# Patient Record
Sex: FEMALE | Race: WHITE | NOT HISPANIC OR LATINO
[De-identification: names, ages, dates, MRNs, and addresses within clinical notes are randomized per-mention and may not be internally consistent; named-entity substitution may affect disease eponyms.]

---

## 2017-03-06 ENCOUNTER — APPOINTMENT (OUTPATIENT)
Dept: NEPHROLOGY | Facility: CLINIC | Age: 77
End: 2017-03-06

## 2017-03-06 VITALS — SYSTOLIC BLOOD PRESSURE: 132 MMHG | HEART RATE: 72 BPM | DIASTOLIC BLOOD PRESSURE: 78 MMHG

## 2017-03-07 RX ORDER — PROBENECID 500 MG/1
500 TABLET ORAL
Qty: 30 | Refills: 2 | Status: DISCONTINUED | COMMUNITY
Start: 2017-03-06 | End: 2017-03-07

## 2017-07-03 ENCOUNTER — APPOINTMENT (OUTPATIENT)
Dept: NEPHROLOGY | Facility: CLINIC | Age: 77
End: 2017-07-03

## 2017-07-03 VITALS
WEIGHT: 184 LBS | SYSTOLIC BLOOD PRESSURE: 136 MMHG | DIASTOLIC BLOOD PRESSURE: 78 MMHG | BODY MASS INDEX: 29.7 KG/M2 | HEART RATE: 72 BPM

## 2017-07-03 VITALS — WEIGHT: 184 LBS | HEIGHT: 64 IN | BODY MASS INDEX: 31.41 KG/M2

## 2017-12-19 ENCOUNTER — EMERGENCY (EMERGENCY)
Facility: HOSPITAL | Age: 77
LOS: 1 days | Discharge: HOME | End: 2017-12-19
Attending: EMERGENCY MEDICINE | Admitting: EMERGENCY MEDICINE
Payer: MEDICARE

## 2017-12-19 VITALS
SYSTOLIC BLOOD PRESSURE: 201 MMHG | WEIGHT: 182.76 LBS | HEIGHT: 66 IN | TEMPERATURE: 98 F | RESPIRATION RATE: 16 BRPM | OXYGEN SATURATION: 98 % | HEART RATE: 71 BPM | DIASTOLIC BLOOD PRESSURE: 88 MMHG

## 2017-12-19 VITALS
HEART RATE: 66 BPM | DIASTOLIC BLOOD PRESSURE: 71 MMHG | RESPIRATION RATE: 16 BRPM | OXYGEN SATURATION: 95 % | SYSTOLIC BLOOD PRESSURE: 178 MMHG | TEMPERATURE: 99 F

## 2017-12-19 DIAGNOSIS — Z90.710 ACQUIRED ABSENCE OF BOTH CERVIX AND UTERUS: Chronic | ICD-10-CM

## 2017-12-19 LAB
ANION GAP SERPL CALC-SCNC: 17 MMOL/L — SIGNIFICANT CHANGE UP (ref 5–17)
APTT BLD: 30.6 SEC — SIGNIFICANT CHANGE UP (ref 27.5–37.4)
BASOPHILS NFR BLD AUTO: 0.4 % — SIGNIFICANT CHANGE UP (ref 0–2)
BUN SERPL-MCNC: 68 MG/DL — HIGH (ref 7–23)
CALCIUM SERPL-MCNC: 10.2 MG/DL — SIGNIFICANT CHANGE UP (ref 8.4–10.5)
CHLORIDE SERPL-SCNC: 103 MMOL/L — SIGNIFICANT CHANGE UP (ref 96–108)
CO2 SERPL-SCNC: 22 MMOL/L — SIGNIFICANT CHANGE UP (ref 22–31)
CREAT SERPL-MCNC: 2.81 MG/DL — HIGH (ref 0.5–1.3)
EOSINOPHIL NFR BLD AUTO: 1.3 % — SIGNIFICANT CHANGE UP (ref 0–6)
GLUCOSE SERPL-MCNC: 100 MG/DL — HIGH (ref 70–99)
HCT VFR BLD CALC: 36.6 % — SIGNIFICANT CHANGE UP (ref 34.5–45)
HGB BLD-MCNC: 12.1 G/DL — SIGNIFICANT CHANGE UP (ref 11.5–15.5)
INR BLD: 0.93 — SIGNIFICANT CHANGE UP (ref 0.88–1.16)
LYMPHOCYTES # BLD AUTO: 11.3 % — LOW (ref 13–44)
MCHC RBC-ENTMCNC: 30.3 PG — SIGNIFICANT CHANGE UP (ref 27–34)
MCHC RBC-ENTMCNC: 33.1 G/DL — SIGNIFICANT CHANGE UP (ref 32–36)
MCV RBC AUTO: 91.7 FL — SIGNIFICANT CHANGE UP (ref 80–100)
MONOCYTES NFR BLD AUTO: 9.4 % — SIGNIFICANT CHANGE UP (ref 2–14)
NEUTROPHILS NFR BLD AUTO: 77.6 % — HIGH (ref 43–77)
PLATELET # BLD AUTO: 255 K/UL — SIGNIFICANT CHANGE UP (ref 150–400)
POTASSIUM SERPL-MCNC: 4.4 MMOL/L — SIGNIFICANT CHANGE UP (ref 3.5–5.3)
POTASSIUM SERPL-SCNC: 4.4 MMOL/L — SIGNIFICANT CHANGE UP (ref 3.5–5.3)
PROTHROM AB SERPL-ACNC: 10.3 SEC — SIGNIFICANT CHANGE UP (ref 9.8–12.7)
RBC # BLD: 3.99 M/UL — SIGNIFICANT CHANGE UP (ref 3.8–5.2)
RBC # FLD: 14.8 % — SIGNIFICANT CHANGE UP (ref 10.3–16.9)
SODIUM SERPL-SCNC: 142 MMOL/L — SIGNIFICANT CHANGE UP (ref 135–145)
WBC # BLD: 7.7 K/UL — SIGNIFICANT CHANGE UP (ref 3.8–10.5)
WBC # FLD AUTO: 7.7 K/UL — SIGNIFICANT CHANGE UP (ref 3.8–10.5)

## 2017-12-19 PROCEDURE — 93005 ELECTROCARDIOGRAM TRACING: CPT

## 2017-12-19 PROCEDURE — 99284 EMERGENCY DEPT VISIT MOD MDM: CPT | Mod: 25

## 2017-12-19 PROCEDURE — 82962 GLUCOSE BLOOD TEST: CPT

## 2017-12-19 PROCEDURE — 85025 COMPLETE CBC W/AUTO DIFF WBC: CPT

## 2017-12-19 PROCEDURE — 36415 COLL VENOUS BLD VENIPUNCTURE: CPT

## 2017-12-19 PROCEDURE — 80048 BASIC METABOLIC PNL TOTAL CA: CPT

## 2017-12-19 PROCEDURE — 93010 ELECTROCARDIOGRAM REPORT: CPT

## 2017-12-19 PROCEDURE — 85610 PROTHROMBIN TIME: CPT

## 2017-12-19 PROCEDURE — 70450 CT HEAD/BRAIN W/O DYE: CPT | Mod: 26

## 2017-12-19 PROCEDURE — 85730 THROMBOPLASTIN TIME PARTIAL: CPT

## 2017-12-19 PROCEDURE — 70450 CT HEAD/BRAIN W/O DYE: CPT

## 2017-12-19 NOTE — ED ADULT NURSE NOTE - PMH
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

## 2017-12-19 NOTE — ED ADULT NURSE NOTE - OBJECTIVE STATEMENT
presents to ED c/o of right arm weakness since last night. pt reports not having the strength to move hand or pick things up right hand.  pt said she thought she was just tired.  Says she still feels like her right hand is weak today.  pt denies any headache, visual changes, nausea, numbness or tingling, difficulty speaking,

## 2017-12-19 NOTE — ED ADULT TRIAGE NOTE - CHIEF COMPLAINT QUOTE
"Last night around 10PM I noticed my right hand is like a drop hand. Even when I was brushing my teeth I couldn't hold it."

## 2017-12-19 NOTE — ED PROVIDER NOTE - PROGRESS NOTE DETAILS
CT neg.  Pt discussed w pmd who requests Dr Mata group for consult.  Neuro paged to discuss. Pt discussed w Dr Mata who requests we dc the pt to come see him now at his office; he states he can arrange mri prn but agrees it's likely a peripheral nerve palsy.

## 2017-12-19 NOTE — ED PROVIDER NOTE - MEDICAL DECISION MAKING DETAILS
Pt c/o R hand clumsiness w decreased motor and sens on exam.  BP elevated but improving w/o intervention by ed.  ? cva vs peripheral nerve palsy (pt may have compressed it last pm on her chair).  Plan labs, ct head, ekg.  Reassess

## 2017-12-19 NOTE — ED PROVIDER NOTE - OBJECTIVE STATEMENT
76 yo female h/o mesothelioma, htn, gout, hypothyroid c/o R hand clumsiness since 10 pm last night.  Pt did not sleep Sun into Mon; she went to the gym during the day yest and did some weight activities.  She might have fallen asleep on her chair before getting ready for bed and noting R hand clumsiness but is not sure.  Pt woke this am and noted same sx.  No ha, facial or le sx, h/o cva, neck/back pain, cp, palpitations, h/o similar.  No change in vision, speech, gait, dizziness.  Pt reports she took her bp meds ~ 30 min pta.  She states she felt nervous in triage, which affects her bp.

## 2017-12-19 NOTE — ED PROVIDER NOTE - NEUROLOGICAL, MLM
Awake, alert, oriented x 3, CN II-XII grossly intact, motor 4/5 r hand/wrist but 5/5 elbow/shoulder, 5/5 all other ext, decreased sensation 1st mcp web space area R hand o/w no gross sens deficits, gait steady, no ataxia, speech clear.

## 2017-12-22 DIAGNOSIS — G58.8 OTHER SPECIFIED MONONEUROPATHIES: ICD-10-CM

## 2017-12-22 DIAGNOSIS — Z88.0 ALLERGY STATUS TO PENICILLIN: ICD-10-CM

## 2017-12-22 DIAGNOSIS — R53.1 WEAKNESS: ICD-10-CM

## 2017-12-22 DIAGNOSIS — I10 ESSENTIAL (PRIMARY) HYPERTENSION: ICD-10-CM

## 2017-12-22 DIAGNOSIS — Z90.710 ACQUIRED ABSENCE OF BOTH CERVIX AND UTERUS: ICD-10-CM

## 2017-12-22 DIAGNOSIS — Z79.899 OTHER LONG TERM (CURRENT) DRUG THERAPY: ICD-10-CM

## 2017-12-22 DIAGNOSIS — Z88.8 ALLERGY STATUS TO OTHER DRUGS, MEDICAMENTS AND BIOLOGICAL SUBSTANCES: ICD-10-CM

## 2017-12-22 DIAGNOSIS — Z79.82 LONG TERM (CURRENT) USE OF ASPIRIN: ICD-10-CM

## 2017-12-22 DIAGNOSIS — E03.9 HYPOTHYROIDISM, UNSPECIFIED: ICD-10-CM

## 2017-12-22 DIAGNOSIS — Z91.018 ALLERGY TO OTHER FOODS: ICD-10-CM

## 2017-12-22 DIAGNOSIS — Z88.1 ALLERGY STATUS TO OTHER ANTIBIOTIC AGENTS STATUS: ICD-10-CM

## 2017-12-22 DIAGNOSIS — Z88.2 ALLERGY STATUS TO SULFONAMIDES: ICD-10-CM

## 2018-02-26 ENCOUNTER — APPOINTMENT (OUTPATIENT)
Dept: NEPHROLOGY | Facility: CLINIC | Age: 78
End: 2018-02-26
Payer: MEDICARE

## 2018-02-26 VITALS — WEIGHT: 170 LBS | BODY MASS INDEX: 29.18 KG/M2

## 2018-02-26 VITALS — DIASTOLIC BLOOD PRESSURE: 82 MMHG | SYSTOLIC BLOOD PRESSURE: 152 MMHG | HEART RATE: 78 BPM

## 2018-02-26 PROCEDURE — 99214 OFFICE O/P EST MOD 30 MIN: CPT

## 2018-02-26 RX ORDER — OLMESARTAN MEDOXOMIL 40 MG/1
40 TABLET, FILM COATED ORAL
Qty: 90 | Refills: 0 | Status: DISCONTINUED | COMMUNITY
Start: 2017-04-05 | End: 2018-02-26

## 2018-03-27 ENCOUNTER — APPOINTMENT (OUTPATIENT)
Dept: OBGYN | Facility: CLINIC | Age: 78
End: 2018-03-27
Payer: MEDICARE

## 2018-03-27 VITALS
BODY MASS INDEX: 29.37 KG/M2 | DIASTOLIC BLOOD PRESSURE: 80 MMHG | SYSTOLIC BLOOD PRESSURE: 140 MMHG | WEIGHT: 172 LBS | HEIGHT: 64 IN

## 2018-03-27 DIAGNOSIS — Z02.82 ENCOUNTER FOR ADOPTION SERVICES: ICD-10-CM

## 2018-03-27 PROCEDURE — G0101: CPT

## 2018-04-02 LAB — PAP TEST: NORMAL

## 2018-04-05 ENCOUNTER — APPOINTMENT (OUTPATIENT)
Dept: NEPHROLOGY | Facility: CLINIC | Age: 78
End: 2018-04-05
Payer: MEDICARE

## 2018-04-05 VITALS — HEART RATE: 72 BPM | DIASTOLIC BLOOD PRESSURE: 70 MMHG | SYSTOLIC BLOOD PRESSURE: 128 MMHG

## 2018-04-05 PROCEDURE — 99214 OFFICE O/P EST MOD 30 MIN: CPT

## 2018-07-29 ENCOUNTER — EMERGENCY (EMERGENCY)
Facility: HOSPITAL | Age: 78
LOS: 1 days | Discharge: HOME | End: 2018-07-29
Attending: EMERGENCY MEDICINE | Admitting: EMERGENCY MEDICINE
Payer: MEDICARE

## 2018-07-29 VITALS
TEMPERATURE: 99 F | OXYGEN SATURATION: 95 % | HEART RATE: 69 BPM | SYSTOLIC BLOOD PRESSURE: 160 MMHG | RESPIRATION RATE: 18 BRPM | DIASTOLIC BLOOD PRESSURE: 73 MMHG

## 2018-07-29 VITALS
RESPIRATION RATE: 20 BRPM | HEART RATE: 79 BPM | SYSTOLIC BLOOD PRESSURE: 156 MMHG | DIASTOLIC BLOOD PRESSURE: 87 MMHG | WEIGHT: 169.54 LBS | OXYGEN SATURATION: 98 % | TEMPERATURE: 98 F

## 2018-07-29 DIAGNOSIS — R11.2 NAUSEA WITH VOMITING, UNSPECIFIED: ICD-10-CM

## 2018-07-29 DIAGNOSIS — Z79.899 OTHER LONG TERM (CURRENT) DRUG THERAPY: ICD-10-CM

## 2018-07-29 DIAGNOSIS — Z88.8 ALLERGY STATUS TO OTHER DRUGS, MEDICAMENTS AND BIOLOGICAL SUBSTANCES: ICD-10-CM

## 2018-07-29 DIAGNOSIS — Z79.82 LONG TERM (CURRENT) USE OF ASPIRIN: ICD-10-CM

## 2018-07-29 DIAGNOSIS — Z91.018 ALLERGY TO OTHER FOODS: ICD-10-CM

## 2018-07-29 DIAGNOSIS — I10 ESSENTIAL (PRIMARY) HYPERTENSION: ICD-10-CM

## 2018-07-29 DIAGNOSIS — R10.9 UNSPECIFIED ABDOMINAL PAIN: ICD-10-CM

## 2018-07-29 DIAGNOSIS — Z88.1 ALLERGY STATUS TO OTHER ANTIBIOTIC AGENTS STATUS: ICD-10-CM

## 2018-07-29 DIAGNOSIS — Z88.0 ALLERGY STATUS TO PENICILLIN: ICD-10-CM

## 2018-07-29 DIAGNOSIS — Z90.710 ACQUIRED ABSENCE OF BOTH CERVIX AND UTERUS: Chronic | ICD-10-CM

## 2018-07-29 LAB
ALBUMIN SERPL ELPH-MCNC: 4.4 G/DL — SIGNIFICANT CHANGE UP (ref 3.3–5)
ALP SERPL-CCNC: 54 U/L — SIGNIFICANT CHANGE UP (ref 40–120)
ALT FLD-CCNC: 16 U/L — SIGNIFICANT CHANGE UP (ref 10–45)
ANION GAP SERPL CALC-SCNC: 17 MMOL/L — SIGNIFICANT CHANGE UP (ref 5–17)
APPEARANCE UR: CLEAR — SIGNIFICANT CHANGE UP
AST SERPL-CCNC: 23 U/L — SIGNIFICANT CHANGE UP (ref 10–40)
BASOPHILS NFR BLD AUTO: 0.1 % — SIGNIFICANT CHANGE UP (ref 0–2)
BILIRUB SERPL-MCNC: 0.5 MG/DL — SIGNIFICANT CHANGE UP (ref 0.2–1.2)
BILIRUB UR-MCNC: NEGATIVE — SIGNIFICANT CHANGE UP
BUN SERPL-MCNC: 42 MG/DL — HIGH (ref 7–23)
CALCIUM SERPL-MCNC: 10.7 MG/DL — HIGH (ref 8.4–10.5)
CHLORIDE SERPL-SCNC: 97 MMOL/L — SIGNIFICANT CHANGE UP (ref 96–108)
CO2 SERPL-SCNC: 26 MMOL/L — SIGNIFICANT CHANGE UP (ref 22–31)
COLOR SPEC: YELLOW — SIGNIFICANT CHANGE UP
CREAT SERPL-MCNC: 1.84 MG/DL — HIGH (ref 0.5–1.3)
DIFF PNL FLD: NEGATIVE — SIGNIFICANT CHANGE UP
EOSINOPHIL NFR BLD AUTO: 0.1 % — SIGNIFICANT CHANGE UP (ref 0–6)
GLUCOSE SERPL-MCNC: 136 MG/DL — HIGH (ref 70–99)
GLUCOSE UR QL: NEGATIVE — SIGNIFICANT CHANGE UP
HCT VFR BLD CALC: 40.9 % — SIGNIFICANT CHANGE UP (ref 34.5–45)
HGB BLD-MCNC: 12.9 G/DL — SIGNIFICANT CHANGE UP (ref 11.5–15.5)
KETONES UR-MCNC: NEGATIVE — SIGNIFICANT CHANGE UP
LEUKOCYTE ESTERASE UR-ACNC: ABNORMAL
LIDOCAIN IGE QN: 38 U/L — SIGNIFICANT CHANGE UP (ref 7–60)
LYMPHOCYTES # BLD AUTO: 10 % — LOW (ref 13–44)
MCHC RBC-ENTMCNC: 29.5 PG — SIGNIFICANT CHANGE UP (ref 27–34)
MCHC RBC-ENTMCNC: 31.5 G/DL — LOW (ref 32–36)
MCV RBC AUTO: 93.4 FL — SIGNIFICANT CHANGE UP (ref 80–100)
MONOCYTES NFR BLD AUTO: 12.8 % — SIGNIFICANT CHANGE UP (ref 2–14)
NEUTROPHILS NFR BLD AUTO: 77 % — SIGNIFICANT CHANGE UP (ref 43–77)
NITRITE UR-MCNC: NEGATIVE — SIGNIFICANT CHANGE UP
PH UR: 6 — SIGNIFICANT CHANGE UP (ref 5–8)
PLATELET # BLD AUTO: 305 K/UL — SIGNIFICANT CHANGE UP (ref 150–400)
POTASSIUM SERPL-MCNC: 4 MMOL/L — SIGNIFICANT CHANGE UP (ref 3.5–5.3)
POTASSIUM SERPL-SCNC: 4 MMOL/L — SIGNIFICANT CHANGE UP (ref 3.5–5.3)
PROT SERPL-MCNC: 7.4 G/DL — SIGNIFICANT CHANGE UP (ref 6–8.3)
PROT UR-MCNC: 100 MG/DL
RBC # BLD: 4.38 M/UL — SIGNIFICANT CHANGE UP (ref 3.8–5.2)
RBC # FLD: 15.6 % — SIGNIFICANT CHANGE UP (ref 10.3–16.9)
SODIUM SERPL-SCNC: 140 MMOL/L — SIGNIFICANT CHANGE UP (ref 135–145)
SP GR SPEC: 1.02 — SIGNIFICANT CHANGE UP (ref 1–1.03)
UROBILINOGEN FLD QL: 0.2 E.U./DL — SIGNIFICANT CHANGE UP
WBC # BLD: 10.5 K/UL — SIGNIFICANT CHANGE UP (ref 3.8–10.5)
WBC # FLD AUTO: 10.5 K/UL — SIGNIFICANT CHANGE UP (ref 3.8–10.5)

## 2018-07-29 PROCEDURE — 99284 EMERGENCY DEPT VISIT MOD MDM: CPT | Mod: 25

## 2018-07-29 PROCEDURE — 36415 COLL VENOUS BLD VENIPUNCTURE: CPT

## 2018-07-29 PROCEDURE — 83690 ASSAY OF LIPASE: CPT

## 2018-07-29 PROCEDURE — 96374 THER/PROPH/DIAG INJ IV PUSH: CPT

## 2018-07-29 PROCEDURE — 80053 COMPREHEN METABOLIC PANEL: CPT

## 2018-07-29 PROCEDURE — 81001 URINALYSIS AUTO W/SCOPE: CPT

## 2018-07-29 PROCEDURE — 99284 EMERGENCY DEPT VISIT MOD MDM: CPT

## 2018-07-29 PROCEDURE — 96361 HYDRATE IV INFUSION ADD-ON: CPT

## 2018-07-29 PROCEDURE — 85025 COMPLETE CBC W/AUTO DIFF WBC: CPT

## 2018-07-29 RX ORDER — SODIUM CHLORIDE 9 MG/ML
3 INJECTION INTRAMUSCULAR; INTRAVENOUS; SUBCUTANEOUS ONCE
Qty: 0 | Refills: 0 | Status: COMPLETED | OUTPATIENT
Start: 2018-07-29 | End: 2018-07-29

## 2018-07-29 RX ORDER — SODIUM CHLORIDE 9 MG/ML
1000 INJECTION INTRAMUSCULAR; INTRAVENOUS; SUBCUTANEOUS ONCE
Qty: 0 | Refills: 0 | Status: COMPLETED | OUTPATIENT
Start: 2018-07-29 | End: 2018-07-29

## 2018-07-29 RX ORDER — SODIUM CHLORIDE 9 MG/ML
1000 INJECTION INTRAMUSCULAR; INTRAVENOUS; SUBCUTANEOUS
Qty: 0 | Refills: 0 | Status: DISCONTINUED | OUTPATIENT
Start: 2018-07-29 | End: 2018-08-02

## 2018-07-29 RX ORDER — FAMOTIDINE 10 MG/ML
20 INJECTION INTRAVENOUS ONCE
Qty: 0 | Refills: 0 | Status: COMPLETED | OUTPATIENT
Start: 2018-07-29 | End: 2018-07-29

## 2018-07-29 RX ADMIN — SODIUM CHLORIDE 1000 MILLILITER(S): 9 INJECTION INTRAMUSCULAR; INTRAVENOUS; SUBCUTANEOUS at 10:52

## 2018-07-29 RX ADMIN — SODIUM CHLORIDE 3 MILLILITER(S): 9 INJECTION INTRAMUSCULAR; INTRAVENOUS; SUBCUTANEOUS at 10:56

## 2018-07-29 RX ADMIN — FAMOTIDINE 20 MILLIGRAM(S): 10 INJECTION INTRAVENOUS at 10:52

## 2018-07-29 RX ADMIN — SODIUM CHLORIDE 1000 MILLILITER(S): 9 INJECTION INTRAMUSCULAR; INTRAVENOUS; SUBCUTANEOUS at 11:52

## 2018-07-29 RX ADMIN — Medication 30 MILLILITER(S): at 12:58

## 2018-07-29 NOTE — ED ADULT NURSE NOTE - OBJECTIVE STATEMENT
Patient is a 76yo female reporting multiple episodes of non-bloody emesis since last night. Patient reports she has "flare ups" from scar tissue in colon due to hx of Mesothelioma. In the past, patient reports relief from IV fluid hydration. Denies abdominal pain, diarrhea, chest pain, shortness of breath.

## 2018-07-29 NOTE — ED PROVIDER NOTE - OBJECTIVE STATEMENT
The pt is a 76 y/o F, who presents to ED stating "I just need iv fluids" - claims "resolving blockage". PMH mesothelioma, htn, gout. Ate something that didn't sit well with her 2 d ago, then developed n/v and some abd cramping, the abd cramping has since resolved, nausea fully resolved, but states "I got very dehydrated", claims hx of SBOs that self resolve, is now able to tolerate ice chips, passing gas, last bm this am. Pt refusing kub and ct, stating "I know what I need, which is ivf". Denies fevers, chills, cp, sob, diarrhea, constipation, fevers, chills, dizziness

## 2018-07-29 NOTE — ED PROVIDER NOTE - MEDICAL DECISION MAKING DETAILS
pt w/n/v and abd cramping - now resolved, pt states that only needs ivf - states that gets these episodes frequently when she eats the "wrong" thing - states sbo self resolve, refusing all imaging, labs at pt's baseline, given ivf and pt tolerating po fluids, hemodynamically stable, pt requesting dc

## 2018-07-29 NOTE — ED ADULT NURSE REASSESSMENT NOTE - NS ED NURSE REASSESS COMMENT FT1
Received rpt for continuing care in bed 19.  Patient A+OX3, IVF infusing as ordered.  No distress noted.  Breathing easily and unlabored.

## 2018-07-29 NOTE — ED PROVIDER NOTE - ATTENDING CONTRIBUTION TO CARE
I have seen the pt and reviewed all pertinent clinical data. I agree with the documentation/care/plan executed by SAVITA Cuenca.

## 2018-07-29 NOTE — ED ADULT TRIAGE NOTE - CHIEF COMPLAINT QUOTE
pt c/o vomiting since yesterday, pt denies any blood in the emesis. reports this happened to her before, she " gets hydrated with IV fluids".

## 2018-09-10 ENCOUNTER — APPOINTMENT (OUTPATIENT)
Dept: NEPHROLOGY | Facility: CLINIC | Age: 78
End: 2018-09-10
Payer: MEDICARE

## 2018-09-10 VITALS — DIASTOLIC BLOOD PRESSURE: 72 MMHG | SYSTOLIC BLOOD PRESSURE: 136 MMHG | HEART RATE: 74 BPM

## 2018-09-10 PROCEDURE — 99214 OFFICE O/P EST MOD 30 MIN: CPT

## 2019-01-28 ENCOUNTER — APPOINTMENT (OUTPATIENT)
Dept: NEPHROLOGY | Facility: CLINIC | Age: 79
End: 2019-01-28
Payer: MEDICARE

## 2019-01-28 VITALS — DIASTOLIC BLOOD PRESSURE: 70 MMHG | SYSTOLIC BLOOD PRESSURE: 138 MMHG | HEART RATE: 72 BPM

## 2019-01-28 PROCEDURE — 99214 OFFICE O/P EST MOD 30 MIN: CPT

## 2019-01-28 RX ORDER — ASPIRIN ENTERIC COATED TABLETS 81 MG 81 MG/1
81 TABLET, DELAYED RELEASE ORAL
Refills: 0 | Status: DISCONTINUED | COMMUNITY
End: 2019-01-28

## 2019-01-31 NOTE — PHYSICAL EXAM
[General Appearance - Alert] : alert [General Appearance - In No Acute Distress] : in no acute distress [Sclera] : the sclera and conjunctiva were normal [Extraocular Movements] : extraocular movements were intact [Outer Ear] : the ears and nose were normal in appearance [Examination Of The Oral Cavity] : the lips and gums were normal [Neck Appearance] : the appearance of the neck was normal [Neck Cervical Mass (___cm)] : no neck mass was observed [Jugular Venous Distention Increased] : there was no jugular-venous distention [Auscultation Breath Sounds / Voice Sounds] : lungs were clear to auscultation bilaterally [Heart Rate And Rhythm] : heart rate was normal and rhythm regular [Heart Sounds] : normal S1 and S2 [Heart Sounds Gallop] : no gallops [Murmurs] : no murmurs [Heart Sounds Pericardial Friction Rub] : no pericardial rub [Abdomen Soft] : soft [Abdomen Tenderness] : non-tender [Cervical Lymph Nodes Enlarged Anterior Bilaterally] : anterior cervical [Supraclavicular Lymph Nodes Enlarged Bilaterally] : supraclavicular [No CVA Tenderness] : no ~M costovertebral angle tenderness [No Spinal Tenderness] : no spinal tenderness [] : no rash [No Focal Deficits] : no focal deficits [Oriented To Time, Place, And Person] : oriented to person, place, and time [Impaired Insight] : insight and judgment were intact [Affect] : the affect was normal [FreeTextEntry1] : tr left ankle edema

## 2019-01-31 NOTE — HISTORY OF PRESENT ILLNESS
[FreeTextEntry1] : 77 yo woman  with CKD 4, HTN, proteinuria and gout here for f/u evaluation\par reports that she had gastroenteritis last week- 1/21- for several days- vomiting multiple times daily and frequent loose BM's\par labs done for this visit were done 1/23- acute rise in creat to 1.97, uric acid 11.4, calcium 10.7\par now feeling better than last week- but not all the way back to baseline-\par Prior that, needed prednisone 2 weeks ago for acute gout-- was on for about 1 week\par had taken a few aleve\par was off cherry extract and tumeric and celery seed - now back on it- \par Denies SOB or CP; No dysuria, flank pain, hematuria or dysuria.\par \par PMH: (Sept OV- since last  OV was seen in ER (7/29/2018) with abd pain, ? partial small bowel obstruction- vomited x 1 when happened--  then resolved)

## 2019-01-31 NOTE — REASON FOR VISIT
[Follow-Up] : a follow-up visit [FreeTextEntry1] : for CKD 4, HTN, proteinuria, microscopic hematuria and gout

## 2019-01-31 NOTE — ASSESSMENT
[FreeTextEntry1] : all lab data was reviewed with patient in detail from 1/23/2019\par  78-year-old woman with CKD 4, HTN, proteinuria, hematuria and gout. \par  -CKD 4-: B/C 48/1.91 BP and volume status acceptable \par -Gout: uric acid levels 11.7- this following her acute illness as above- to repeat\par  c/w  low purine diet- cherry juice and tumeric- is using  prednisone at times by her own accord\par rediscussed that she might benefit from allergy consult- but feels that she has already explored this venue\par -hypercalcemia-  new- to repeat data, obtain SPEP as well- avoid Ca products\par -HTN: controlled- no change in meds\par -proteinuria -low grade -205 mg/G creat--  to monitor off RAAS blockade\par -pyuria- repeat with urine culture \par -LE edema: much better-\par - secondary hyperparathyroidism: elevated  PTH in setting of hypercalcemia- reflects pattern most c/w primary or tertiary hyperpara- to repeat data\par --hematuria- resolved-\par \par f/u 4-6 months

## 2019-09-29 ENCOUNTER — EMERGENCY (EMERGENCY)
Facility: HOSPITAL | Age: 79
LOS: 1 days | Discharge: HOME | End: 2019-09-29
Attending: EMERGENCY MEDICINE | Admitting: EMERGENCY MEDICINE
Payer: MEDICARE

## 2019-09-29 VITALS
SYSTOLIC BLOOD PRESSURE: 193 MMHG | OXYGEN SATURATION: 99 % | TEMPERATURE: 98 F | HEART RATE: 68 BPM | RESPIRATION RATE: 18 BRPM | DIASTOLIC BLOOD PRESSURE: 87 MMHG | WEIGHT: 153.66 LBS

## 2019-09-29 DIAGNOSIS — M79.89 OTHER SPECIFIED SOFT TISSUE DISORDERS: ICD-10-CM

## 2019-09-29 DIAGNOSIS — M79.645 PAIN IN LEFT FINGER(S): ICD-10-CM

## 2019-09-29 DIAGNOSIS — Z90.710 ACQUIRED ABSENCE OF BOTH CERVIX AND UTERUS: Chronic | ICD-10-CM

## 2019-09-29 PROCEDURE — 99283 EMERGENCY DEPT VISIT LOW MDM: CPT

## 2019-09-29 RX ORDER — OLMESARTAN MEDOXOMIL 5 MG/1
1 TABLET, FILM COATED ORAL
Qty: 0 | Refills: 0 | DISCHARGE

## 2019-09-29 RX ORDER — OLMESARTAN MEDOXOMIL-HYDROCHLOROTHIAZIDE 25; 40 MG/1; MG/1
0 TABLET, FILM COATED ORAL
Qty: 0 | Refills: 0 | DISCHARGE

## 2019-09-29 RX ADMIN — Medication 100 MILLIGRAM(S): at 11:08

## 2019-09-29 NOTE — ED PROVIDER NOTE - PATIENT PORTAL LINK FT
You can access the FollowMyHealth Patient Portal offered by [facility] by registering at the following website: [email or web address]. By joining [employer]’s FollowMyHealth portal, you will also be able to view your health information using other applications (apps) compatible with our system.

## 2019-09-29 NOTE — ED PROVIDER NOTE - CLINICAL SUMMARY MEDICAL DECISION MAKING FREE TEXT BOX
here most likely w/ gout flare but given redness, will cover for cellulitis as well since pt to be placed on steroids. pt to return to the ED in 2 days for wound check vs see her doctor. will rx prednisone for her to take as this usually helps. doubt septic joint at this time. DC home in NAD with strict return precautions given.

## 2019-09-29 NOTE — ED PROVIDER NOTE - OBJECTIVE STATEMENT
77yo F hx of gout, htn, here with complaint of recurrent gout flare, worsening. States that she has swelling to right index finger DIP and PIP and spreading upwards to dorsal area, started a few days ago , took  prednisone 10mg during the week which usually helps, but pain is getting worse. Redness to DIP/PIP joints but denies redness elsewhere. Feels better when she elevates hand. Denies fever/chills/rigors, swelling to other joints. States that usually 10 or 20 mg of prednisone clears her gout but higher doses she cannot tolerate 2/2 psychiatric side effects. usually sees dr rubio for this. had left over prednisone that she took, but was afraid to self treat.

## 2019-09-29 NOTE — ED PROVIDER NOTE - PHYSICAL EXAMINATION
CONSTITUTIONAL: Well-appearing; well-nourished; in no apparent distress.   HEAD: Normocephalic; atraumatic.   EYES:  conjunctiva and sclera clear  ENT: normal nose; no rhinorrhea;  NECK: Supple; full ROM  RESPIRATORY: Breathing easily; no resp difficulty  EXT: No cyanosis, R 2nd finger w/ redness and swelling to DIP and PIP w/ tophi palpable. erythema does not extend past the joints, +edema to 2nd MCP joint. tender to touch. no induration.   SKIN: Normal for age and race; warm; dry; good turgor; no apparent lesions or rash.   NEURO: A & O x 3; face symmetric; grossly unremarkable.   PSYCHOLOGICAL: The patient’s mood and manner are appropriate.

## 2019-09-29 NOTE — ED ADULT NURSE NOTE - OBJECTIVE STATEMENT
pt has swelling to right index finger and spreading upwards to dorsal area, started a few days ago , took some prednisone during the week , getting worse , no fever/chills

## 2019-10-15 ENCOUNTER — APPOINTMENT (OUTPATIENT)
Dept: NEPHROLOGY | Facility: CLINIC | Age: 79
End: 2019-10-15
Payer: MEDICARE

## 2019-10-15 VITALS — SYSTOLIC BLOOD PRESSURE: 146 MMHG | DIASTOLIC BLOOD PRESSURE: 70 MMHG | HEART RATE: 70 BPM

## 2019-10-15 PROCEDURE — 99214 OFFICE O/P EST MOD 30 MIN: CPT

## 2019-10-15 NOTE — ASSESSMENT
[FreeTextEntry1] : all lab data was reviewed with patient in detail from 8/20/2019\par  78-year-old woman with CKD 4, HTN, proteinuria, hematuria and gout. \par  -CKD 4-: B/C 44/1.53- better- BP and volume status acceptable \par -Gout: uric acid levels  down to 8.6 from 11.7- \par  c/w  low purine diet- cherry juice and tumeric-\par consider IV Krystexxa (pegloticase)- will investigate\par  is using  prednisone at times by her own accord\par still she might benefit from allergy consult- re assess allopurinol- but she feels that she has already explored this \par -hypercalcemia-  Ca 10.6-  instructed to dc Vit D\par -HTN: BP above goal- but just off prednisone- instructed to monitor at home; adjust medications if remains > 140\par -proteinuria -low grade -205 mg/G creat--  for repeat UPC ratio today\par  to monitor off RAAS blockade\par -pyuria- intermittent\par -LE edema: resolved\par - secondary hyperparathyroidism: trial of dc Vit D; may benefit from sensipar if PTH  rises\par --hematuria- resolved-\par \par f/u 4-6 months

## 2019-10-15 NOTE — PHYSICAL EXAM
[General Appearance - Alert] : alert [General Appearance - In No Acute Distress] : in no acute distress [Sclera] : the sclera and conjunctiva were normal [Extraocular Movements] : extraocular movements were intact [Outer Ear] : the ears and nose were normal in appearance [Examination Of The Oral Cavity] : the lips and gums were normal [Neck Appearance] : the appearance of the neck was normal [Jugular Venous Distention Increased] : there was no jugular-venous distention [Neck Cervical Mass (___cm)] : no neck mass was observed [Heart Sounds Gallop] : no gallops [Heart Rate And Rhythm] : heart rate was normal and rhythm regular [Heart Sounds] : normal S1 and S2 [Heart Sounds Pericardial Friction Rub] : no pericardial rub [Murmurs] : no murmurs [Abdomen Soft] : soft [Abdomen Tenderness] : non-tender [Cervical Lymph Nodes Enlarged Anterior Bilaterally] : anterior cervical [Supraclavicular Lymph Nodes Enlarged Bilaterally] : supraclavicular [No CVA Tenderness] : no ~M costovertebral angle tenderness [No Spinal Tenderness] : no spinal tenderness [] : no rash [Oriented To Time, Place, And Person] : oriented to person, place, and time [No Focal Deficits] : no focal deficits [Affect] : the affect was normal [Impaired Insight] : insight and judgment were intact [Edema] : there was no peripheral edema [FreeTextEntry1] : soft creps right base

## 2019-10-15 NOTE — HISTORY OF PRESENT ILLNESS
[FreeTextEntry1] : 79 yo woman  here for f/u evaluation of CKD 4, HTN, proteinuria and gout.\par acute gout attack about 2 weeks ago- right index finger- still erythematous and tender\par took prednisone for about 1 week\par back on her supplements:  cherry extract and tumeric and celery seed -\par did recently have PNA as well- now resolved- for f/u CXR soon\par Does use aleve as needed\par Had EGD and colonoscopy recently- all okay\par Denies SOB or CP; No dysuria, flank pain, hematuria or dysuria.\par \par PMH: (Sept OV- since last  OV was seen in ER (7/29/2018) with abd pain, ? partial small bowel obstruction- vomited x 1 when happened--  then resolved)

## 2019-10-17 ENCOUNTER — RESULT REVIEW (OUTPATIENT)
Age: 79
End: 2019-10-17

## 2019-10-17 LAB
APPEARANCE: CLEAR
BACTERIA: NEGATIVE
BILIRUBIN URINE: NEGATIVE
BLOOD URINE: NEGATIVE
COLOR: NORMAL
CREAT SPEC-SCNC: 69 MG/DL
CREAT/PROT UR: 0.3 RATIO
GLUCOSE QUALITATIVE U: NEGATIVE
HYALINE CASTS: 0 /LPF
KETONES URINE: NEGATIVE
LEUKOCYTE ESTERASE URINE: NEGATIVE
MICROSCOPIC-UA: NORMAL
NITRITE URINE: NEGATIVE
PH URINE: 6.5
PROT UR-MCNC: 19 MG/DL
PROTEIN URINE: NORMAL
RED BLOOD CELLS URINE: 0 /HPF
SPECIFIC GRAVITY URINE: 1.02
SQUAMOUS EPITHELIAL CELLS: 3 /HPF
UROBILINOGEN URINE: NORMAL
WHITE BLOOD CELLS URINE: 5 /HPF

## 2020-02-12 ENCOUNTER — APPOINTMENT (OUTPATIENT)
Dept: NEPHROLOGY | Facility: CLINIC | Age: 80
End: 2020-02-12
Payer: MEDICARE

## 2020-02-12 VITALS — SYSTOLIC BLOOD PRESSURE: 130 MMHG | DIASTOLIC BLOOD PRESSURE: 70 MMHG | HEART RATE: 72 BPM

## 2020-02-12 PROCEDURE — 99214 OFFICE O/P EST MOD 30 MIN: CPT

## 2020-02-12 NOTE — HISTORY OF PRESENT ILLNESS
[FreeTextEntry1] : 78 yo woman  with CKD 3- 4, HTN, proteinuria and gout, here for f/u evaluation.\par for regular annual f/u testing next week- mammogram, f/u CT scans, dental (Had EGD and colonoscopy last fall, 2019)\par feels well.\par no gout since 9/2019- using cherry extract and tumeric and celery seed -\par rare use of aleve-  < 1 table a month\par Denies SOB or CP; No dysuria, flank pain, hematuria or dysuria.\par \par PMH: (Sept OV- since last  OV was seen in ER (7/29/2018) with abd pain, ? partial small bowel obstruction- vomited x 1 when happened--  then resolved)

## 2020-02-12 NOTE — PHYSICAL EXAM
[General Appearance - Alert] : alert [General Appearance - In No Acute Distress] : in no acute distress [Sclera] : the sclera and conjunctiva were normal [Extraocular Movements] : extraocular movements were intact [Outer Ear] : the ears and nose were normal in appearance [Examination Of The Oral Cavity] : the lips and gums were normal [Neck Appearance] : the appearance of the neck was normal [Neck Cervical Mass (___cm)] : no neck mass was observed [Jugular Venous Distention Increased] : there was no jugular-venous distention [Heart Rate And Rhythm] : heart rate was normal and rhythm regular [Murmurs] : no murmurs [Heart Sounds] : normal S1 and S2 [Heart Sounds Gallop] : no gallops [Edema] : there was no peripheral edema [Heart Sounds Pericardial Friction Rub] : no pericardial rub [Cervical Lymph Nodes Enlarged Anterior Bilaterally] : anterior cervical [No Spinal Tenderness] : no spinal tenderness [No CVA Tenderness] : no ~M costovertebral angle tenderness [Supraclavicular Lymph Nodes Enlarged Bilaterally] : supraclavicular [Oriented To Time, Place, And Person] : oriented to person, place, and time [No Focal Deficits] : no focal deficits [] : no rash [Impaired Insight] : insight and judgment were intact [Affect] : the affect was normal [FreeTextEntry1] : soft creps right base

## 2020-02-12 NOTE — ASSESSMENT
[FreeTextEntry1] : all lab data was reviewed with patient in detail from 2/6/2020\par  79-year-old woman with CKD 4, HTN, proteinuria, hematuria and gout. \par  -CKD 3- 4-: B/C 39/1.54- stabilized in acceptable range (lower than prior data)- BP and volume status acceptable \par -proteinuria 453- low grade-  c/w ramipril \par -Gout: uric acid level down to 7.7- from 8.6 and 11.7- prior determinations\par  c/w  low purine diet- cherry juice and tumeric-\par no need for prednisone x 1 year\par still she might benefit from allergy consult- re assess allopurinol- but she feels that she has already explored this \par -hypercalcemia-  Ca 10.8- off Vit D- measure SPEP and Vit D and PTH- ? tertiary hyperpara\par -HTN: BP above goal- but just off prednisone- instructed to monitor at home; adjust medications if remains > 140\par -proteinuria -low grade - 453 -low grade but up from 250- if any higher, reconsider RAAS blockade\par -pyuria- intermittent\par -LE edema: resolved\par --hematuria- resolved-\par \par f/u 4-6 months

## 2020-02-26 ENCOUNTER — APPOINTMENT (OUTPATIENT)
Dept: NEPHROLOGY | Facility: CLINIC | Age: 80
End: 2020-02-26

## 2020-07-24 ENCOUNTER — APPOINTMENT (OUTPATIENT)
Dept: OBGYN | Facility: CLINIC | Age: 80
End: 2020-07-24
Payer: MEDICARE

## 2020-07-24 VITALS
WEIGHT: 160 LBS | DIASTOLIC BLOOD PRESSURE: 100 MMHG | HEIGHT: 64 IN | SYSTOLIC BLOOD PRESSURE: 140 MMHG | BODY MASS INDEX: 27.31 KG/M2

## 2020-07-24 PROCEDURE — G0101: CPT

## 2020-07-24 NOTE — PHYSICAL EXAM
[Awake] : awake [Mass] : no breast mass [Acute Distress] : no acute distress [Alert] : alert [Nipple Discharge] : no nipple discharge [Soft] : soft [Axillary LAD] : no axillary lymphadenopathy [Tender] : non tender [Oriented x3] : oriented to person, place, and time [Normal] : cervix [No Bleeding] : there was no active vaginal bleeding [Uterine Adnexae] : were not tender and not enlarged

## 2020-07-24 NOTE — HISTORY OF PRESENT ILLNESS
[Currently In Menopause] : currently in menopause [Post-Menopause, No Sxs] : post-menopausal, currently without symptoms [Good] : being in good health [1 Year Ago] : 1 year ago [Healthy Diet] : a healthy diet [Weight Concerns] : no concerns with her weight [Regular Exercise] : regular exercise [Menstrual Problems] : reports normal menses [Up to Date] : up to date with ~his/her~ STD screening

## 2020-07-28 LAB — CYTOLOGY CVX/VAG DOC THIN PREP: ABNORMAL

## 2020-09-03 ENCOUNTER — APPOINTMENT (OUTPATIENT)
Dept: NEPHROLOGY | Facility: CLINIC | Age: 80
End: 2020-09-03
Payer: MEDICARE

## 2020-09-03 VITALS — DIASTOLIC BLOOD PRESSURE: 67 MMHG | SYSTOLIC BLOOD PRESSURE: 149 MMHG | HEART RATE: 62 BPM

## 2020-09-03 PROCEDURE — 99214 OFFICE O/P EST MOD 30 MIN: CPT

## 2020-09-03 NOTE — PHYSICAL EXAM
[General Appearance - Alert] : alert [General Appearance - In No Acute Distress] : in no acute distress [Sclera] : the sclera and conjunctiva were normal [Extraocular Movements] : extraocular movements were intact [Outer Ear] : the ears and nose were normal in appearance [Examination Of The Oral Cavity] : the lips and gums were normal [Neck Appearance] : the appearance of the neck was normal [Neck Cervical Mass (___cm)] : no neck mass was observed [Jugular Venous Distention Increased] : there was no jugular-venous distention [Heart Rate And Rhythm] : heart rate was normal and rhythm regular [Heart Sounds] : normal S1 and S2 [Heart Sounds Gallop] : no gallops [Murmurs] : no murmurs [Heart Sounds Pericardial Friction Rub] : no pericardial rub [Edema] : there was no peripheral edema [Cervical Lymph Nodes Enlarged Anterior Bilaterally] : anterior cervical [Supraclavicular Lymph Nodes Enlarged Bilaterally] : supraclavicular [No CVA Tenderness] : no ~M costovertebral angle tenderness [No Spinal Tenderness] : no spinal tenderness [] : no rash [No Focal Deficits] : no focal deficits [Oriented To Time, Place, And Person] : oriented to person, place, and time [Impaired Insight] : insight and judgment were intact [Affect] : the affect was normal

## 2020-09-06 NOTE — ASSESSMENT
[FreeTextEntry1] : all lab data was reviewed with patient in detail from 8/26/2020\par  79-year-old woman with CKD 3-4, HTN, proteinuria, hematuria and gout. \par  -CKD 3- 4-: B/C 35/1.50- stabilized, BP and volume status acceptable \par -proteinuria 453- low grade-  c/w ramipril \par -Gout: uric acid level trended up to 8.1, from prior 7.7-- reviewed purine restriction- seem to get attackes when UA > = 7.9-8\par  c/w cherry juice and tumeric-\par reviewed indications for prednisone\par still she might benefit from allergy consult- re assess allopurinol- but she feels that she has already explored this \par -hypercalcemia-  Ca 10.4- PTH 81\par prior SPEP was normal; consider DXA to assess bone density in setting of hyperpara; okay to monitor for now\par -HTN: BP above goal here today- she reports BP within range at other MDs and at home- monitor\par -proteinuria -low grade - 453 -low grade but up from 250- if any higher, reconsider RAAS blockade\par -pyuria- intermittent\par -LE edema: resolved\par --hematuria- resolved-\par \par f/u 4-6 months

## 2020-09-06 NOTE — HISTORY OF PRESENT ILLNESS
[FreeTextEntry1] : 80 yo woman  here for f/u evaluation of CKD 3- 4, HTN, proteinuria and gout.\par coping with pandemic-\par sheltering\par 1 episode of gout since last visit- took 15 mg prednisone for a few days.\par also reports 2 weeks ago had upset stomach and some N with vomiting- resolved after 6-7 days with conservative management (similar episode 7/2018 was hospitalized ?partial small bowel obstruction)\par today feels well.\par continues with cherry extract and tumeric and celery seed for her gout\par aleve rarely- 1-2 x since last OV\par Denies SOB or CP; No dysuria, flank pain, hematuria or dysuria.\par

## 2020-12-11 ENCOUNTER — APPOINTMENT (OUTPATIENT)
Dept: NEPHROLOGY | Facility: CLINIC | Age: 80
End: 2020-12-11
Payer: MEDICARE

## 2020-12-11 VITALS — SYSTOLIC BLOOD PRESSURE: 150 MMHG | DIASTOLIC BLOOD PRESSURE: 75 MMHG | HEART RATE: 77 BPM

## 2020-12-11 PROCEDURE — 99214 OFFICE O/P EST MOD 30 MIN: CPT

## 2020-12-11 NOTE — ASSESSMENT
[FreeTextEntry1] : all lab data was reviewed with patient in detail from 12/9/2020\par  80-year-old woman with CKD 3, HTN, proteinuria, hematuria and gout. \par -HTN: BP above goal here today- will work on lifestyle modification- \par if no better next OV either try new ARB  (says that benicar brand worked best now off formulary)  or change amlodipine to nifedipine\par  -CKD 3 - B/C 35/1.39- stable, BP and volume status acceptable \par -proteinuria 377- controlled-  c/w ramipril \par -Gout: uric acid level back down to 7.7- good--\par (prior visit: reviewed purine restriction- seem to get attacks when UA > = 7.9-8)\par  c/w cherry juice and tumeric-\par refer to allergy- re allopurinol testing- or- is it oaky to try Uloric-\par consider probenicid \par - hyperparathyroidism- PTH- 95 with Calcium 10.4- okay; off VIt d for now\par said she had a DXA from her PCP- will forward results to me\par -hyperlipidemia-  muscle pain with statin- will work on lifestyle modification- HDL protective to some degree at 79\par -pyuria- intermittent\par -LE edema: resolved\par --hematuria- resolved-\par \par f/u 3 months

## 2020-12-11 NOTE — HISTORY OF PRESENT ILLNESS
[FreeTextEntry1] : 81 yo woman with CKD 3- 4, HTN, proteinuria and gout, here for follow up evaluation\par developed gout attack right foot- relieved after 2 days of prednisone 10 mg- but then developed a foot infection- needed antibiotics-\par subsequent developed gout in left hand 4th digit, \par then right elbow-  and again in left hand 5th digit treated mostly with aleve\par had to stop cherry extract and tumeric and celery seed for her gout several months ago prior her colonoscopy- is going to restart now\par Denies SOB or CP; No dysuria, flank pain, hematuria or dysuria.\par

## 2021-01-10 ENCOUNTER — TRANSCRIPTION ENCOUNTER (OUTPATIENT)
Age: 81
End: 2021-01-10

## 2021-02-02 ENCOUNTER — APPOINTMENT (OUTPATIENT)
Dept: HEMATOLOGY ONCOLOGY | Facility: CLINIC | Age: 81
End: 2021-02-02
Payer: MEDICARE

## 2021-02-02 VITALS
BODY MASS INDEX: 28.68 KG/M2 | SYSTOLIC BLOOD PRESSURE: 164 MMHG | HEIGHT: 64 IN | WEIGHT: 168 LBS | HEART RATE: 84 BPM | OXYGEN SATURATION: 96 % | DIASTOLIC BLOOD PRESSURE: 80 MMHG | TEMPERATURE: 96.9 F

## 2021-02-02 DIAGNOSIS — H91.02: ICD-10-CM

## 2021-02-02 DIAGNOSIS — Z86.59 PERSONAL HISTORY OF OTHER MENTAL AND BEHAVIORAL DISORDERS: ICD-10-CM

## 2021-02-02 DIAGNOSIS — Z86.010 PERSONAL HISTORY OF COLONIC POLYPS: ICD-10-CM

## 2021-02-02 DIAGNOSIS — Z87.01 PERSONAL HISTORY OF PNEUMONIA (RECURRENT): ICD-10-CM

## 2021-02-02 PROCEDURE — 99204 OFFICE O/P NEW MOD 45 MIN: CPT | Mod: 25

## 2021-02-02 PROCEDURE — 36415 COLL VENOUS BLD VENIPUNCTURE: CPT

## 2021-02-02 RX ORDER — PREDNISONE 5 MG/1
5 TABLET ORAL
Qty: 50 | Refills: 3 | Status: DISCONTINUED | COMMUNITY
Start: 2020-09-03 | End: 2021-02-02

## 2021-02-03 LAB
ALBUMIN SERPL ELPH-MCNC: 4.6 G/DL
ALP BLD-CCNC: 81 U/L
ALT SERPL-CCNC: 14 U/L
ANION GAP SERPL CALC-SCNC: 15 MMOL/L
AST SERPL-CCNC: 23 U/L
BASOPHILS # BLD AUTO: 0.06 K/UL
BASOPHILS # BLD AUTO: 0.06 K/UL
BASOPHILS NFR BLD AUTO: 0.9 %
BASOPHILS NFR BLD AUTO: 0.9 %
BILIRUB SERPL-MCNC: 0.2 MG/DL
BUN SERPL-MCNC: 34 MG/DL
CALCIUM SERPL-MCNC: 10.9 MG/DL
CHLORIDE SERPL-SCNC: 107 MMOL/L
CO2 SERPL-SCNC: 24 MMOL/L
CREAT SERPL-MCNC: 1.83 MG/DL
CRP SERPL-MCNC: 0.29 MG/DL
EOSINOPHIL # BLD AUTO: 0.12 K/UL
EOSINOPHIL # BLD AUTO: 0.12 K/UL
EOSINOPHIL NFR BLD AUTO: 1.7 %
EOSINOPHIL NFR BLD AUTO: 1.7 %
ERYTHROCYTE [SEDIMENTATION RATE] IN BLOOD BY WESTERGREN METHOD: 30 MM/HR
GLUCOSE SERPL-MCNC: 117 MG/DL
HCT VFR BLD CALC: 39.3 %
HGB BLD-MCNC: 12.2 G/DL
HYPOCHROMIA BLD QL SMEAR: SLIGHT
LDH SERPL-CCNC: 190 U/L
LYMPHOCYTES # BLD AUTO: 1.26 K/UL
LYMPHOCYTES # BLD AUTO: 1.26 K/UL
LYMPHOCYTES NFR BLD AUTO: 18.3 %
LYMPHOCYTES NFR BLD AUTO: 18.3 %
MAN DIFF?: NORMAL
MCHC RBC-ENTMCNC: 29.8 PG
MCHC RBC-ENTMCNC: 31 GM/DL
MCV RBC AUTO: 95.9 FL
METAMYELOCYTES # FLD: 0.9 %
MONOCYTES # BLD AUTO: 0.72 K/UL
MONOCYTES # BLD AUTO: 0.72 K/UL
MONOCYTES NFR BLD AUTO: 10.4 %
MONOCYTES NFR BLD AUTO: 10.4 %
MSMEAR: NORMAL
NEUTROPHILS # BLD AUTO: 4.68 K/UL
NEUTROPHILS # BLD AUTO: 4.68 K/UL
NEUTROPHILS NFR BLD AUTO: 67.8 %
NEUTROPHILS NFR BLD AUTO: 67.8 %
OVALOCYTES BLD QL SMEAR: SLIGHT
PLAT MORPH BLD: NORMAL
PLATELET # BLD AUTO: 230 K/UL
POTASSIUM SERPL-SCNC: 4.9 MMOL/L
PROT SERPL-MCNC: 7.2 G/DL
RBC # BLD: 4.1 M/UL
RBC # FLD: 14.9 %
RBC BLD AUTO: ABNORMAL
SODIUM SERPL-SCNC: 146 MMOL/L
URATE SERPL-MCNC: 8.6 MG/DL
WBC # FLD AUTO: 6.91 K/UL

## 2021-02-05 LAB — TRYPTASE: 23.7 UG/L

## 2021-02-09 LAB — HISTAMINE BLD-MCNC: 0.35 NG/ML

## 2021-02-16 ENCOUNTER — TRANSCRIPTION ENCOUNTER (OUTPATIENT)
Age: 81
End: 2021-02-16

## 2021-02-25 ENCOUNTER — NON-APPOINTMENT (OUTPATIENT)
Age: 81
End: 2021-02-25

## 2021-02-25 ENCOUNTER — TRANSCRIPTION ENCOUNTER (OUTPATIENT)
Age: 81
End: 2021-02-25

## 2021-02-25 ENCOUNTER — APPOINTMENT (OUTPATIENT)
Dept: NEPHROLOGY | Facility: CLINIC | Age: 81
End: 2021-02-25
Payer: MEDICARE

## 2021-02-25 VITALS
WEIGHT: 168 LBS | SYSTOLIC BLOOD PRESSURE: 139 MMHG | DIASTOLIC BLOOD PRESSURE: 69 MMHG | BODY MASS INDEX: 28.84 KG/M2 | HEART RATE: 76 BPM

## 2021-02-25 PROCEDURE — 99214 OFFICE O/P EST MOD 30 MIN: CPT

## 2021-02-25 NOTE — HISTORY OF PRESENT ILLNESS
[FreeTextEntry1] : 81 yo woman here for f/u evaluation of CKD 3- 4, HTN, proteinuria and gout.\par Since last OV has seen allergy- to see if we can add allopurinol or probenicid- found to have elevated serum tryptase level-- and then heme to r/o systemic mastocytosis- probable need for bone marrow biopsy\par another gout attack in right elbow and right foot  begin of January; took prednisone  able to taper off prednisone by 1/18- no recurrence\par back on cherry extract and tumeric and celery seed for her gout\par Denies SOB or CP;\par Feels that she has a lot of energy- able to maintain her bike riding for transportation. \par No dysuria, flank pain, hematuria or dysuria.\par

## 2021-02-25 NOTE — PHYSICAL EXAM
[General Appearance - Alert] : alert [General Appearance - In No Acute Distress] : in no acute distress [] : no respiratory distress [Heart Rate And Rhythm] : heart rate was normal and rhythm regular [Heart Sounds] : normal S1 and S2 [Heart Sounds Gallop] : no gallops [Murmurs] : no murmurs [Heart Sounds Pericardial Friction Rub] : no pericardial rub [Edema] : there was no peripheral edema [Cervical Lymph Nodes Enlarged Anterior Bilaterally] : anterior cervical [Supraclavicular Lymph Nodes Enlarged Bilaterally] : supraclavicular [Oriented To Time, Place, And Person] : oriented to person, place, and time [Impaired Insight] : insight and judgment were intact [Affect] : the affect was normal

## 2021-02-26 ENCOUNTER — NON-APPOINTMENT (OUTPATIENT)
Age: 81
End: 2021-02-26

## 2021-02-26 NOTE — PHYSICAL EXAM
[Normal] : affect appropriate [de-identified] : RRR, S1, S2, murmur [de-identified] : trace LE edema [de-identified] : obese, scars present [de-identified] : arthritic deformities in hands

## 2021-02-26 NOTE — CONSULT LETTER
[Dear  ___] : Dear  [unfilled], [Consult Letter:] : I had the pleasure of evaluating your patient, [unfilled]. [( Thank you for referring [unfilled] for consultation for _____ )] : Thank you for referring [unfilled] for consultation for [unfilled] [Please see my note below.] : Please see my note below. [Consult Closing:] : Thank you very much for allowing me to participate in the care of this patient.  If you have any questions, please do not hesitate to contact me. [Sincerely,] : Sincerely, [FreeTextEntry3] : Oma Valencia

## 2021-02-26 NOTE — REVIEW OF SYSTEMS
[Patient Intake Form Reviewed] : Patient intake form was reviewed [Negative] : Allergic/Immunologic [FreeTextEntry7] : food related abdominal issues [FreeTextEntry9] : has arthritis, gout, sporadic joint attacks

## 2021-02-26 NOTE — ASSESSMENT
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 Although serum tryptase has been elevated above 20 upon two successive determinations, patient does not typically present with multiple symptoms of systemic mastocytosis other than allergic reactions to medications, but will evaluate for possibility. Have ordered CBC, CMP, CRP, flow cytometry, histamine, LDH, cKKIT mutation, sed rate, tryptase, and uric acid. Pending results, if necessary, will proceed with bone marrow aspirate and biopsy. Patient was advised to call office to discuss results and next appointment date.

## 2021-02-26 NOTE — HISTORY OF PRESENT ILLNESS
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 Complete blood count has been consistently completely normal and serum tryptase was elevated on two separate accounts. In December 2020, SPEP/IPEP revealed normal polyclonal immunofixation pattern, an elevated IgG, a normal histamine, a tryptase of 23, and uric acid of 9.3. C1 esterase inhibitor was normal. CT of the abdomen from February 2020 revealed no evidence of any abdominopelvic metastatic disease. unremarkable spleen, no lymphadenopathy, unremarkable liver except for stable 1.1 cm lesion. Also revealed bilateral renal cysts and stable area of peritoneal thickening along left abdominal wall. States that recent colonoscopy revealed polyps, but was otherwise normal. Due to her allergies to many medications to treat hyperuricemia, she takes Prednisone for many of her gouty attacks in addition to NSAIDs. Except for gout attacks, patient feels generally well at this time. Patient has occasional night sweats. Denies rashes, pruritus, frequent lightheaded, flushing, diarrhea,unintentional weight loss, fever, chills, recent infection.

## 2021-02-26 NOTE — END OF VISIT
[FreeTextEntry3] : All medical record entries made by the Scribe were at my, Dr. Oma Valencia, direction and personally dictated by me on 02/02/2021. I have reviewed the chart and agree that the record accurately reflects my personal performance of the history, physical exam, assessment and plan. I have also personally directed, reviewed, and agreed with the chart.

## 2021-03-08 ENCOUNTER — TRANSCRIPTION ENCOUNTER (OUTPATIENT)
Age: 81
End: 2021-03-08

## 2021-03-11 ENCOUNTER — APPOINTMENT (OUTPATIENT)
Dept: HEMATOLOGY ONCOLOGY | Facility: CLINIC | Age: 81
End: 2021-03-11
Payer: MEDICARE

## 2021-03-11 VITALS
HEART RATE: 67 BPM | SYSTOLIC BLOOD PRESSURE: 150 MMHG | BODY MASS INDEX: 28.85 KG/M2 | DIASTOLIC BLOOD PRESSURE: 70 MMHG | WEIGHT: 169 LBS | TEMPERATURE: 97.1 F | OXYGEN SATURATION: 98 % | HEIGHT: 64 IN

## 2021-03-11 DIAGNOSIS — R74.8 ABNORMAL LEVELS OF OTHER SERUM ENZYMES: ICD-10-CM

## 2021-03-11 LAB
APTT BLD: 30.1 SEC
ERYTHROCYTE [SEDIMENTATION RATE] IN BLOOD BY WESTERGREN METHOD: 18 MM/HR
INR PPP: 0.9
PT BLD: 10.9 SEC

## 2021-03-11 PROCEDURE — 99214 OFFICE O/P EST MOD 30 MIN: CPT

## 2021-03-11 NOTE — REVIEW OF SYSTEMS
[Negative] : Allergic/Immunologic [FreeTextEntry2] : very active, biked 6 miles today [FreeTextEntry4] : mild tinnitus

## 2021-03-11 NOTE — HISTORY OF PRESENT ILLNESS
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 In early February, the complete blood count revealed a normal white blood count with normal differential, normal hemoglobin, hematocrit and platelet count. Sedimentation rate was 30. Serum tryptase was the same at 23.7. Histamine level was normal. Uric acid was 8.6. The flow cytometry was normal on the peripheral blood and no cKIT mutation was identified. In late February, comprehensive metabolic panel was significant for a creatinine of 1.60, BUN 41, calcium 10.4 and glucose of 114. She states that she is physically active, and that she biked 6 miles today. Except for mild tinnitus, patient feels generally well at this time. Denies fever, chills, sweats, or recent infections.\par Denies fever, chills, sweats, recent infection. Of note, patient is scheduled for second dose of COVID-19 vaccine on 3/18. She is scheduled for mammogram/ultrasound on 3/16 and gynecological exam on 3/25.

## 2021-03-11 NOTE — REASON FOR VISIT
[Follow-Up Visit] : a follow-up visit for [FreeTextEntry2] : Elevated serum tryptase, Pre-procedural lab exam

## 2021-03-11 NOTE — END OF VISIT
[FreeTextEntry3] : All medical record entries made by the Scribe were at my, Dr. Oma Valencia, direction and personally dictated by me on 03/11/2021. I have reviewed the chart and agree that the record accurately reflects my personal performance of the history, physical exam, assessment and plan. I have also personally directed, reviewed, and agreed with the chart.

## 2021-03-11 NOTE — ASSESSMENT
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 Although she does not present with most of the features consistent with systemic mastocytosis, will proceed with marrow studies to further evaluate. Patient wishes to defer until she completes her vaccinations against Covid, her other doctor visits, and visit with grandchildren. Will schedule ambulatory with intravenous sedation at the hospital. Have ordered APTT, CBC, CMP, CRP, LDH, PT/INR, sedimentation rate, tryptase. Patient was advised to call office to discuss results and next appointment date.

## 2021-03-11 NOTE — PHYSICAL EXAM
[Normal] : affect appropriate [de-identified] : RRR, S1, S2, murmur [de-identified] : trace LE edema [de-identified] : obese, scars present [de-identified] : arthritic deformities in hands

## 2021-03-12 LAB
ACANTHOCYTES BLD QL SMEAR: SLIGHT
ALBUMIN SERPL ELPH-MCNC: 4.3 G/DL
ALP BLD-CCNC: 79 U/L
ALT SERPL-CCNC: 13 U/L
ANION GAP SERPL CALC-SCNC: 11 MMOL/L
AST SERPL-CCNC: 21 U/L
BASOPHILS # BLD AUTO: 0.06 K/UL
BASOPHILS # BLD AUTO: 0.06 K/UL
BASOPHILS NFR BLD AUTO: 0.9 %
BASOPHILS NFR BLD AUTO: 0.9 %
BILIRUB SERPL-MCNC: 0.2 MG/DL
BUN SERPL-MCNC: 43 MG/DL
CALCIUM SERPL-MCNC: 10.8 MG/DL
CHLORIDE SERPL-SCNC: 105 MMOL/L
CO2 SERPL-SCNC: 25 MMOL/L
CREAT SERPL-MCNC: 1.74 MG/DL
CRP SERPL-MCNC: <3 MG/L
EOSINOPHIL # BLD AUTO: 0.17 K/UL
EOSINOPHIL # BLD AUTO: 0.17 K/UL
EOSINOPHIL NFR BLD AUTO: 2.6 %
EOSINOPHIL NFR BLD AUTO: 2.6 %
GIANT PLATELETS BLD QL SMEAR: PRESENT
GLUCOSE SERPL-MCNC: 95 MG/DL
HCT VFR BLD CALC: 37.7 %
HGB BLD-MCNC: 11.9 G/DL
HYPOCHROMIA BLD QL SMEAR: SLIGHT
LDH SERPL-CCNC: 164 U/L
LYMPHOCYTES # BLD AUTO: 1.21 K/UL
LYMPHOCYTES # BLD AUTO: 1.21 K/UL
LYMPHOCYTES NFR BLD AUTO: 18.4 %
LYMPHOCYTES NFR BLD AUTO: 18.4 %
MAN DIFF?: NORMAL
MCHC RBC-ENTMCNC: 30.4 PG
MCHC RBC-ENTMCNC: 31.6 GM/DL
MCV RBC AUTO: 96.4 FL
MONOCYTES # BLD AUTO: 0.63 K/UL
MONOCYTES # BLD AUTO: 0.63 K/UL
MONOCYTES NFR BLD AUTO: 9.6 %
MONOCYTES NFR BLD AUTO: 9.6 %
MSMEAR: NORMAL
NEUTROPHILS # BLD AUTO: 4.51 K/UL
NEUTROPHILS # BLD AUTO: 4.51 K/UL
NEUTROPHILS NFR BLD AUTO: 66.7 %
NEUTROPHILS NFR BLD AUTO: 66.7 %
NEUTS BAND NFR BLD MANUAL: 1.8 %
NRBC # BLD MANUAL: 1 /100
PLAT MORPH BLD: ABNORMAL
PLATELET # BLD AUTO: 234 K/UL
POIKILOCYTOSIS BLD QL SMEAR: SLIGHT
POLYCHROMASIA BLD QL SMEAR: SLIGHT
POTASSIUM SERPL-SCNC: 4.7 MMOL/L
PROT SERPL-MCNC: 6.8 G/DL
RBC # BLD: 3.91 M/UL
RBC # FLD: 14.6 %
RBC BLD AUTO: ABNORMAL
SCHISTOCYTES BLD QL SMEAR: SLIGHT
SODIUM SERPL-SCNC: 141 MMOL/L
WBC # FLD AUTO: 6.58 K/UL

## 2021-03-14 LAB — TRYPTASE: 28.8 UG/L

## 2021-03-18 ENCOUNTER — TRANSCRIPTION ENCOUNTER (OUTPATIENT)
Age: 81
End: 2021-03-18

## 2021-03-26 ENCOUNTER — RESULT REVIEW (OUTPATIENT)
Age: 81
End: 2021-03-26

## 2021-03-26 ENCOUNTER — OUTPATIENT (OUTPATIENT)
Dept: OUTPATIENT SERVICES | Facility: HOSPITAL | Age: 81
LOS: 1 days | End: 2021-03-26
Payer: MEDICARE

## 2021-03-26 ENCOUNTER — APPOINTMENT (OUTPATIENT)
Dept: INTERVENTIONAL RADIOLOGY/VASCULAR | Facility: HOSPITAL | Age: 81
End: 2021-03-26

## 2021-03-26 DIAGNOSIS — Z90.710 ACQUIRED ABSENCE OF BOTH CERVIX AND UTERUS: Chronic | ICD-10-CM

## 2021-03-26 PROCEDURE — 99152 MOD SED SAME PHYS/QHP 5/>YRS: CPT

## 2021-03-26 PROCEDURE — 88342 IMHCHEM/IMCYTCHM 1ST ANTB: CPT | Mod: 26,59

## 2021-03-26 PROCEDURE — 38222 DX BONE MARROW BX & ASPIR: CPT | Mod: LT

## 2021-03-26 PROCEDURE — 88341 IMHCHEM/IMCYTCHM EA ADD ANTB: CPT | Mod: 26

## 2021-03-26 PROCEDURE — 88189 FLOWCYTOMETRY/READ 16 & >: CPT

## 2021-03-26 PROCEDURE — 77002 NEEDLE LOCALIZATION BY XRAY: CPT | Mod: 26

## 2021-03-26 PROCEDURE — 88313 SPECIAL STAINS GROUP 2: CPT | Mod: 26

## 2021-03-26 PROCEDURE — 88305 TISSUE EXAM BY PATHOLOGIST: CPT | Mod: 26

## 2021-03-26 PROCEDURE — 85097 BONE MARROW INTERPRETATION: CPT

## 2021-03-29 PROCEDURE — 88237 TISSUE CULTURE BONE MARROW: CPT

## 2021-03-29 PROCEDURE — 88185 FLOWCYTOMETRY/TC ADD-ON: CPT

## 2021-03-29 PROCEDURE — 99152 MOD SED SAME PHYS/QHP 5/>YRS: CPT

## 2021-03-29 PROCEDURE — 85097 BONE MARROW INTERPRETATION: CPT

## 2021-03-29 PROCEDURE — 38222 DX BONE MARROW BX & ASPIR: CPT

## 2021-03-29 PROCEDURE — 88264 CHROMOSOME ANALYSIS 20-25: CPT

## 2021-03-29 PROCEDURE — 81450 HL NEO GSAP 5-50DNA/DNA&RNA: CPT

## 2021-03-29 PROCEDURE — 77002 NEEDLE LOCALIZATION BY XRAY: CPT

## 2021-03-29 PROCEDURE — 88341 IMHCHEM/IMCYTCHM EA ADD ANTB: CPT

## 2021-03-29 PROCEDURE — 88184 FLOWCYTOMETRY/ TC 1 MARKER: CPT

## 2021-03-29 PROCEDURE — 99153 MOD SED SAME PHYS/QHP EA: CPT

## 2021-03-29 PROCEDURE — 88280 CHROMOSOME KARYOTYPE STUDY: CPT

## 2021-03-29 PROCEDURE — 88305 TISSUE EXAM BY PATHOLOGIST: CPT

## 2021-03-29 PROCEDURE — 88313 SPECIAL STAINS GROUP 2: CPT

## 2021-03-29 PROCEDURE — 87205 SMEAR GRAM STAIN: CPT

## 2021-03-31 ENCOUNTER — APPOINTMENT (OUTPATIENT)
Dept: NEPHROLOGY | Facility: CLINIC | Age: 81
End: 2021-03-31
Payer: MEDICARE

## 2021-03-31 VITALS — SYSTOLIC BLOOD PRESSURE: 145 MMHG | HEART RATE: 68 BPM | DIASTOLIC BLOOD PRESSURE: 71 MMHG

## 2021-03-31 DIAGNOSIS — M79.89 OTHER SPECIFIED SOFT TISSUE DISORDERS: ICD-10-CM

## 2021-03-31 PROCEDURE — 99214 OFFICE O/P EST MOD 30 MIN: CPT

## 2021-04-01 ENCOUNTER — TRANSCRIPTION ENCOUNTER (OUTPATIENT)
Age: 81
End: 2021-04-01

## 2021-04-01 NOTE — HISTORY OF PRESENT ILLNESS
[FreeTextEntry1] : 79 yo woman here for f/u evaluation of CKD 3- 4, HTN, proteinuria and gout.\par Here for urgent visit, yesterday reporting left foot swelling, pain today.  Saw podiatrist yesterday, Xray negative for fracture.  Then saw PCP and told her that she does not have a kidney problem and prescribed ibuprofen and torsemide.  Using two canes today.\par Got second covid vaccination last week, reports "brain fog" and fell and bruised her knee. otherwise well.  \par Doing/did bone marrow biopsy.\par \par last office visit 2/25/21:\par saw allergist to see if we can add allopurinol or probenicid- found to have elevated serum tryptase level-- and then heme to r/o systemic mastocytosis- probable need for bone marrow biopsy\par another gout attack in right elbow and right foot  begin of January; took prednisone able to taper off prednisone by 1/18- no recurrence\par back on cherry extract and tumeric and celery seed for her gout\par Denies SOB or CP;\par Feels that she has a lot of energy- able to maintain her bike riding for transportation. \par No dysuria, flank pain, hematuria or dysuria.\par

## 2021-04-01 NOTE — ASSESSMENT
[FreeTextEntry1] : all lab data was reviewed with patient in detail from 3/11/21\par 79 yo woman with CKD 3, HTN, proteinuria, hematuria and gout.\par -acute left foot swelling - physical exam not consistent with infection or gout.  Recommended elevation and ice.  Tylenol for pain. Avoid ibuprofen, hold additional torsemide tabs.  \par -HTN: BP within goal c/w lifestyle modification- weight loss\par (NB: says that benicar brand worked best now off formulary) \par  -CKD 4 - creat up a bit to 1.7- probable regular variability, range 1.4-1.8.  electrolytes, BP and volume status acceptable \par -hyperparathyroidism with high normal calcium level- 107, 10.4;  more c/w primary hyperparathyroidism- continue to defer use of Vit D; reminded her to forward DXA scan\par -proteinuria - Uprot/creat 326- stable-  c/w ramipril \par -Gout: testing to see if can tolerate uric acid lowering agents- will f/u with allergy\par also will measure lead level- \par (previously reviewed purine restriction- seem to get attacks when UA > = 7.9-8)\par  c/w cherry juice and tumeric-\par -hyperlipidemia-  stable -some elevation but muscle pain with statin-  will refocus on lifestyle modification-\par HDL very food at 79\par -LE edema: resolved\par -hematuria- resolved-\par \par repeat chem and lead not done yet\par will call pt to f/u tomorrow in regards to foot\par f/u 2 months\par \par addendum 4/1: pt reports foot feeling much better today, without pain and able to walk.  She had taken some tylenol and iced it.  Using compression stocking.

## 2021-04-01 NOTE — PHYSICAL EXAM
[General Appearance - Alert] : alert [General Appearance - In No Acute Distress] : in no acute distress [] : no respiratory distress [Heart Rate And Rhythm] : heart rate was normal and rhythm regular [Heart Sounds] : normal S1 and S2 [Heart Sounds Gallop] : no gallops [Murmurs] : no murmurs [Heart Sounds Pericardial Friction Rub] : no pericardial rub [Edema] : there was no peripheral edema [Cervical Lymph Nodes Enlarged Anterior Bilaterally] : anterior cervical [Supraclavicular Lymph Nodes Enlarged Bilaterally] : supraclavicular [Oriented To Time, Place, And Person] : oriented to person, place, and time [Impaired Insight] : insight and judgment were intact [Affect] : the affect was normal [Sclera] : the sclera and conjunctiva were normal [Outer Ear] : the ears and nose were normal in appearance [Neck Appearance] : the appearance of the neck was normal [Neck Cervical Mass (___cm)] : no neck mass was observed [Respiration, Rhythm And Depth] : normal respiratory rhythm and effort [FreeTextEntry1] : moderate left foot swelling, no joint tenderness, inconsistent with her usual gout.  no erythema or warmth

## 2021-04-05 LAB
BLUEBERRY IGG RAST: SIGNIFICANT CHANGE UP
CARP IGE RAST: SIGNIFICANT CHANGE UP
CARROT IGG RAST: SIGNIFICANT CHANGE UP
CAULIFLOWER IGG RAST: SIGNIFICANT CHANGE UP
ONKOSIGHT MYELOID SEQUENCE: NORMAL — SIGNIFICANT CHANGE UP
TM INTERPRETATION: SIGNIFICANT CHANGE UP

## 2021-04-07 ENCOUNTER — APPOINTMENT (OUTPATIENT)
Dept: OBGYN | Facility: CLINIC | Age: 81
End: 2021-04-07
Payer: MEDICARE

## 2021-04-07 VITALS
DIASTOLIC BLOOD PRESSURE: 82 MMHG | SYSTOLIC BLOOD PRESSURE: 160 MMHG | BODY MASS INDEX: 28 KG/M2 | HEIGHT: 64 IN | WEIGHT: 164 LBS

## 2021-04-07 LAB — CHROM ANALY OVERALL INTERP SPEC-IMP: SIGNIFICANT CHANGE UP

## 2021-04-07 PROCEDURE — G0101: CPT

## 2021-04-07 NOTE — HISTORY OF PRESENT ILLNESS
[Patient reported PAP Smear was normal] : Patient reported PAP Smear was normal [Post-Menopause, No Sxs] : post-menopausal, currently without symptoms [TextBox_4] : Patient is here for annual [Mammogramdate] : 03/2021 [PapSmeardate] : 07/2020 [BoneDensityDate] : 03/2021 [TextBox_37] : osteoporosis [ColonoscopyDate] : 2019 [TextBox_43] : polyps [Currently In Menopause] : currently in menopause [Previously active] : previously active

## 2021-04-10 LAB — MISCELLANEOUS TEST NAME: SIGNIFICANT CHANGE UP

## 2021-04-15 LAB — CYTOLOGY CVX/VAG DOC THIN PREP: NORMAL

## 2021-05-03 ENCOUNTER — NON-APPOINTMENT (OUTPATIENT)
Age: 81
End: 2021-05-03

## 2021-05-07 NOTE — ED ADULT NURSE NOTE - NS ED NURSE LEVEL OF CONSCIOUSNESS AFFECT
demonstrate the ability to perform 12\" two footed take off and landing x3 with visual and verbal cues <50% of the time Pt completed 12\" two footed take off and landing x3 with visual/verbal and mod tactile cues at trunk for improved forward weight shift and propulsion from floor 75% of task 3/5 trials. Pt did require max physical prompts for the initial 2 trials and then was able to complete with mod tactile cues for the next 3 trials. []Met  [x]Partially met  []Not met   Long Term Goal 3   Patient will demonstrate the ability to navigate balance discs and/or step reciprocally over three 4 inch hurdles with prompting <30% of the time 3/4 trials in order to improve balance and coordination Pt completed balance disc navigation x4 with min tactile cues for balance and foot placement for 50% of task on 2/4 trials. Pt completed stepping over 3 balance beams with stars placed between for a visual cue for foot placement . Pt was able to use an alternating pattern with verbal prompt \"red/yellow/blue\" to cue foot placement on specific star for 50% of task and pt completing 3/4 trials. []Met  [x]Partially met  []Not met   Long Term Goal 4   Patient will demonstrate the ability to accurately imitate 3/4 body positions with physical assistance <60% of the time to improve coordination and body awareness Not addressed this date. []Met  [x]Partially met  []Not met   Objective:  Pt tolerated session well with re-directions to stay on task. Co-treatment with MCCALLUM this date. EDUCATION  Spoke to mom regarding exercises performed this date.   Method of Education:     [x]Discussion     []Demonstration    []Written     []Other  Evaluation of Patients Response to Education:        [x]Patient and or caregiver verbalized understanding  []Patient and or Caregiver Demonstrated without assistance   []Patient and or Caregiver Demonstrated with assistance  []Needs additional instruction to demonstrate understanding of Calm

## 2021-05-20 ENCOUNTER — APPOINTMENT (OUTPATIENT)
Dept: HEMATOLOGY ONCOLOGY | Facility: CLINIC | Age: 81
End: 2021-05-20

## 2021-05-27 ENCOUNTER — APPOINTMENT (OUTPATIENT)
Dept: NEPHROLOGY | Facility: CLINIC | Age: 81
End: 2021-05-27
Payer: MEDICARE

## 2021-05-27 VITALS — SYSTOLIC BLOOD PRESSURE: 138 MMHG | HEART RATE: 70 BPM | DIASTOLIC BLOOD PRESSURE: 70 MMHG

## 2021-05-27 PROCEDURE — 99214 OFFICE O/P EST MOD 30 MIN: CPT

## 2021-05-27 NOTE — PHYSICAL EXAM
[General Appearance - Alert] : alert [General Appearance - In No Acute Distress] : in no acute distress [] : no respiratory distress [Respiration, Rhythm And Depth] : normal respiratory rhythm and effort [Heart Rate And Rhythm] : heart rate was normal and rhythm regular [Heart Sounds] : normal S1 and S2 [Heart Sounds Gallop] : no gallops [Murmurs] : no murmurs [Heart Sounds Pericardial Friction Rub] : no pericardial rub [Edema] : there was no peripheral edema [Oriented To Time, Place, And Person] : oriented to person, place, and time [Impaired Insight] : insight and judgment were intact [Affect] : the affect was normal

## 2021-05-27 NOTE — ASSESSMENT
[FreeTextEntry1] : all lab data was reviewed with patient in detail from 5/20/2021\par 81 yo woman with CKD 3-4, HTN, proteinuria, hematuria and gout.\par -HTN: BP controlled- c/w present regimen\par (NB: says that benicar brand worked best now off formulary) \par  -CKD 3- 4 - creat 1.5- good 1.4-1.8.  electrolytes, BP and volume status acceptable \par -hyperparathyroidism with high normal calcium level- PTH and Ca remain at stable levels 105 and 10.4 respectively- Does have osteoporosis.- will forward her DXA scan- consider PTX- \par will need follow up with [facility].\par keep off Vit D for now\par -proteinuria - low grade-  c/w ramipril \par -Gout:UA 8.7- at risk for recurrent gout attack (in past if > 8)\par mindful of foods she eats and avoiding those with high purine content- \par will d/w Dr. Damon, now that systemic mastocytosis excluded- complete evaluation for possible allergy to allopurinol or probenecid.\par  c/w cherry juice and tumeric-\par -hyperlipidemia-  stable chol and LDL up- but ate prior- and HDL elevated- no changes\par -LE edema: resolved\par -hematuria- resolved-\par \par \par f/u 4-5 months

## 2021-05-27 NOTE — HISTORY OF PRESENT ILLNESS
[FreeTextEntry1] : 79 yo woman with CKD 3- 4, HTN, proteinuria and gout, here for follow up evaluation\par recurrent gout in March. \par feeling quite well!\par remains very active.\par No new gout attacks\par had bone marrow biopsy to look for systemic mastocytosis given elevated serum tryptase levels (allergy was looking for allopurinol or probenecid allergy) \par back on cherry extract and tumeric and celery seed for her gout\par Denies SOB or CP;\par No dysuria, flank pain, hematuria or dysuria.\par

## 2021-07-16 ENCOUNTER — NON-APPOINTMENT (OUTPATIENT)
Age: 81
End: 2021-07-16

## 2021-07-20 ENCOUNTER — TRANSCRIPTION ENCOUNTER (OUTPATIENT)
Age: 81
End: 2021-07-20

## 2021-07-29 ENCOUNTER — NON-APPOINTMENT (OUTPATIENT)
Age: 81
End: 2021-07-29

## 2021-07-29 ENCOUNTER — APPOINTMENT (OUTPATIENT)
Dept: INTERNAL MEDICINE | Facility: CLINIC | Age: 81
End: 2021-07-29
Payer: MEDICARE

## 2021-07-29 ENCOUNTER — LABORATORY RESULT (OUTPATIENT)
Age: 81
End: 2021-07-29

## 2021-07-29 VITALS
OXYGEN SATURATION: 95 % | HEART RATE: 73 BPM | TEMPERATURE: 98.2 F | SYSTOLIC BLOOD PRESSURE: 155 MMHG | WEIGHT: 160 LBS | DIASTOLIC BLOOD PRESSURE: 82 MMHG | HEIGHT: 64 IN | BODY MASS INDEX: 27.31 KG/M2

## 2021-07-29 DIAGNOSIS — Z01.812 ENCOUNTER FOR PREPROCEDURAL LABORATORY EXAMINATION: ICD-10-CM

## 2021-07-29 DIAGNOSIS — Z67.40 TYPE O BLOOD, RH POSITIVE: ICD-10-CM

## 2021-07-29 PROCEDURE — G0439: CPT

## 2021-07-29 PROCEDURE — G0444 DEPRESSION SCREEN ANNUAL: CPT

## 2021-07-29 PROCEDURE — 36415 COLL VENOUS BLD VENIPUNCTURE: CPT

## 2021-07-29 NOTE — HEALTH RISK ASSESSMENT
[Good] : ~his/her~  mood as  good [Patient reported mammogram was normal] : Patient reported mammogram was normal [Patient reported PAP Smear was normal] : Patient reported PAP Smear was normal [Patient reported bone density results were normal] : Patient reported bone density results were normal [FreeTextEntry1] : [facility]  [0] : 2) Feeling down, depressed, or hopeless: Not at all (0) [[id number]] : 0 [MammogramDate] : 03/19/2021 [PapSmearDate] : 03/01/2021 [BoneDensityDate] : 03/21/2021 [ColonoscopyDate] : 01/01/2018 [ColonoscopyComments] : Dr. Vasquez

## 2021-07-29 NOTE — HISTORY OF PRESENT ILLNESS
[FreeTextEntry1] : Annual visit/ Establish care  [de-identified] : Mrs. be is a 80 Y/F \par - to est primary care\par - currently w/o specific complaints\par - reviewed extensive medical history and records with pt.\par \par CKD \par - follows w/ nephro\par - cr stable.\par  \par Gout \par - schedule to trial probenecid by allergist b/c of multiple other medical allergies\par - administration in MD's office on August 9th and 10th \par \par HCM \par - Up to date

## 2021-07-30 ENCOUNTER — NON-APPOINTMENT (OUTPATIENT)
Age: 81
End: 2021-07-30

## 2021-07-30 ENCOUNTER — APPOINTMENT (OUTPATIENT)
Dept: HEART AND VASCULAR | Facility: CLINIC | Age: 81
End: 2021-07-30
Payer: MEDICARE

## 2021-07-30 VITALS
HEIGHT: 64 IN | HEART RATE: 73 BPM | SYSTOLIC BLOOD PRESSURE: 146 MMHG | DIASTOLIC BLOOD PRESSURE: 66 MMHG | WEIGHT: 160 LBS | BODY MASS INDEX: 27.31 KG/M2 | OXYGEN SATURATION: 96 %

## 2021-07-30 VITALS — TEMPERATURE: 98 F

## 2021-07-30 PROCEDURE — 93000 ELECTROCARDIOGRAM COMPLETE: CPT

## 2021-07-30 PROCEDURE — 93306 TTE W/DOPPLER COMPLETE: CPT

## 2021-07-30 PROCEDURE — 99205 OFFICE O/P NEW HI 60 MIN: CPT

## 2021-08-01 ENCOUNTER — TRANSCRIPTION ENCOUNTER (OUTPATIENT)
Age: 81
End: 2021-08-01

## 2021-08-01 NOTE — HISTORY OF PRESENT ILLNESS
[FreeTextEntry1] : 80F presents to establish care for her history of cardiac murmur, hypertension, dilated aortic root, coronary artery calcification on CT chest.\par \par She is doing well. She bikes up to 10 miles a day without chest pain, dyspnea, syncope, pre syncope. No palpitations, has mild chronic periperal edema, has no orthonpea or PND.\par \par She was following her PCP regarding her cardiac murmur and has had prior ECHOS (but has been a while) but is unsure which valve had disease. \par With regards to hypertension, she follows Dr Love and there is a plan to consider ARB due to concern about ramipril causing systemic mastocytosis (though work up was reportedly negative). \par \par She reports intolerance to statins with myalgias - though doesn't remember which ones she has tried. She had severe bloating with ?cholestyramine. Not sure if she has tried zetia previously. \par \par \par \par PMHx:\par Mesothelioma in remission 23 years\par CKD 3/4 with proteinuria\par HTN\par Gout\par Hyperparathyroidism\par Hypothyroidism \par \par Social Hx: \par Diet Score 5\par Never smoked\par No alcohol\par  minutes/week of biking + walking 120-150 minutes. Has occasional anxiety, no symptoms of JOHN. menopause at 53. No pregnancy complications.\par \par Data:\par CT chest 2/20: Dilated aortic root 4.1cm. Incidental Coronary artery calcification.\par Lipids: , Tg 71, Tchol 223, N \par \par CV risk factors:\par Coronary calcium\par HLD\par CKD (GFR 30)\par Proteinuria\par HTN, uncontrolled\par \par

## 2021-08-01 NOTE — PHYSICAL EXAM
[III] : a grade 3 [___ +] : bilateral [unfilled]U+ pitting edema to the ankles [Normal] : alert and oriented, normal memory [de-identified] : II/VI EMERSON radiating to axilla

## 2021-08-01 NOTE — END OF VISIT
[FreeTextEntry3] : Patient seen and examined. Case discussed with preventive cardiology fellow. Agree with plan as detailed above.  [] : Fellow

## 2021-08-01 NOTE — REASON FOR VISIT
[CV Risk Factors and Non-Cardiac Disease] : CV risk factors and non-cardiac disease [Structural Heart and Valve Disease] : structural heart and valve disease [Hypertension] : hypertension [FreeTextEntry3] : Dr Saman Guzman

## 2021-08-01 NOTE — DISCUSSION/SUMMARY
[FreeTextEntry1] : 80F with HTN, HLD, coronary artery calcification and cardiac systolic murmur (suspect AS). Overall she is doing well, her BP is uncontrolled. Her CV risk factors also need optimization.\par \par #HTN: Uncontrolled 146/70. Agree with Dr Love's plan for ARB.\par \par #Cardiac murmur, likely aortic: Check ECHO. Asymptomatic. No features of severe valve disease\par \par #HLD: \par #Coronary calcium\par Mild, but with coronary ca on CT need better control. Prior statin intolerance so will trial zetia and recheck lipids in 8-12 weeks. \par \par We discussed at length about a heart healthy diet, we recommended a dietician given her CKD and history of food intolerances. She will continue her exercise regimen.

## 2021-08-02 ENCOUNTER — APPOINTMENT (OUTPATIENT)
Dept: CARDIOLOGY | Facility: CLINIC | Age: 81
End: 2021-08-02

## 2021-08-04 ENCOUNTER — TRANSCRIPTION ENCOUNTER (OUTPATIENT)
Age: 81
End: 2021-08-04

## 2021-08-05 ENCOUNTER — NON-APPOINTMENT (OUTPATIENT)
Age: 81
End: 2021-08-05

## 2021-08-05 ENCOUNTER — TRANSCRIPTION ENCOUNTER (OUTPATIENT)
Age: 81
End: 2021-08-05

## 2021-08-09 LAB
25(OH)D3 SERPL-MCNC: 36.1 NG/ML
ALBUMIN SERPL ELPH-MCNC: 4.4 G/DL
ALP BLD-CCNC: 82 U/L
ALT SERPL-CCNC: 16 U/L
ANION GAP SERPL CALC-SCNC: 12 MMOL/L
APPEARANCE: CLEAR
AST SERPL-CCNC: 26 U/L
BASOPHILS # BLD AUTO: 0.04 K/UL
BASOPHILS NFR BLD AUTO: 0.6 %
BILIRUB SERPL-MCNC: 0.3 MG/DL
BILIRUBIN URINE: NEGATIVE
BLOOD URINE: NEGATIVE
BUN SERPL-MCNC: 38 MG/DL
CALCIUM SERPL-MCNC: 10.5 MG/DL
CHLORIDE SERPL-SCNC: 106 MMOL/L
CHOLEST SERPL-MCNC: 223 MG/DL
CO2 SERPL-SCNC: 22 MMOL/L
COLOR: YELLOW
CREAT SERPL-MCNC: 1.62 MG/DL
EOSINOPHIL # BLD AUTO: 0.14 K/UL
EOSINOPHIL NFR BLD AUTO: 1.9 %
ESTIMATED AVERAGE GLUCOSE: 114 MG/DL
GLUCOSE QUALITATIVE U: NEGATIVE
GLUCOSE SERPL-MCNC: 96 MG/DL
HBA1C MFR BLD HPLC: 5.6 %
HCT VFR BLD CALC: 39.1 %
HCV AB SER QL: NONREACTIVE
HCV S/CO RATIO: 0.3 S/CO
HDLC SERPL-MCNC: 93 MG/DL
HGB BLD-MCNC: 12.3 G/DL
IMM GRANULOCYTES NFR BLD AUTO: 0.4 %
KETONES URINE: NEGATIVE
LDLC SERPL CALC-MCNC: 116 MG/DL
LEUKOCYTE ESTERASE URINE: ABNORMAL
LYMPHOCYTES # BLD AUTO: 1.28 K/UL
LYMPHOCYTES NFR BLD AUTO: 17.8 %
MAN DIFF?: NORMAL
MCHC RBC-ENTMCNC: 30.8 PG
MCHC RBC-ENTMCNC: 31.5 GM/DL
MCV RBC AUTO: 97.8 FL
MONOCYTES # BLD AUTO: 0.78 K/UL
MONOCYTES NFR BLD AUTO: 10.9 %
NEUTROPHILS # BLD AUTO: 4.91 K/UL
NEUTROPHILS NFR BLD AUTO: 68.4 %
NITRITE URINE: NEGATIVE
NONHDLC SERPL-MCNC: 131 MG/DL
PH URINE: 6.5
PLATELET # BLD AUTO: 237 K/UL
POTASSIUM SERPL-SCNC: 4.9 MMOL/L
PROT SERPL-MCNC: 7.1 G/DL
PROTEIN URINE: NORMAL
RBC # BLD: 4 M/UL
RBC # FLD: 15.4 %
SODIUM SERPL-SCNC: 141 MMOL/L
SPECIFIC GRAVITY URINE: 1.02
TRIGL SERPL-MCNC: 71 MG/DL
TSH SERPL-ACNC: 0.86 UIU/ML
URATE SERPL-MCNC: 8.8 MG/DL
UROBILINOGEN URINE: NORMAL
WBC # FLD AUTO: 7.18 K/UL

## 2021-08-10 ENCOUNTER — NON-APPOINTMENT (OUTPATIENT)
Age: 81
End: 2021-08-10

## 2021-08-11 ENCOUNTER — APPOINTMENT (OUTPATIENT)
Dept: INTERNAL MEDICINE | Facility: CLINIC | Age: 81
End: 2021-08-11
Payer: MEDICARE

## 2021-08-11 VITALS
DIASTOLIC BLOOD PRESSURE: 84 MMHG | RESPIRATION RATE: 18 BRPM | SYSTOLIC BLOOD PRESSURE: 152 MMHG | HEART RATE: 62 BPM | OXYGEN SATURATION: 97 % | TEMPERATURE: 98 F | HEIGHT: 64 IN

## 2021-08-11 DIAGNOSIS — G89.29 EPIGASTRIC PAIN: ICD-10-CM

## 2021-08-11 DIAGNOSIS — R10.13 EPIGASTRIC PAIN: ICD-10-CM

## 2021-08-11 PROCEDURE — 99213 OFFICE O/P EST LOW 20 MIN: CPT | Mod: 25

## 2021-08-11 PROCEDURE — 36415 COLL VENOUS BLD VENIPUNCTURE: CPT

## 2021-08-11 RX ORDER — FOLIC ACID 5 MG
5 CAPSULE ORAL AS DIRECTED
Qty: 30 | Refills: 0 | Status: DISCONTINUED | COMMUNITY
Start: 2021-08-06 | End: 2021-08-11

## 2021-08-11 RX ORDER — LANSOPRAZOLE 15 MG/1
15 CAPSULE, DELAYED RELEASE ORAL
Refills: 3 | Status: ACTIVE | COMMUNITY

## 2021-08-11 NOTE — HISTORY OF PRESENT ILLNESS
[FreeTextEntry1] : follow up [de-identified] : gout\par - tolerated probenecid welll\par - would like 90 day refill\par \par abdominal discomfort\par - chronic issue, secondary to h/o abdominal surgeries and abdominal mesothelioma

## 2021-08-23 ENCOUNTER — NON-APPOINTMENT (OUTPATIENT)
Age: 81
End: 2021-08-23

## 2021-08-23 ENCOUNTER — APPOINTMENT (OUTPATIENT)
Dept: NEPHROLOGY | Facility: CLINIC | Age: 81
End: 2021-08-23
Payer: MEDICARE

## 2021-08-23 VITALS
HEART RATE: 66 BPM | TEMPERATURE: 98.1 F | SYSTOLIC BLOOD PRESSURE: 176 MMHG | DIASTOLIC BLOOD PRESSURE: 85 MMHG | HEIGHT: 64 IN | OXYGEN SATURATION: 99 % | RESPIRATION RATE: 12 BRPM | BODY MASS INDEX: 27.83 KG/M2 | WEIGHT: 163 LBS

## 2021-08-23 PROCEDURE — 99215 OFFICE O/P EST HI 40 MIN: CPT

## 2021-08-23 NOTE — PHYSICAL EXAM
[General Appearance - Alert] : alert [General Appearance - In No Acute Distress] : in no acute distress [Sclera] : the sclera and conjunctiva were normal [PERRL With Normal Accommodation] : pupils were equal in size, round, and reactive to light [Extraocular Movements] : extraocular movements were intact [Neck Appearance] : the appearance of the neck was normal [Neck Cervical Mass (___cm)] : no neck mass was observed [Thyroid Diffuse Enlargement] : the thyroid was not enlarged [Thyroid Nodule] : there were no palpable thyroid nodules [Respiration, Rhythm And Depth] : normal respiratory rhythm and effort [Exaggerated Use Of Accessory Muscles For Inspiration] : no accessory muscle use [Auscultation Breath Sounds / Voice Sounds] : lungs were clear to auscultation bilaterally [Heart Rate And Rhythm] : heart rate was normal and rhythm regular [Heart Sounds] : normal S1 and S2 [Heart Sounds Gallop] : no gallops [Murmurs] : no murmurs [Heart Sounds Pericardial Friction Rub] : no pericardial rub [FreeTextEntry1] : Mild edema bilaterally to shins [Bowel Sounds] : normal bowel sounds [Abdomen Soft] : soft [Abdomen Tenderness] : non-tender [Abdomen Mass (___ Cm)] : no abdominal mass palpated [No CVA Tenderness] : no ~M costovertebral angle tenderness [No Spinal Tenderness] : no spinal tenderness [Skin Color & Pigmentation] : normal skin color and pigmentation [Skin Turgor] : normal skin turgor [] : no rash

## 2021-08-23 NOTE — HISTORY OF PRESENT ILLNESS
[FreeTextEntry1] : Patient is a 49 yo F with CKD 4, Gout, HTN, HLD, Hypothyroidism, obesity, arthritis, multiple drug allergies presenting for follow up after concern for many side effects from recent start of metoprolol and probenacid. Patient has been feeling depressed, swollen legs, fatigue, and times when she has sweat through her clothing for unclear reasons. Feels she has put on weight, although on her scale she has only put on one pound. Appetite has been off, lowered. Sweating has happened once, but has had a lot of heat intolerance which has been new. BPs usually 120/76 if on medications. Patient feels she cannot tolerate metoprolol anymore\par \par

## 2021-08-23 NOTE — CONSULT LETTER
[Dear  ___] : Dear  [unfilled], [Courtesy Letter:] : I had the pleasure of seeing your patient, [unfilled], in my office today. [Please see my note below.] : Please see my note below. [Consult Closing:] : Thank you very much for allowing me to participate in the care of this patient.  If you have any questions, please do not hesitate to contact me. [FreeTextEntry1] : She has had a lot of side effects which could be attributed to metoprolol, and so we are stopping it to see if her symptoms improved and restarted her HCTZ for now.  [FreeTextEntry3] : Warm regards,\par Bjorn Niño MD MA

## 2021-08-23 NOTE — ASSESSMENT
[FreeTextEntry1] : 81 yo F with CKD 4, Gout, HTN, HLD, Hypothyroidism, obesity, arthritis, multiple drug allergies presenting for follow up after concern for many side effects from recent start of metoprolol and probenacid.\par \par HTN - stop metoprolol, start HCTZ 25mg PO QD, cut out all salt from diet. Continue amlodipine 10mg po QD\par \par CKD4 - Recheck labs today\par \par Gout - continue probenacid\par \par HLD - continue medications as prescribed\par \par Hypothyroidism - on synthroid 100mcg, recheck TSH \par \par Depression, lethargy - appears to be due to metoprolol, will stop and see if improved, patient will call if not improved off of metoprolol in a week or if HTN is not controlled with HCTZ\par \par RTC with Dr. Love as scheduled if improved.

## 2021-08-24 ENCOUNTER — NON-APPOINTMENT (OUTPATIENT)
Age: 81
End: 2021-08-24

## 2021-08-24 ENCOUNTER — TRANSCRIPTION ENCOUNTER (OUTPATIENT)
Age: 81
End: 2021-08-24

## 2021-08-25 LAB
ALBUMIN SERPL ELPH-MCNC: 4.2 G/DL
ALDOSTERONE SERUM: 20.6 NG/DL
ALP BLD-CCNC: 73 U/L
ALT SERPL-CCNC: 17 U/L
ANION GAP SERPL CALC-SCNC: 12 MMOL/L
APPEARANCE: CLEAR
AST SERPL-CCNC: 22 U/L
BACTERIA: NEGATIVE
BASOPHILS # BLD AUTO: 0.04 K/UL
BASOPHILS NFR BLD AUTO: 0.6 %
BILIRUB SERPL-MCNC: 0.2 MG/DL
BILIRUBIN URINE: NEGATIVE
BLOOD URINE: NEGATIVE
BUN SERPL-MCNC: 26 MG/DL
CA-I SERPL-SCNC: 1.38 MMOL/L
CALCIUM SERPL-MCNC: 11 MG/DL
CHLORIDE SERPL-SCNC: 104 MMOL/L
CO2 SERPL-SCNC: 25 MMOL/L
COLOR: NORMAL
CREAT SERPL-MCNC: 1.56 MG/DL
CREAT SPEC-SCNC: 73 MG/DL
CREAT/PROT UR: 0.2 RATIO
EOSINOPHIL # BLD AUTO: 0.15 K/UL
EOSINOPHIL NFR BLD AUTO: 2.4 %
GLUCOSE QUALITATIVE U: NEGATIVE
GLUCOSE SERPL-MCNC: 99 MG/DL
HCT VFR BLD CALC: 39 %
HGB BLD-MCNC: 12.2 G/DL
HYALINE CASTS: 1 /LPF
IMM GRANULOCYTES NFR BLD AUTO: 0.3 %
KETONES URINE: NEGATIVE
LEUKOCYTE ESTERASE URINE: ABNORMAL
LYMPHOCYTES # BLD AUTO: 1.26 K/UL
LYMPHOCYTES NFR BLD AUTO: 20 %
MAN DIFF?: NORMAL
MCHC RBC-ENTMCNC: 30.8 PG
MCHC RBC-ENTMCNC: 31.3 GM/DL
MCV RBC AUTO: 98.5 FL
MICROSCOPIC-UA: NORMAL
MONOCYTES # BLD AUTO: 0.69 K/UL
MONOCYTES NFR BLD AUTO: 11 %
NEUTROPHILS # BLD AUTO: 4.13 K/UL
NEUTROPHILS NFR BLD AUTO: 65.7 %
NITRITE URINE: NEGATIVE
PH URINE: 6
PHOSPHATE SERPL-MCNC: 3 MG/DL
PLATELET # BLD AUTO: 303 K/UL
POTASSIUM SERPL-SCNC: 4.4 MMOL/L
POTASSIUM UR-SCNC: 54.3 MMOL/L
PROT SERPL-MCNC: 6.9 G/DL
PROT UR-MCNC: 15 MG/DL
PROTEIN URINE: NORMAL
RBC # BLD: 3.96 M/UL
RBC # FLD: 14.5 %
RED BLOOD CELLS URINE: 0 /HPF
SODIUM ?TM SUB UR QN: 102 MMOL/L
SODIUM SERPL-SCNC: 142 MMOL/L
SPECIFIC GRAVITY URINE: 1.02
SQUAMOUS EPITHELIAL CELLS: 3 /HPF
TSH SERPL-ACNC: 0.27 UIU/ML
UROBILINOGEN URINE: NORMAL
WBC # FLD AUTO: 6.29 K/UL
WHITE BLOOD CELLS URINE: 8 /HPF

## 2021-08-27 ENCOUNTER — TRANSCRIPTION ENCOUNTER (OUTPATIENT)
Age: 81
End: 2021-08-27

## 2021-08-27 ENCOUNTER — NON-APPOINTMENT (OUTPATIENT)
Age: 81
End: 2021-08-27

## 2021-08-30 LAB
METANEPHRINE, PL: 16.4 PG/ML
NORMETANEPHRINE, PL: 111.6 PG/ML
RENIN ACTIVITY, PLASMA: 0.53 NG/ML/HR

## 2021-08-31 LAB
ALBUMIN MFR SERPL ELPH: 57.8 %
ALBUMIN SERPL-MCNC: 4 G/DL
ALBUMIN/GLOB SERPL: 1.4 RATIO
ALBUPE: 36.3 %
ALPHA1 GLOB MFR SERPL ELPH: 4.9 %
ALPHA1 GLOB SERPL ELPH-MCNC: 0.3 G/DL
ALPHA1UPE: 28.4 %
ALPHA2 GLOB MFR SERPL ELPH: 10.6 %
ALPHA2 GLOB SERPL ELPH-MCNC: 0.7 G/DL
ALPHA2UPE: 14.3 %
B-GLOBULIN MFR SERPL ELPH: 10.6 %
B-GLOBULIN SERPL ELPH-MCNC: 0.7 G/DL
BETAUPE: 13 %
CREAT 24H UR-MCNC: NORMAL G/24 H
CREATININE UR (MAYO): 74 MG/DL
GAMMA GLOB FLD ELPH-MCNC: 1.1 G/DL
GAMMA GLOB MFR SERPL ELPH: 16.1 %
GAMMAUPE: 8 %
IGA 24H UR QL IFE: NORMAL
INTERPRETATION SERPL IEP-IMP: NORMAL
KAPPA LC 24H UR QL: NORMAL
PROT PATTERN 24H UR ELPH-IMP: NORMAL
PROT SERPL-MCNC: 6.9 G/DL
PROT SERPL-MCNC: 6.9 G/DL
PROT UR-MCNC: 14 MG/DL
PROT UR-MCNC: 14 MG/DL
SPECIMEN VOL 24H UR: NORMAL ML

## 2021-09-01 ENCOUNTER — TRANSCRIPTION ENCOUNTER (OUTPATIENT)
Age: 81
End: 2021-09-01

## 2021-09-02 ENCOUNTER — TRANSCRIPTION ENCOUNTER (OUTPATIENT)
Age: 81
End: 2021-09-02

## 2021-09-13 ENCOUNTER — NON-APPOINTMENT (OUTPATIENT)
Age: 81
End: 2021-09-13

## 2021-09-16 ENCOUNTER — APPOINTMENT (OUTPATIENT)
Dept: NEPHROLOGY | Facility: CLINIC | Age: 81
End: 2021-09-16
Payer: MEDICARE

## 2021-09-16 VITALS
HEART RATE: 60 BPM | OXYGEN SATURATION: 97 % | DIASTOLIC BLOOD PRESSURE: 60 MMHG | RESPIRATION RATE: 12 BRPM | SYSTOLIC BLOOD PRESSURE: 148 MMHG

## 2021-09-16 PROCEDURE — 99215 OFFICE O/P EST HI 40 MIN: CPT

## 2021-09-16 NOTE — PHYSICAL EXAM
[General Appearance - Alert] : alert [General Appearance - In No Acute Distress] : in no acute distress [Sclera] : the sclera and conjunctiva were normal [PERRL With Normal Accommodation] : pupils were equal in size, round, and reactive to light [Extraocular Movements] : extraocular movements were intact [Neck Appearance] : the appearance of the neck was normal [Neck Cervical Mass (___cm)] : no neck mass was observed [Jugular Venous Distention Increased] : there was no jugular-venous distention [Thyroid Diffuse Enlargement] : the thyroid was not enlarged [Thyroid Nodule] : there were no palpable thyroid nodules [Respiration, Rhythm And Depth] : normal respiratory rhythm and effort [Exaggerated Use Of Accessory Muscles For Inspiration] : no accessory muscle use [Auscultation Breath Sounds / Voice Sounds] : lungs were clear to auscultation bilaterally [Heart Rate And Rhythm] : heart rate was normal and rhythm regular [Heart Sounds] : normal S1 and S2 [Heart Sounds Gallop] : no gallops [Murmurs] : no murmurs [Heart Sounds Pericardial Friction Rub] : no pericardial rub [Edema] : there was no peripheral edema [Veins - Varicosity Changes] : there were no varicosital changes [Bowel Sounds] : normal bowel sounds [Abdomen Soft] : soft [Abdomen Tenderness] : non-tender [Abdomen Mass (___ Cm)] : no abdominal mass palpated [No CVA Tenderness] : no ~M costovertebral angle tenderness [No Spinal Tenderness] : no spinal tenderness [FreeTextEntry1] : Severely tender on medial side of first digit.  [Skin Color & Pigmentation] : normal skin color and pigmentation [Skin Turgor] : normal skin turgor [] : no rash

## 2021-09-16 NOTE — ASSESSMENT
[FreeTextEntry1] : 79 yo F with CKD 4, Gout, HTN, HLD, Hypothyroidism, obesity, arthritis, multiple drug allergies presenting for follow up after concern for many side effects from recent start of metoprolol and probenacid.\par \par HTN - Continue HCTZ 25mg PO QD, cut out all salt from diet. Continue amlodipine 10mg po QD\par \par CKD4 - Recheck labs prior to seeing Dr. Love\par \par Gout - continue probenacid, Continue prednisone 20 until monday then slow taper as prescribed\par \par HLD - continue medications as prescribed\par \par Hypothyroidism - on synthroid 100mcg\par \par Depression, lethargy - improved off metoprolol\par \par RTC with Dr. Love as scheduled if improved.

## 2021-09-16 NOTE — HISTORY OF PRESENT ILLNESS
[FreeTextEntry1] : 51 yo F with CKD 4, Gout, HTN, HLD, Hypothyroidism, obesity, arthritis, multiple drug allergies presenting for follow up. Overall tolerating the probenacid but came down with gout attack, started on prednisone 20mg monday. \par Still some pain in toe, but swelling has come down. \par Foot doctor did not want to do cortisone shot, wanted to continue systemic meds because was so sensitive\par Will continue to monday and then come down in 2.5mg intervals as she has done in the past\par BP logs brought in all prior to medication: 140-150s mostly, 160 and 165 as highest. \par

## 2021-10-07 ENCOUNTER — APPOINTMENT (OUTPATIENT)
Dept: NEPHROLOGY | Facility: CLINIC | Age: 81
End: 2021-10-07
Payer: MEDICARE

## 2021-10-07 VITALS — DIASTOLIC BLOOD PRESSURE: 64 MMHG | HEART RATE: 74 BPM | OXYGEN SATURATION: 97 % | SYSTOLIC BLOOD PRESSURE: 146 MMHG

## 2021-10-07 LAB
ALBUMIN SERPL ELPH-MCNC: 4.3 G/DL
ANION GAP SERPL CALC-SCNC: 14 MMOL/L
APPEARANCE: CLEAR
BACTERIA: NEGATIVE
BASOPHILS # BLD AUTO: 0.03 K/UL
BASOPHILS NFR BLD AUTO: 0.4 %
BILIRUBIN URINE: NEGATIVE
BLOOD URINE: NEGATIVE
BUN SERPL-MCNC: 38 MG/DL
CALCIUM SERPL-MCNC: 10.3 MG/DL
CHLORIDE SERPL-SCNC: 103 MMOL/L
CO2 SERPL-SCNC: 24 MMOL/L
COLOR: YELLOW
CREAT SERPL-MCNC: 1.7 MG/DL
CREAT SPEC-SCNC: 81 MG/DL
CREAT/PROT UR: 0.2 RATIO
EOSINOPHIL # BLD AUTO: 0.15 K/UL
EOSINOPHIL NFR BLD AUTO: 2.1 %
GLUCOSE QUALITATIVE U: NEGATIVE
GLUCOSE SERPL-MCNC: 101 MG/DL
HCT VFR BLD CALC: 38.7 %
HGB BLD-MCNC: 11.8 G/DL
HYALINE CASTS: 1 /LPF
IMM GRANULOCYTES NFR BLD AUTO: 0.4 %
KETONES URINE: NEGATIVE
LEUKOCYTE ESTERASE URINE: ABNORMAL
LYMPHOCYTES # BLD AUTO: 1.7 K/UL
LYMPHOCYTES NFR BLD AUTO: 23.7 %
MAN DIFF?: NORMAL
MCHC RBC-ENTMCNC: 30.4 PG
MCHC RBC-ENTMCNC: 30.5 GM/DL
MCV RBC AUTO: 99.7 FL
MICROSCOPIC-UA: NORMAL
MONOCYTES # BLD AUTO: 0.78 K/UL
MONOCYTES NFR BLD AUTO: 10.9 %
NEUTROPHILS # BLD AUTO: 4.48 K/UL
NEUTROPHILS NFR BLD AUTO: 62.5 %
NITRITE URINE: NEGATIVE
PH URINE: 6
PHOSPHATE SERPL-MCNC: 3.2 MG/DL
PLATELET # BLD AUTO: 252 K/UL
POTASSIUM SERPL-SCNC: 3.9 MMOL/L
PROT UR-MCNC: 15 MG/DL
PROTEIN URINE: NORMAL
RBC # BLD: 3.88 M/UL
RBC # FLD: 15.4 %
RED BLOOD CELLS URINE: 1 /HPF
SODIUM SERPL-SCNC: 141 MMOL/L
SPECIFIC GRAVITY URINE: 1.02
SQUAMOUS EPITHELIAL CELLS: 3 /HPF
URATE SERPL-MCNC: 6.5 MG/DL
URATE UR-MCNC: 40.8 MG/DL
UROBILINOGEN URINE: NORMAL
WBC # FLD AUTO: 7.17 K/UL
WHITE BLOOD CELLS URINE: 11 /HPF

## 2021-10-07 PROCEDURE — 99214 OFFICE O/P EST MOD 30 MIN: CPT

## 2021-10-08 ENCOUNTER — APPOINTMENT (OUTPATIENT)
Dept: INTERNAL MEDICINE | Facility: CLINIC | Age: 81
End: 2021-10-08
Payer: MEDICARE

## 2021-10-08 ENCOUNTER — TRANSCRIPTION ENCOUNTER (OUTPATIENT)
Age: 81
End: 2021-10-08

## 2021-10-08 ENCOUNTER — APPOINTMENT (OUTPATIENT)
Dept: HEART AND VASCULAR | Facility: CLINIC | Age: 81
End: 2021-10-08
Payer: MEDICARE

## 2021-10-08 VITALS
OXYGEN SATURATION: 97 % | DIASTOLIC BLOOD PRESSURE: 90 MMHG | HEIGHT: 64 IN | SYSTOLIC BLOOD PRESSURE: 167 MMHG | BODY MASS INDEX: 27.66 KG/M2 | TEMPERATURE: 98.6 F | HEART RATE: 70 BPM | WEIGHT: 162 LBS

## 2021-10-08 VITALS — SYSTOLIC BLOOD PRESSURE: 167 MMHG | DIASTOLIC BLOOD PRESSURE: 73 MMHG

## 2021-10-08 VITALS
DIASTOLIC BLOOD PRESSURE: 80 MMHG | WEIGHT: 162 LBS | BODY MASS INDEX: 27.66 KG/M2 | HEART RATE: 67 BPM | SYSTOLIC BLOOD PRESSURE: 165 MMHG | HEIGHT: 64 IN | TEMPERATURE: 96.4 F

## 2021-10-08 VITALS — SYSTOLIC BLOOD PRESSURE: 158 MMHG | DIASTOLIC BLOOD PRESSURE: 90 MMHG

## 2021-10-08 DIAGNOSIS — Z23 ENCOUNTER FOR IMMUNIZATION: ICD-10-CM

## 2021-10-08 PROCEDURE — 99214 OFFICE O/P EST MOD 30 MIN: CPT

## 2021-10-08 PROCEDURE — 36415 COLL VENOUS BLD VENIPUNCTURE: CPT

## 2021-10-08 PROCEDURE — 90662 IIV NO PRSV INCREASED AG IM: CPT

## 2021-10-08 PROCEDURE — 93880 EXTRACRANIAL BILAT STUDY: CPT

## 2021-10-08 PROCEDURE — G0008: CPT

## 2021-10-08 PROCEDURE — 99213 OFFICE O/P EST LOW 20 MIN: CPT | Mod: 25

## 2021-10-08 RX ORDER — METOPROLOL TARTRATE 25 MG/1
25 TABLET, FILM COATED ORAL
Qty: 30 | Refills: 0 | Status: DISCONTINUED | COMMUNITY
Start: 2021-07-29 | End: 2021-10-08

## 2021-10-08 NOTE — HISTORY OF PRESENT ILLNESS
[FreeTextEntry1] : follow up/flu vax [de-identified] : h/o abdominal mesothelioma\par - would like second opinion\par - would like GI/ONC specialist\par \par HTN\par - is now on HCTZ 12.5 instead of 25 b/c of CKD and norvasc 10mg\par - BP has been running high recently\par - believes may be from recent prednisone use (last use was 6 days ago)\par - is also feeling very jittery lately\par \par HCM\par - needs flu vax today\par - would like to wait for pfizer booster until feeling 100% better\par - would like nutritionist evaluation.

## 2021-10-09 NOTE — HISTORY OF PRESENT ILLNESS
[FreeTextEntry1] : 80F presents to establish care for her history of cardiac murmur, hypertension, dilated aortic root, coronary artery calcification on CT chest. \par \par She just got off of prednisone on Sunday and she reports that that often raises her blood pressure. She reports that it had been down to 123 at home prior to the prednisone. \par \par She is doing well. She bikes up to 10 miles a day without chest pain, dyspnea, syncope, pre syncope. No palpitations, has mild chronic peripheral edema, has no orthopnea or PND.\par \par With regards to hypertension, she follows Dr Love and there was a plan to consider ARB due to concern about ramipril causing systemic mastocytosis (though work up was reportedly negative). She tried an ACE, but feels that she had a side effect. \par \par Prior History\par She reports intolerance to statins with myalgias - though doesn't remember which ones she has tried. She had severe bloating with ?cholestyramine. Not sure if she has tried zetia previously. 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 Has occasional anxiety, no symptoms of JOHN. menopause at 53. No pregnancy complications.\par \par Data:\par 10/8/2021- Echo- moderately dilated LA, mild concentric LVH, nl LV szie and fx, aortic arch plaque\par CT chest 2/20: Dilated aortic root 4.1cm. Incidental Coronary artery calcification.\par Lipids: , Tg 71, Tchol 223, N \par \par 8/23/2021- cr- 1.56, GFR 32\par \par CV risk factors:\par Coronary calcium\par HLD\par CKD (GFR 30)\par Proteinuria\par HTN, uncontrolled\par \par

## 2021-10-09 NOTE — DISCUSSION/SUMMARY
[FreeTextEntry1] : 80F presents to establish care for her history of cardiac murmur, hypertension, dilated aortic root, coronary artery calcification on CT chest. \par \par Referred to GI for additional opinion on monitoring in the setting of prior cancer as per her request. \par \par HTN\par BP remains elevated. Will have her get an ABPM if she is agreeable through Dr. Love's office or have her go up on her meds. \par \par HLD\par Her cholesterol remains elevated and she is not able to tolerate a statin. She is now agreeable to start zetia and has the prescription. Repeat labs in 8 weeks. \par \par Dilated ascending aorta\par Repeat CTA to assess size of aorta and if stable, repeat every other year. Will confirm with Dr. Love whether MRA is preferred.  \par \par REturn for follow-up in 2-3 months.

## 2021-10-09 NOTE — PHYSICAL EXAM
[General Appearance - Alert] : alert [General Appearance - In No Acute Distress] : in no acute distress [] : no respiratory distress [Respiration, Rhythm And Depth] : normal respiratory rhythm and effort [Exaggerated Use Of Accessory Muscles For Inspiration] : no accessory muscle use [Auscultation Breath Sounds / Voice Sounds] : lungs were clear to auscultation bilaterally [Heart Rate And Rhythm] : heart rate was normal and rhythm regular [Heart Sounds] : normal S1 and S2 [Heart Sounds Gallop] : no gallops [Murmurs] : no murmurs [Heart Sounds Pericardial Friction Rub] : no pericardial rub [Edema] : there was no peripheral edema [Veins - Varicosity Changes] : there were no varicosital changes [Oriented To Time, Place, And Person] : oriented to person, place, and time [Impaired Insight] : insight and judgment were intact [Affect] : the affect was normal [FreeTextEntry1] : Obesity

## 2021-10-09 NOTE — HISTORY OF PRESENT ILLNESS
[FreeTextEntry1] : 79 yo woman here for f/u evaluation of CKD 4, Gout, HTN, HLD, Hypothyroidism, obesity, arthritis, multiple drug allergies.\par tolerating probenecid- taking every night-  but did have 2 episodes of gout since starting --> elbow, then foot-  treated with prednisone 10 mg then taper down in both instances.\par using benadryl with probenecid as suggested by allergy- using every few days- \par suggested to start zetia since intolerant of stain- okay from renal perspective- will start this week\par ramipril was changed to metoprolol (elevated mast cells) in beginning of August; subsequently developed depressive symptoms- metoprolol dc'd and HCTZ added, initially 25 mg, then reduced to 1/2 tablet daily. depression resolved.\par occ sensation of fullness in throat- not sore and no SOB- will see if benadryl  improves it or not. for allergy f/u\par \par \par

## 2021-10-09 NOTE — ASSESSMENT
[FreeTextEntry1] : all lab data was reviewed with patient in detail from 9/30/2021\par 81 yo woman with CKD 4, Gout, HTN, HLD, Hypothyroidism, obesity, arthritis, multiple drug allergies  BP above goal- but just completing 2 rounds of prednisone\par UA level better\par creat stable\par depressive feelings improved off metoprolol\par \par \par HTN \par CKD4\par Gout \par HLD\par Hypothyroidism \par Depression, lethargy \par \par f/u 2/2022\par

## 2021-10-12 ENCOUNTER — TRANSCRIPTION ENCOUNTER (OUTPATIENT)
Age: 81
End: 2021-10-12

## 2021-10-18 ENCOUNTER — NON-APPOINTMENT (OUTPATIENT)
Age: 81
End: 2021-10-18

## 2021-10-18 ENCOUNTER — TRANSCRIPTION ENCOUNTER (OUTPATIENT)
Age: 81
End: 2021-10-18

## 2021-10-19 ENCOUNTER — OUTPATIENT (OUTPATIENT)
Dept: OUTPATIENT SERVICES | Facility: HOSPITAL | Age: 81
LOS: 1 days | End: 2021-10-19
Payer: MEDICARE

## 2021-10-19 ENCOUNTER — APPOINTMENT (OUTPATIENT)
Dept: INTERNAL MEDICINE | Facility: CLINIC | Age: 81
End: 2021-10-19
Payer: MEDICARE

## 2021-10-19 ENCOUNTER — TRANSCRIPTION ENCOUNTER (OUTPATIENT)
Age: 81
End: 2021-10-19

## 2021-10-19 ENCOUNTER — APPOINTMENT (OUTPATIENT)
Dept: INTERNAL MEDICINE | Facility: CLINIC | Age: 81
End: 2021-10-19

## 2021-10-19 ENCOUNTER — APPOINTMENT (OUTPATIENT)
Dept: ULTRASOUND IMAGING | Facility: HOSPITAL | Age: 81
End: 2021-10-19
Payer: MEDICARE

## 2021-10-19 VITALS — SYSTOLIC BLOOD PRESSURE: 165 MMHG | DIASTOLIC BLOOD PRESSURE: 79 MMHG

## 2021-10-19 VITALS
DIASTOLIC BLOOD PRESSURE: 89 MMHG | HEIGHT: 64 IN | HEART RATE: 69 BPM | BODY MASS INDEX: 27.66 KG/M2 | OXYGEN SATURATION: 97 % | WEIGHT: 162 LBS | TEMPERATURE: 98.3 F | SYSTOLIC BLOOD PRESSURE: 172 MMHG

## 2021-10-19 DIAGNOSIS — M79.89 OTHER SPECIFIED SOFT TISSUE DISORDERS: ICD-10-CM

## 2021-10-19 DIAGNOSIS — Z90.710 ACQUIRED ABSENCE OF BOTH CERVIX AND UTERUS: Chronic | ICD-10-CM

## 2021-10-19 PROCEDURE — 93880 EXTRACRANIAL BILAT STUDY: CPT

## 2021-10-19 PROCEDURE — 99213 OFFICE O/P EST LOW 20 MIN: CPT

## 2021-10-19 PROCEDURE — 93880 EXTRACRANIAL BILAT STUDY: CPT | Mod: 26

## 2021-10-19 PROCEDURE — 93970 EXTREMITY STUDY: CPT | Mod: 26

## 2021-10-19 PROCEDURE — 93970 EXTREMITY STUDY: CPT

## 2021-10-19 NOTE — HISTORY OF PRESENT ILLNESS
[de-identified] : right foot swelling\par - started last week\par - 4 days ago started prednisone 5mg daily x 3 days\par - switched to 2.5mg today\par - feels much better on steroids, pain has resolved and swelling is improving\par - previously followed w/ Dr. Lee, needs new rheum\par \par HA\par - frontal, mild\par - started after initiating HCTZ\par \par CKD\par - did not start farxiga, concerned about side effects she read online. \par \par \par HTN\par - attributed to prednisone and stressful 2 days

## 2021-10-19 NOTE — PHYSICAL EXAM
[Normal] : normal rate, regular rhythm, normal S1 and S2 and no murmur heard [de-identified] : right foot pitting edema below ankle, tender w/ mild erythema

## 2021-10-30 ENCOUNTER — TRANSCRIPTION ENCOUNTER (OUTPATIENT)
Age: 81
End: 2021-10-30

## 2021-11-02 ENCOUNTER — APPOINTMENT (OUTPATIENT)
Dept: INTERNAL MEDICINE | Facility: CLINIC | Age: 81
End: 2021-11-02
Payer: MEDICARE

## 2021-11-02 VITALS — WEIGHT: 161 LBS | BODY MASS INDEX: 27.64 KG/M2

## 2021-11-02 PROCEDURE — 97802 MEDICAL NUTRITION INDIV IN: CPT

## 2021-11-04 ENCOUNTER — TRANSCRIPTION ENCOUNTER (OUTPATIENT)
Age: 81
End: 2021-11-04

## 2021-11-05 ENCOUNTER — RX RENEWAL (OUTPATIENT)
Age: 81
End: 2021-11-05

## 2021-11-08 ENCOUNTER — APPOINTMENT (OUTPATIENT)
Dept: HEART AND VASCULAR | Facility: CLINIC | Age: 81
End: 2021-11-08
Payer: MEDICARE

## 2021-11-08 VITALS
HEART RATE: 75 BPM | WEIGHT: 159 LBS | HEIGHT: 64 IN | DIASTOLIC BLOOD PRESSURE: 73 MMHG | BODY MASS INDEX: 27.14 KG/M2 | OXYGEN SATURATION: 97 % | TEMPERATURE: 97.2 F | SYSTOLIC BLOOD PRESSURE: 156 MMHG

## 2021-11-08 PROCEDURE — 99214 OFFICE O/P EST MOD 30 MIN: CPT

## 2021-11-08 RX ORDER — DAPAGLIFLOZIN 10 MG/1
10 TABLET, FILM COATED ORAL DAILY
Qty: 30 | Refills: 5 | Status: DISCONTINUED | COMMUNITY
Start: 2021-10-18 | End: 2021-11-08

## 2021-11-09 NOTE — HISTORY OF PRESENT ILLNESS
[FreeTextEntry1] : 80 F presents for follow-up of cardiac murmur, hypertension, dilated aortic root, coronary artery calcification on CT chest. \par \par She now has an active pain attack in her right knee and metatarsal joint -worst was on Thursday night.  She has also been using prednisone at 5 mg. She also had a burning sensation on her right foot.  She feels this is not her typical gout attack, but is unclear of the etiology. \par \par She has been taking HCTZ 12.5 mg daily instead of 25 mg every other day. \par \par She used to get headaches, but not lately. She also gets vertigo, but not in the past few days. She had headaches in different parts of her head that are now better. \par \par Prior History\par She reports intolerance to statins with myalgias - though doesn't remember which ones she has tried. She had severe bloating with ?cholestyramine. Not sure if she has tried zetia previously. \par \par With regards to hypertension, she follows Dr Love and there was a plan to consider ARB due to concern about ramipril causing systemic mastocytosis (though work up was reportedly negative). She tried an ACE, but feels that she had a side effect. 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 Has occasional anxiety, no symptoms of JOHN. menopause at 53. No pregnancy complications.\par \par Data:\par 10/8/2021- Echo- moderately dilated LA, mild concentric LVH, nl LV szie and fx, aortic arch plaque\par CT chest 2/20: Dilated aortic root 4.1cm. Incidental Coronary artery calcification.\par Lipids: , Tg 71, Tchol 223, N \par \par 8/23/2021- cr- 1.56, GFR 32\par \par CV risk factors:\par Coronary calcium\par HLD\par CKD (GFR 30)\par Proteinuria\par HTN, uncontrolled\par \par MR angio chest 10-\par Cardiac: Normal pericardiam, normal chamber size. Trileaflet aortic valve. Trace AR, MARILYN 3.5cm. Normal calibre aorta with annulus 22mm, sinus of valsalva 34mm, ascending aorta 37mm, arch 30mm\par PA: normal caliber. \par

## 2021-11-09 NOTE — DISCUSSION/SUMMARY
[FreeTextEntry1] : 80 F presents for follow-up of cardiac murmur, hypertension, coronary artery calcification on CT chest. \par \par Aorta- Dimensions noted to be normal on recent MRI. Will only monitor echoes in the future for LVH . \par \par ASCVD- Coronary, carotid and aortic arch plaque- Zetia for now, will discuss aspirin on next visit. \par \par HTN- She is open to taking an ACE and would be willing to try ramipril again. She would also take benicar if cost were not an issue. Will discuss with Dr. Ivan gibsonxt options for BP, but she does appear to require better BP control.  \par \par CKD- She never tried farxiga and cost was also an issue, but she would also consider if that could be overcome. \par \par HLD- Continue zetia. Repeat lipids in 2 months. \par

## 2021-11-09 NOTE — PHYSICAL EXAM
[Normal] : alert and oriented, normal memory [Normal S1, S2] : normal S1, S2 [de-identified] : II/VI EMERSON MCGUIRE

## 2021-11-11 ENCOUNTER — APPOINTMENT (OUTPATIENT)
Dept: NEPHROLOGY | Facility: CLINIC | Age: 81
End: 2021-11-11
Payer: MEDICARE

## 2021-11-11 PROCEDURE — 99443: CPT | Mod: 95

## 2021-11-11 NOTE — REASON FOR VISIT
[Home] : at home, [unfilled] , at the time of the visit. [Medical Office: ([facility])___] : at the medical office located in  [Verbal consent obtained from patient] : the patient, [unfilled] [Follow-Up] : a follow-up visit [FreeTextEntry1] : for CKD 4, HTN, gout, HTN proteinuria hematuria

## 2021-11-11 NOTE — ASSESSMENT
[FreeTextEntry1] : rediscussed labs from 9/30/2021\par 81 yo woman with CKD 4, Gout, HTN, HLD, Hypothyroidism, obesity, arthritis, multiple drug allergies  \par Ongoing gout attacks-\par encouraged increase water intake\par will dc HCTZ\par UA level better, but still with attacks\par will refer to rheumatology\par restart home BP monitoring- suspect that we will need to add another antihypertensive agent\par \par creat stable\par \par HTN \par CKD4\par Gout \par HLD\par Hypothyroidism \par Depression, lethargy \par \par f/u 2/2022\par

## 2021-11-11 NOTE — HISTORY OF PRESENT ILLNESS
[FreeTextEntry1] : 81 yo woman for follow up evaluation of gout attacks, CKD 4, Gout, HTN, HLD, Hypothyroidism, obesity, arthritis, and multiple drug allergies. Reports that she has been having reactions to the probenicid- \par has had about 5 gout attacks- Right knee- had to start prednisone- 10 mg x 3 days then she tapered- then to left foot--> right foot- describes as burning and pain- tylenol relieved the burning but pain persisted- took aleve yesterday. Using a cane to walk, can ride her bike without difficulty; using a brace for her right foot/ankle\par is trying to increase her water consumption\par Also feels that the probenecid is making her tired-- starting taking in the evening and feels that it is helping her sleep.\par Prefer her to start [id number]- she declines over concern about UTIs\par taking Zetia\par \par reminder from prior note:\par ramipril was changed to metoprolol (elevated mast cells) in beginning of August; subsequently developed depressive symptoms- metoprolol dc'd and HCTZ added, initially 25 mg, then reduced to 1/2 tablet daily. depression resolved.\par \par \par \par

## 2021-11-30 ENCOUNTER — APPOINTMENT (OUTPATIENT)
Dept: INTERNAL MEDICINE | Facility: CLINIC | Age: 81
End: 2021-11-30
Payer: MEDICARE

## 2021-11-30 VITALS — WEIGHT: 162 LBS | HEIGHT: 64 IN | BODY MASS INDEX: 27.66 KG/M2

## 2021-11-30 PROCEDURE — 97803 MED NUTRITION INDIV SUBSEQ: CPT

## 2021-12-07 ENCOUNTER — APPOINTMENT (OUTPATIENT)
Dept: INTERNAL MEDICINE | Facility: CLINIC | Age: 81
End: 2021-12-07
Payer: MEDICARE

## 2021-12-07 VITALS — WEIGHT: 166 LBS | BODY MASS INDEX: 28.34 KG/M2 | HEIGHT: 64 IN

## 2021-12-07 PROCEDURE — 97803 MED NUTRITION INDIV SUBSEQ: CPT

## 2021-12-14 ENCOUNTER — APPOINTMENT (OUTPATIENT)
Age: 81
End: 2021-12-14

## 2021-12-15 ENCOUNTER — EMERGENCY (EMERGENCY)
Facility: HOSPITAL | Age: 81
LOS: 1 days | Discharge: HOME | End: 2021-12-15
Attending: EMERGENCY MEDICINE | Admitting: EMERGENCY MEDICINE
Payer: MEDICARE

## 2021-12-15 VITALS
WEIGHT: 160.06 LBS | OXYGEN SATURATION: 95 % | HEART RATE: 73 BPM | TEMPERATURE: 98 F | DIASTOLIC BLOOD PRESSURE: 82 MMHG | SYSTOLIC BLOOD PRESSURE: 181 MMHG | RESPIRATION RATE: 18 BRPM | HEIGHT: 66 IN

## 2021-12-15 DIAGNOSIS — Z90.710 ACQUIRED ABSENCE OF BOTH CERVIX AND UTERUS: Chronic | ICD-10-CM

## 2021-12-15 DIAGNOSIS — Z91.018 ALLERGY TO OTHER FOODS: ICD-10-CM

## 2021-12-15 DIAGNOSIS — Z88.0 ALLERGY STATUS TO PENICILLIN: ICD-10-CM

## 2021-12-15 DIAGNOSIS — R10.9 UNSPECIFIED ABDOMINAL PAIN: ICD-10-CM

## 2021-12-15 DIAGNOSIS — I10 ESSENTIAL (PRIMARY) HYPERTENSION: ICD-10-CM

## 2021-12-15 DIAGNOSIS — Z88.2 ALLERGY STATUS TO SULFONAMIDES: ICD-10-CM

## 2021-12-15 DIAGNOSIS — Z88.1 ALLERGY STATUS TO OTHER ANTIBIOTIC AGENTS STATUS: ICD-10-CM

## 2021-12-15 DIAGNOSIS — Z79.82 LONG TERM (CURRENT) USE OF ASPIRIN: ICD-10-CM

## 2021-12-15 DIAGNOSIS — Z90.710 ACQUIRED ABSENCE OF BOTH CERVIX AND UTERUS: ICD-10-CM

## 2021-12-15 DIAGNOSIS — R10.12 LEFT UPPER QUADRANT PAIN: ICD-10-CM

## 2021-12-15 LAB
ALBUMIN SERPL ELPH-MCNC: 3.9 G/DL — SIGNIFICANT CHANGE UP (ref 3.3–5)
ALP SERPL-CCNC: 66 U/L — SIGNIFICANT CHANGE UP (ref 40–120)
ALT FLD-CCNC: 15 U/L — SIGNIFICANT CHANGE UP (ref 10–45)
ANION GAP SERPL CALC-SCNC: 15 MMOL/L — SIGNIFICANT CHANGE UP (ref 5–17)
APPEARANCE UR: ABNORMAL
AST SERPL-CCNC: 27 U/L — SIGNIFICANT CHANGE UP (ref 10–40)
BACTERIA # UR AUTO: PRESENT /HPF
BASOPHILS # BLD AUTO: 0.03 K/UL — SIGNIFICANT CHANGE UP (ref 0–0.2)
BASOPHILS NFR BLD AUTO: 0.4 % — SIGNIFICANT CHANGE UP (ref 0–2)
BILIRUB SERPL-MCNC: 0.3 MG/DL — SIGNIFICANT CHANGE UP (ref 0.2–1.2)
BILIRUB UR-MCNC: NEGATIVE — SIGNIFICANT CHANGE UP
BUN SERPL-MCNC: 24 MG/DL — HIGH (ref 7–23)
CALCIUM SERPL-MCNC: 10.3 MG/DL — SIGNIFICANT CHANGE UP (ref 8.4–10.5)
CHLORIDE SERPL-SCNC: 103 MMOL/L — SIGNIFICANT CHANGE UP (ref 96–108)
CO2 SERPL-SCNC: 24 MMOL/L — SIGNIFICANT CHANGE UP (ref 22–31)
COLOR SPEC: YELLOW — SIGNIFICANT CHANGE UP
CREAT SERPL-MCNC: 1.57 MG/DL — HIGH (ref 0.5–1.3)
DIFF PNL FLD: NEGATIVE — SIGNIFICANT CHANGE UP
EOSINOPHIL # BLD AUTO: 0.16 K/UL — SIGNIFICANT CHANGE UP (ref 0–0.5)
EOSINOPHIL NFR BLD AUTO: 2.3 % — SIGNIFICANT CHANGE UP (ref 0–6)
EPI CELLS # UR: SIGNIFICANT CHANGE UP /HPF (ref 0–5)
GLUCOSE SERPL-MCNC: 85 MG/DL — SIGNIFICANT CHANGE UP (ref 70–99)
GLUCOSE UR QL: NEGATIVE — SIGNIFICANT CHANGE UP
HCT VFR BLD CALC: 34.4 % — LOW (ref 34.5–45)
HGB BLD-MCNC: 11.4 G/DL — LOW (ref 11.5–15.5)
IMM GRANULOCYTES NFR BLD AUTO: 0.4 % — SIGNIFICANT CHANGE UP (ref 0–1.5)
KETONES UR-MCNC: ABNORMAL MG/DL
LACTATE SERPL-SCNC: 0.8 MMOL/L — SIGNIFICANT CHANGE UP (ref 0.5–2)
LEUKOCYTE ESTERASE UR-ACNC: ABNORMAL
LIDOCAIN IGE QN: 13 U/L — SIGNIFICANT CHANGE UP (ref 7–60)
LYMPHOCYTES # BLD AUTO: 1.53 K/UL — SIGNIFICANT CHANGE UP (ref 1–3.3)
LYMPHOCYTES # BLD AUTO: 21.9 % — SIGNIFICANT CHANGE UP (ref 13–44)
MCHC RBC-ENTMCNC: 31.7 PG — SIGNIFICANT CHANGE UP (ref 27–34)
MCHC RBC-ENTMCNC: 33.1 GM/DL — SIGNIFICANT CHANGE UP (ref 32–36)
MCV RBC AUTO: 95.6 FL — SIGNIFICANT CHANGE UP (ref 80–100)
MONOCYTES # BLD AUTO: 1.04 K/UL — HIGH (ref 0–0.9)
MONOCYTES NFR BLD AUTO: 14.9 % — HIGH (ref 2–14)
NEUTROPHILS # BLD AUTO: 4.19 K/UL — SIGNIFICANT CHANGE UP (ref 1.8–7.4)
NEUTROPHILS NFR BLD AUTO: 60.1 % — SIGNIFICANT CHANGE UP (ref 43–77)
NITRITE UR-MCNC: NEGATIVE — SIGNIFICANT CHANGE UP
NRBC # BLD: 0 /100 WBCS — SIGNIFICANT CHANGE UP (ref 0–0)
PH UR: 6 — SIGNIFICANT CHANGE UP (ref 5–8)
PLATELET # BLD AUTO: 255 K/UL — SIGNIFICANT CHANGE UP (ref 150–400)
POTASSIUM SERPL-MCNC: 3.5 MMOL/L — SIGNIFICANT CHANGE UP (ref 3.5–5.3)
POTASSIUM SERPL-SCNC: 3.5 MMOL/L — SIGNIFICANT CHANGE UP (ref 3.5–5.3)
PROT SERPL-MCNC: 6.6 G/DL — SIGNIFICANT CHANGE UP (ref 6–8.3)
PROT UR-MCNC: ABNORMAL MG/DL
RBC # BLD: 3.6 M/UL — LOW (ref 3.8–5.2)
RBC # FLD: 14.8 % — HIGH (ref 10.3–14.5)
RBC CASTS # UR COMP ASSIST: < 5 /HPF — SIGNIFICANT CHANGE UP
SODIUM SERPL-SCNC: 142 MMOL/L — SIGNIFICANT CHANGE UP (ref 135–145)
SP GR SPEC: 1.02 — SIGNIFICANT CHANGE UP (ref 1–1.03)
UROBILINOGEN FLD QL: 0.2 E.U./DL — SIGNIFICANT CHANGE UP
WBC # BLD: 6.98 K/UL — SIGNIFICANT CHANGE UP (ref 3.8–10.5)
WBC # FLD AUTO: 6.98 K/UL — SIGNIFICANT CHANGE UP (ref 3.8–10.5)
WBC UR QL: ABNORMAL /HPF

## 2021-12-15 PROCEDURE — 99284 EMERGENCY DEPT VISIT MOD MDM: CPT

## 2021-12-15 PROCEDURE — 74176 CT ABD & PELVIS W/O CONTRAST: CPT | Mod: 26,MC

## 2021-12-15 RX ORDER — SODIUM CHLORIDE 9 MG/ML
1000 INJECTION INTRAMUSCULAR; INTRAVENOUS; SUBCUTANEOUS ONCE
Refills: 0 | Status: COMPLETED | OUTPATIENT
Start: 2021-12-15 | End: 2021-12-15

## 2021-12-15 RX ORDER — FAMOTIDINE 10 MG/ML
20 INJECTION INTRAVENOUS ONCE
Refills: 0 | Status: COMPLETED | OUTPATIENT
Start: 2021-12-15 | End: 2021-12-15

## 2021-12-15 RX ORDER — SUCRALFATE 1 G
1 TABLET ORAL ONCE
Refills: 0 | Status: COMPLETED | OUTPATIENT
Start: 2021-12-15 | End: 2021-12-15

## 2021-12-15 RX ORDER — IOHEXOL 300 MG/ML
30 INJECTION, SOLUTION INTRAVENOUS ONCE
Refills: 0 | Status: COMPLETED | OUTPATIENT
Start: 2021-12-15 | End: 2021-12-15

## 2021-12-15 RX ADMIN — IOHEXOL 30 MILLILITER(S): 300 INJECTION, SOLUTION INTRAVENOUS at 20:30

## 2021-12-15 RX ADMIN — SODIUM CHLORIDE 1000 MILLILITER(S): 9 INJECTION INTRAMUSCULAR; INTRAVENOUS; SUBCUTANEOUS at 20:30

## 2021-12-15 NOTE — ED PROVIDER NOTE - PATIENT PORTAL LINK FT
You can access the FollowMyHealth Patient Portal offered by [facility] by registering at the following website: [email or web address]. By joining [facility]’s FollowMyHealth portal, you will also be able to view your health information using other applications (apps) compatible with our system.

## 2021-12-15 NOTE — ED ADULT NURSE NOTE - NSICDXPASTMEDICALHX_GEN_ALL_CORE_FT
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

## 2021-12-15 NOTE — ED ADULT NURSE NOTE - CHPI ED NUR CONTEXT2
unknown Enbrel Pregnancy And Lactation Text: This medication is Pregnancy Category B and is considered safe during pregnancy. It is unknown if this medication is excreted in breast milk.

## 2021-12-15 NOTE — ED PROVIDER NOTE - OBJECTIVE STATEMENT
82 y/o F pt with PMHx of partial bowel obstructions (with difficulty holding food after eating) and surgeries with multiple adhesions in the past presents to ED c/o L abdominal pain since Monday. Pt states she is usually able to handle a liquid diet and increase her diet as tolerated, however she has advanced her diet and now has persistent L abdominal pain prompting ED visit. Pt has regular BM, and denies fever, nausea, vomiting, or any other acute complaints. In ED, she is ao x 3 and not in any distress.

## 2021-12-15 NOTE — ED PROVIDER NOTE - CLINICAL SUMMARY MEDICAL DECISION MAKING FREE TEXT BOX
82 y/o F pt presents to ED with abdominal pain. Plan for labs, UA, CT abd/pelvis r/o acute pathology, and reassess. 82 y/o F pt presents to ED with abdominal pain. Plan for labs, UA, CT abd/pelvis r/o acute pathology, and reassess.  Pt given GI cocktail in ED.  On re-evaluation, pt is AAOx3 and in NAD. Vitals wnl. Pt states she has no urinary symptoms. pt with anaphylaxis to multiple antibiotics. Will wait for cultures prior to starting abx if needed (pt has taken doxycyline in the past with no issue).  Instructions given to follow up with primary physician in 1-2 days, return to ED for worsening condition. pt expresses understanding.

## 2021-12-15 NOTE — ED ADULT TRIAGE NOTE - HEIGHT IN CM
DATE OF PROCEDURE:  03/14/2018

 

PREOPERATIVE DIAGNOSES:

1.  Chronic rhinosinusitis.

2.  Bilateral inferior turbinate hypertrophy.

3.  Nasal obstruction.

 

POSTOPERATIVE DIAGNOSES:

1.  Chronic rhinosinusitis.

2.  Bilateral inferior turbinate hypertrophy.

3.  Nasal obstruction.

 

PROCEDURES:

1.  Bilateral endoscopic sinus surgery, total ethmoidectomies.

2.  Bilateral endoscopic sinus surgery, sphenoidotomies.

3.  Right endoscopic sinus surgery, maxillary antrostomy.

4.  Left endoscopic frontal sinusotomy.

5.  Left endoscopic resection of jesus bullosa.

6.  Bilateral inferior turbinate submucous resection.

 

SURGEON:  Constantin Del Real M.D.

 

ESTIMATED BLOOD LOSS:  20 mL.

 

COMPLICATIONS:  None.

 

PROCEDURE IN DETAIL:  The patient was taken to the operating room and placed supine on the table.  Ge
neral endotracheal anesthesia was obtained by the Anesthesia staff.  Tube was secured in the left low
er lip.  The patient was placed in the beach chair position.  Afrin pledgets were placed in the nasal
 cavity.  This patient was prepped and draped for standard nasal procedure.  Following this, the 0-de
gree scope was advanced into the nasal cavity.  1% lidocaine with 1:100,000 epinephrine was injected 
in the inferior turbinates and middle turbinates bilaterally.  Following this, the left middle turbin
ate was visualized using the 0-degree scope and a sickle knife was used to make a vertical incision. 
 The lateral portion of the middle turbinate was then resected using the straight Blakesley forceps a
nd microdebrider.  Following this, the uncinate process was identified on the right side and was rese
cted using the ball-ended probe and the upbiting Blakesley forceps.  Following this, the ethmoidal bu
lla was then punctured on its medial and inferior aspect and was removed using the microdebrider.  Fo
llowing this, the grand lamella was identified and was punctured in the posterior ethmoidal cells and
 was resected.  The sphenoid sinus ostia were then identified through the previous ethmoidectomies an
d sphenoidotomies were created bilaterally and were widened medially and inferiorly on the left side.
  Copious amounts of purulence and bacteria products were encountered and were irrigated.  Following 
this, the right maxillary sinus ostium was identified and was gently probed using the ball-ended prob
e.  Curved suction was then used to gently widen this area along with the curved microdebrider.  Foll
owing this, the 45-degree scope was used to visualize the left frontal recess area and frontal sinus 
ostium.  The curved microdebrider was used to further resect the frontal sinus ostium on this left si
de and widen the frontal sinus.  Following this, inferior turbinates were punctured on the anterior i
nferior aspect and were submucosally resected using the submucosal microdebrider.  Following this, th
e nasal cavity was irrigated.  Mirapex were placed.  The patient tolerated the procedure well.
167.64

## 2021-12-15 NOTE — ED ADULT TRIAGE NOTE - CHIEF COMPLAINT QUOTE
Pt presents to ED c/o abdominal pain x 4 days. States "Sunday night I was vomiting all night and now today im still having a lot of abdominal pain, I feel like my intestines are inflamed". denies cp, sob, fevers.

## 2021-12-15 NOTE — ED ADULT NURSE NOTE - OBJECTIVE STATEMENT
Pt presents to ED C/O abdominal pain and bloating with nausea/ vomiting. Pt states, " I've had bowel obstructions in the past and the pain I am feeling feels similar". Hx of CA, kidney disease.

## 2021-12-16 VITALS
TEMPERATURE: 98 F | SYSTOLIC BLOOD PRESSURE: 170 MMHG | HEART RATE: 58 BPM | OXYGEN SATURATION: 97 % | RESPIRATION RATE: 17 BRPM | DIASTOLIC BLOOD PRESSURE: 77 MMHG

## 2021-12-16 PROCEDURE — 83605 ASSAY OF LACTIC ACID: CPT

## 2021-12-16 PROCEDURE — 96374 THER/PROPH/DIAG INJ IV PUSH: CPT

## 2021-12-16 PROCEDURE — 99284 EMERGENCY DEPT VISIT MOD MDM: CPT | Mod: 25

## 2021-12-16 PROCEDURE — 83690 ASSAY OF LIPASE: CPT

## 2021-12-16 PROCEDURE — 80053 COMPREHEN METABOLIC PANEL: CPT

## 2021-12-16 PROCEDURE — 85025 COMPLETE CBC W/AUTO DIFF WBC: CPT

## 2021-12-16 PROCEDURE — 74176 CT ABD & PELVIS W/O CONTRAST: CPT | Mod: MC

## 2021-12-16 PROCEDURE — 87184 SC STD DISK METHOD PER PLATE: CPT

## 2021-12-16 PROCEDURE — 87086 URINE CULTURE/COLONY COUNT: CPT

## 2021-12-16 PROCEDURE — 81001 URINALYSIS AUTO W/SCOPE: CPT

## 2021-12-16 PROCEDURE — 36415 COLL VENOUS BLD VENIPUNCTURE: CPT

## 2021-12-16 RX ADMIN — Medication 1 GRAM(S): at 00:11

## 2021-12-16 RX ADMIN — FAMOTIDINE 20 MILLIGRAM(S): 10 INJECTION INTRAVENOUS at 00:11

## 2021-12-17 LAB
-  CLINDAMYCIN: SIGNIFICANT CHANGE UP
-  ERYTHROMYCIN: SIGNIFICANT CHANGE UP
-  LEVOFLOXACIN: SIGNIFICANT CHANGE UP
-  PENICILLIN: SIGNIFICANT CHANGE UP
-  VANCOMYCIN: SIGNIFICANT CHANGE UP
CULTURE RESULTS: SIGNIFICANT CHANGE UP
METHOD TYPE: SIGNIFICANT CHANGE UP
ORGANISM # SPEC MICROSCOPIC CNT: SIGNIFICANT CHANGE UP
ORGANISM # SPEC MICROSCOPIC CNT: SIGNIFICANT CHANGE UP
SPECIMEN SOURCE: SIGNIFICANT CHANGE UP

## 2021-12-21 ENCOUNTER — APPOINTMENT (OUTPATIENT)
Dept: INTERNAL MEDICINE | Facility: CLINIC | Age: 81
End: 2021-12-21
Payer: MEDICARE

## 2021-12-21 VITALS — BODY MASS INDEX: 27.31 KG/M2 | HEIGHT: 64 IN | WEIGHT: 160 LBS

## 2021-12-21 PROCEDURE — 97803 MED NUTRITION INDIV SUBSEQ: CPT

## 2021-12-30 ENCOUNTER — APPOINTMENT (OUTPATIENT)
Dept: RHEUMATOLOGY | Facility: CLINIC | Age: 81
End: 2021-12-30
Payer: MEDICARE

## 2021-12-30 VITALS
HEART RATE: 76 BPM | WEIGHT: 161 LBS | BODY MASS INDEX: 27.49 KG/M2 | OXYGEN SATURATION: 98 % | DIASTOLIC BLOOD PRESSURE: 84 MMHG | HEIGHT: 64 IN | TEMPERATURE: 98.3 F | RESPIRATION RATE: 14 BRPM | SYSTOLIC BLOOD PRESSURE: 163 MMHG

## 2021-12-30 PROCEDURE — 99205 OFFICE O/P NEW HI 60 MIN: CPT | Mod: 25

## 2021-12-30 PROCEDURE — 36415 COLL VENOUS BLD VENIPUNCTURE: CPT

## 2022-01-03 NOTE — HISTORY OF PRESENT ILLNESS
[Fatigue] : fatigue [Chest Pain] : chest pain [Arthralgias] : arthralgias [Joint Swelling] : joint swelling [Joint Warmth] : joint warmth [Decreased ROM] : decreased range of motion [Morning Stiffness] : morning stiffness [Difficulty Walking] : difficulty walking [Dry Eyes] : dry eyes [FreeTextEntry1] : Referred by Dr. Love  for Rheumatology consultation \par \par 81 yo woman with hx of HTN, CKD 4, proteinuria, HKD, hypothyroidism,  HLD on Zetia, did not tolerate statin due to myalgia,  hx of peritoneal mesothelioma 1999 , s/p surgery, chemo and in remission sine treatment. \par She has osteoporosis and not on any treatment, last DEXA 08/21, hx of ? fragility fracture \par had gout attack first time number of years ago, started with great toe arthritis. \par Saw Rheum Dr. Lee once in the past \par Gout mainly managed by Nephrologist \par she has multiple drug allergies and anaphylaxis reaction. \par anaphylaxis on allopurinol \par tried colchicine and uloric allergic reaction. \par On  probenicid 500mg daily since August 2021 \par 7 gout attack since Probenecid started and now involved different joints including LE and UE joints,elbows, knees.\par Attack present with acute pain and swelling, joint is hot and tender,  treated flare with  prednisone 15mg X2 days and then tapers down \par Last uric acid 6.5 \par R knee arthroscopic surgery. \par Using a cane to walk, can ride her bike without difficulty; using a brace for her right foot/ankle\par Also feels that the probenecid is making her gout flare worse and feels more fatigue since it started.\par She is well aware about gout provioking food and alcoho. \par \par  [Anorexia] : no anorexia [Weight Loss] : no weight loss [Malaise] : no malaise [Fever] : no fever [Chills] : no chills [Depression] : no depression [Malar Facial Rash] : no malar facial rash [Skin Lesions] : no lesions [Skin Nodules] : no skin nodules [Oral Ulcers] : no oral ulcers [Cough] : no cough [Dry Mouth] : no dry mouth [Dysphagia] : no dysphagia [Shortness of Breath] : no shortness of breath [Joint Deformity] : no joint deformity [Falls] : no falls [Difficulty Standing] : no difficulty standing [Dyspnea] : no dyspnea [Myalgias] : no myalgias [Muscle Weakness] : no muscle weakness [Muscle Spasms] : no muscle spasms [Muscle Cramping] : no muscle cramping [Visual Changes] : no visual changes [Eye Pain] : no eye pain [Eye Redness] : no eye redness

## 2022-01-03 NOTE — REVIEW OF SYSTEMS
[As Noted in HPI] : as noted in HPI [Arthralgias] : arthralgias [Joint Pain] : joint pain [Joint Swelling] : joint swelling [Joint Stiffness] : joint stiffness [Limb Swelling] : no limb swelling [Skin Lesions] : no skin lesions [Itching] : no itching [Muscle Weakness] : no muscle weakness [Easy Bleeding] : no tendency for easy bleeding [Easy Bruising] : no tendency for easy bruising [Swollen Glands] : no swollen glands [Swollen Glands In The Neck] : no swollen glands in the neck

## 2022-01-03 NOTE — ASSESSMENT
[FreeTextEntry1] : 80 y/o F HTN, CKD 4, proteinuria, HKD, hypothyroidism,  HLD on Zetia, did not tolerate statin due to myalgia. \par Has multiple drug allergies and anaphylaxis \par She has osteoporosis and not on any treatment, last DEXA 08/21, hx of ? fragility fracture \par had gout attack first time number of years ago, severe  allergic reactions to allopurinol ( anaphylaxis) and Uloric and colchicine allergy. \par Has non tophaceous and refractory gout, with recurrent multiple flares since probenecid started round Aug, 2021  but has refractory gout and her uric acid is not at target. Flares has been treated with steroid taper course. \par \par \par 1. Refractory gout: \par Given her underlying medical problems, allergy hx of CKD make treatment of gout very challenging \par unfortunately there is not many options left to try and few things maybe done such as low dose prednisone which is not preferred in the setting of osteoporosis. prophylaxis with NSAIDs not possible in advanced CKD and CV problems. \par \par She can have desensitization to allopurinol or Uloric if possible and lastly can try Pegloticase with premedication of IV steroids, benadryl and tylenol as risk of anaphylaxis and infusion reactions have been reported during and after administration. Delayed hypersensitivity reaction also reported. \par If she agrees need to have G6PD enzyme checked \par Need treatment every 2 weeks and strict monitoring of uric acid prior to every dose. \par \par Will check additional rheum labs to rule out other inflammatory arthritis, Xrays to watch for gouty erosions or periarticular tophi as if any tophi found she will need tight uric acid target <5. \par \par \par \par 2. Osteoporosis: \par check OP labs and she should be treated with prolia as due to CKD 4 not a candidate for bisphosphonate therapy. \par Hyper para likely 2/2 CKD \par \par Labs done during the visit today \par \par

## 2022-01-03 NOTE — PHYSICAL EXAM
[General Appearance - Alert] : alert [General Appearance - In No Acute Distress] : in no acute distress [Sclera] : the sclera and conjunctiva were normal [Outer Ear] : the ears and nose were normal in appearance [Examination Of The Oral Cavity] : the lips and gums were normal [Oropharynx] : the oropharynx was normal [Neck Appearance] : the appearance of the neck was normal [Respiration, Rhythm And Depth] : normal respiratory rhythm and effort [Auscultation Breath Sounds / Voice Sounds] : lungs were clear to auscultation bilaterally [Heart Rate And Rhythm] : heart rate was normal and rhythm regular [Heart Sounds] : normal S1 and S2 [Abdomen Soft] : soft [No Spinal Tenderness] : no spinal tenderness [Abnormal Walk] : normal gait [Nail Clubbing] : no clubbing  or cyanosis of the fingernails [Musculoskeletal - Swelling] : no joint swelling seen [Motor Tone] : muscle strength and tone were normal [] : no rash [Skin Lesions] : no skin lesions [Motor Exam] : the motor exam was normal [Oriented To Time, Place, And Person] : oriented to person, place, and time [Impaired Insight] : insight and judgment were intact [FreeTextEntry1] : NO tophi

## 2022-01-05 ENCOUNTER — NON-APPOINTMENT (OUTPATIENT)
Age: 82
End: 2022-01-05

## 2022-01-10 ENCOUNTER — NON-APPOINTMENT (OUTPATIENT)
Age: 82
End: 2022-01-10

## 2022-01-10 ENCOUNTER — APPOINTMENT (OUTPATIENT)
Dept: HEART AND VASCULAR | Facility: CLINIC | Age: 82
End: 2022-01-10
Payer: MEDICARE

## 2022-01-10 ENCOUNTER — APPOINTMENT (OUTPATIENT)
Dept: INTERNAL MEDICINE | Facility: CLINIC | Age: 82
End: 2022-01-10
Payer: MEDICARE

## 2022-01-10 VITALS
TEMPERATURE: 97.2 F | HEIGHT: 64 IN | BODY MASS INDEX: 27.49 KG/M2 | WEIGHT: 161 LBS | SYSTOLIC BLOOD PRESSURE: 164 MMHG | DIASTOLIC BLOOD PRESSURE: 80 MMHG | HEART RATE: 73 BPM | OXYGEN SATURATION: 96 %

## 2022-01-10 VITALS
DIASTOLIC BLOOD PRESSURE: 68 MMHG | SYSTOLIC BLOOD PRESSURE: 164 MMHG | OXYGEN SATURATION: 97 % | WEIGHT: 158 LBS | BODY MASS INDEX: 27.12 KG/M2

## 2022-01-10 PROCEDURE — 99213 OFFICE O/P EST LOW 20 MIN: CPT

## 2022-01-10 PROCEDURE — 93000 ELECTROCARDIOGRAM COMPLETE: CPT

## 2022-01-10 PROCEDURE — 99215 OFFICE O/P EST HI 40 MIN: CPT

## 2022-01-10 RX ORDER — PREDNISONE 10 MG/1
10 TABLET ORAL
Qty: 60 | Refills: 1 | Status: DISCONTINUED | COMMUNITY
Start: 2021-11-11 | End: 2022-01-10

## 2022-01-10 RX ORDER — HYDROCHLOROTHIAZIDE 12.5 MG/1
12.5 TABLET ORAL DAILY
Qty: 90 | Refills: 3 | Status: DISCONTINUED | COMMUNITY
Start: 2021-08-23 | End: 2022-01-10

## 2022-01-10 NOTE — PHYSICAL EXAM
[Well Developed] : well developed [Well Nourished] : well nourished [No Acute Distress] : no acute distress [Normal Venous Pressure] : normal venous pressure [Normal S1, S2] : normal S1, S2 [No Murmur] : no murmur [Clear Lung Fields] : clear lung fields [Good Air Entry] : good air entry [No Respiratory Distress] : no respiratory distress  [Soft] : abdomen soft [Non Tender] : non-tender [Normal Bowel Sounds] : normal bowel sounds [Normal Gait] : normal gait [No Edema] : no edema [No Cyanosis] : no cyanosis [No Rash] : no rash [No Skin Lesions] : no skin lesions [Moves all extremities] : moves all extremities [No Focal Deficits] : no focal deficits [Normal Speech] : normal speech [Alert and Oriented] : alert and oriented [Normal memory] : normal memory

## 2022-01-10 NOTE — HISTORY OF PRESENT ILLNESS
[FreeTextEntry1] : follow up [de-identified] : HLD\par - not at goal w/ zetia\par - nexletol was added today by cardio\par \par mesothelioma of abdo\par - concerned about screening recommendations from onc\par - was told to come back after 83yo which is 4yrs from her last CT\par - previously it was every 1.5-2yrs\par \par back pain/spasm\par - started acutely 3 days ago\par - right low back\par - radiated to groin\par - used hot compresses and Aleve\par - developed left ring finger gout flare, which improved w/ aleve

## 2022-01-12 NOTE — HISTORY OF PRESENT ILLNESS
[FreeTextEntry1] : 80 F presents for follow-up of cardiac murmur, hypertension, dilated aortic root, coronary artery calcification on CT chest. \par \par Zetia started in 11/2021\par In 1/2021: Total Cholesterol 206, , HDL 86, TGL 55\par \par She otherwise feels well. BP remains elevated. \par She was on benicar then switched to olmesartan which she didn't tolerated (wasn’t feeling well, had elevated blood pressure while on it). She was switched to rampril at which point elevated serum protein was found which was believed to be due to the medications. She was worked up including a bone marrow biopsy which was unrevealing. IT was switched to metoprolol which caused depression. Then switched to HCTZ. This was recently stopped due to worsening kidney function. 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 Has occasional anxiety, no symptoms of JOHN. menopause at 53. No pregnancy complications.\par \par ===WORKUP===\par \par MR angio chest 10-\par Cardiac: Normal pericardiam, normal chamber size. Trileaflet aortic valve. Trace AR, MARILYN 3.5cm. Normal calibre aorta with annulus 22mm, sinus of valsalva 34mm, ascending aorta 37mm, arch 30mm\par PA: normal caliber. \par \par 10/8/2021- Echo- moderately dilated LA, mild concentric LVH, nl LV szie and fx, aortic arch plaque\par CT chest 2/20: Dilated aortic root 4.1cm. Incidental Coronary artery calcification.\par \par Lipids: , Tg 71, Tchol 223, N \par \par CV risk factors:\par Coronary calcium\par HLD\par CKD (GFR 30)\par Proteinuria\par HTN, uncontrolled\par

## 2022-01-12 NOTE — DISCUSSION/SUMMARY
[FreeTextEntry1] : 80 F presents for follow-up of cardiac murmur, hypertension, coronary artery calcification on CT chest. \par \par # ASCVD - Coronary, carotid and aortic arch plaque- \par - Zetia started in 11/2021.  in 1/2022. \par - Does not tolerated statins. Severe muscle aches and brain fog \par - May consider bempedoid acid \par - Will discuss adding aspirin \par - BP remains uncontrolled. Will reconsider ramipril. (On Medscape: minor interaction between ramipril and probenecid. Probenecid may increase the effects of ramipril.) Also confirmed with our pharmacists, no contraindication or dose adjustment needed with patients with CKD and on probenecid. \par \par # Aortic root dilatation - Dimensions normal on MRI. Can monitor with echo \par \par # CKD\par - She never tried farxiga and cost was also an issue, but she would also consider if that could be overcome.

## 2022-01-13 ENCOUNTER — TRANSCRIPTION ENCOUNTER (OUTPATIENT)
Age: 82
End: 2022-01-13

## 2022-01-26 ENCOUNTER — TRANSCRIPTION ENCOUNTER (OUTPATIENT)
Age: 82
End: 2022-01-26

## 2022-01-26 LAB
25(OH)D3 SERPL-MCNC: 33.5 NG/ML
CALCIUM SERPL-MCNC: 10.6 MG/DL
CCP AB SER IA-ACNC: <8 UNITS
CRP SERPL-MCNC: <3 MG/L
ENA SS-A AB SER IA-ACNC: <0.2 AL
ENA SS-B AB SER IA-ACNC: <0.2 AL
ERYTHROCYTE [SEDIMENTATION RATE] IN BLOOD BY WESTERGREN METHOD: 29 MM/HR
PARATHYROID HORMONE INTACT: 114 PG/ML
RF+CCP IGG SER-IMP: NEGATIVE
RHEUMATOID FACT SER QL: 34 IU/ML
URATE SERPL-MCNC: 6.2 MG/DL

## 2022-01-26 NOTE — ED ADULT NURSE NOTE - CAS TRG GEN SKIN CONDITION
Dr. Breezy Jimenez at the bedside addressing Rosetta's concerns for the patient. Educated patient on the bacterial infection and how we are treating it. Also educated on the antibiotics being used. Dry/Warm

## 2022-02-02 LAB
ALBUMIN SERPL ELPH-MCNC: 4.1 G/DL
ANION GAP SERPL CALC-SCNC: 14 MMOL/L
APPEARANCE: CLEAR
BACTERIA: NEGATIVE
BASOPHILS # BLD AUTO: 0.03 K/UL
BASOPHILS NFR BLD AUTO: 0.3 %
BILIRUBIN URINE: NEGATIVE
BLOOD URINE: NEGATIVE
BUN SERPL-MCNC: 31 MG/DL
CALCIUM SERPL-MCNC: 10.5 MG/DL
CALCIUM SERPL-MCNC: 10.5 MG/DL
CHLORIDE SERPL-SCNC: 105 MMOL/L
CO2 SERPL-SCNC: 23 MMOL/L
COLOR: YELLOW
CREAT SERPL-MCNC: 1.41 MG/DL
CYSTATIN C SERPL-MCNC: 1.91 MG/L
EOSINOPHIL # BLD AUTO: 0.05 K/UL
EOSINOPHIL NFR BLD AUTO: 0.6 %
GFR/BSA.PRED SERPLBLD CYS-BASED-ARV: 29 ML/MIN/1.73M2
GLUCOSE QUALITATIVE U: NEGATIVE
GLUCOSE SERPL-MCNC: 97 MG/DL
HCT VFR BLD CALC: 36.1 %
HGB BLD-MCNC: 11.5 G/DL
HYALINE CASTS: 0 /LPF
IMM GRANULOCYTES NFR BLD AUTO: 0.5 %
KETONES URINE: NEGATIVE
LEUKOCYTE ESTERASE URINE: ABNORMAL
LYMPHOCYTES # BLD AUTO: 1.35 K/UL
LYMPHOCYTES NFR BLD AUTO: 15.6 %
MAN DIFF?: NORMAL
MCHC RBC-ENTMCNC: 30.8 PG
MCHC RBC-ENTMCNC: 31.9 GM/DL
MCV RBC AUTO: 96.8 FL
MICROSCOPIC-UA: NORMAL
MONOCYTES # BLD AUTO: 0.88 K/UL
MONOCYTES NFR BLD AUTO: 10.2 %
NEUTROPHILS # BLD AUTO: 6.3 K/UL
NEUTROPHILS NFR BLD AUTO: 72.8 %
NITRITE URINE: NEGATIVE
PARATHYROID HORMONE INTACT: 116 PG/ML
PH URINE: 6
PHOSPHATE SERPL-MCNC: 3.3 MG/DL
PLATELET # BLD AUTO: 276 K/UL
POTASSIUM SERPL-SCNC: 3.9 MMOL/L
PROTEIN URINE: ABNORMAL
RBC # BLD: 3.73 M/UL
RBC # FLD: 15.2 %
RED BLOOD CELLS URINE: 1 /HPF
SODIUM SERPL-SCNC: 141 MMOL/L
SPECIFIC GRAVITY URINE: 1.02
SQUAMOUS EPITHELIAL CELLS: 3 /HPF
UROBILINOGEN URINE: NORMAL
WBC # FLD AUTO: 8.65 K/UL
WHITE BLOOD CELLS URINE: 9 /HPF

## 2022-02-04 ENCOUNTER — APPOINTMENT (OUTPATIENT)
Dept: GASTROENTEROLOGY | Facility: CLINIC | Age: 82
End: 2022-02-04
Payer: MEDICARE

## 2022-02-04 VITALS
DIASTOLIC BLOOD PRESSURE: 78 MMHG | HEART RATE: 82 BPM | WEIGHT: 165 LBS | RESPIRATION RATE: 16 BRPM | HEIGHT: 64 IN | SYSTOLIC BLOOD PRESSURE: 126 MMHG | BODY MASS INDEX: 28.17 KG/M2 | OXYGEN SATURATION: 95 % | TEMPERATURE: 97.7 F

## 2022-02-04 DIAGNOSIS — Z86.010 PERSONAL HISTORY OF COLONIC POLYPS: ICD-10-CM

## 2022-02-04 DIAGNOSIS — K21.9 GASTRO-ESOPHAGEAL REFLUX DISEASE W/OUT ESOPHAGITIS: ICD-10-CM

## 2022-02-04 PROCEDURE — 99203 OFFICE O/P NEW LOW 30 MIN: CPT

## 2022-02-04 RX ORDER — MUPIROCIN 20 MG/G
2 OINTMENT TOPICAL
Qty: 44 | Refills: 0 | Status: DISCONTINUED | COMMUNITY
Start: 2021-05-11 | End: 2022-02-04

## 2022-02-04 NOTE — ASSESSMENT
[FreeTextEntry1] : 80 yo female with a hx of peritoneal mesothelioma with reflux, hx of colon polyps.\par \par - Continue Prevacid 15 mg PO daily\par - F/u in office in August/September to  discuss possibly performing an EGD and a colonoscopy in the fall

## 2022-02-04 NOTE — HISTORY OF PRESENT ILLNESS
[FreeTextEntry1] : 82 yo female with reflux, hx of colon polyps.  Pt also with a hx of peritoneal mesothelioma of the abdomen treated with surgery and cisplatin.   Also a hx of SBO's that resolve on won.  Will have abdominal bloating, more prone to symptoms from her abdominal surgery.  Will slowly advance her diet.  Will lose 6-8 lbs during these episodes.  [place] [facility] [place].  Also a hx of microperforation in intestines -- took abx and did well.  No BRBPR, no dark stools.  No N/V/D, no constipation.  + reflux -- takes Prevacid daily, concerned about taking it daily.  If stops Prevacid will have symptoms.  Gained weight on prednisone for gout.  Took cisplatin for peritoneal mesothelioma.  \par \par 2/27/20 CT chest showed new ill-defined ground glass nodularity in right upper and left lower lobes, mildly dilated ascending thoracic aorta measuring up to 4.1 cm. \par \par 2/27/20 CT abdomen showed no evidence of abdominopelvic metastatic disease.  \par \par 2/28/20 CT neck showed no significant interval changes\par \par Colonoscopy -- 3 years ago -- polyps found and removed, due for repeat this fall\par EGD -- 3 years ago -- cannot remember results, due for a repeat this fall\par \par Adopted\par \par Covid Vaccinated -- Pfizer x 3\par \par Retired,

## 2022-02-07 ENCOUNTER — APPOINTMENT (OUTPATIENT)
Dept: NEPHROLOGY | Facility: CLINIC | Age: 82
End: 2022-02-07
Payer: MEDICARE

## 2022-02-07 VITALS — OXYGEN SATURATION: 97 % | HEART RATE: 71 BPM | DIASTOLIC BLOOD PRESSURE: 74 MMHG | SYSTOLIC BLOOD PRESSURE: 144 MMHG

## 2022-02-07 PROCEDURE — 99214 OFFICE O/P EST MOD 30 MIN: CPT

## 2022-02-09 NOTE — ASSESSMENT
[FreeTextEntry1] : all lab data was reviewed with patient in detail from 2/1/2022\par 82 yo woman with CKD 4, Gout, HTN, HLD, Hypothyroidism, obesity, arthritis, multiple drug allergies  \par -gout- okay dc probenicid-  feels it is not helping, however explained to her that her UA level down. Advised her to f/u with rheum- can RA be playing a role here\par -HTN- prior home BPs okay, a bit above goal now in office- instructed to restart home BP monitoring. We can restart ramipril if necessary- intial dc was concern for crossover allergic rxn when introducing probenecid per allergy-\par -CKD 4- creat stable \par HLD\par Hypothyroidism \par Depression, lethargy \par \par f/u 4 months\par

## 2022-02-09 NOTE — HISTORY OF PRESENT ILLNESS
[FreeTextEntry1] : 80 yo woman with gout attacks, CKD 4, Gout, HTN, HLD, Hypothyroidism, obesity, arthritis, and multiple drug allergies, here for follow up evaluation.\par using  probenicid- not sure if it is helping- still needs prednisone to manage- about 11 gout attacks since summer 2021\par had been off HCTZ since about Nov.\par right foot gout attack 2/5- took prednisone 20 mg yesterday- feels much better\par did test + rheumatoid factor-\par Low back pain, from ? arthritis- going to PT- helpful- \par Prefer her to start [id number]- she declines over concern about UTIs\par \par \par reminder from prior note:\par ramipril was changed to metoprolol (elevated mast cells) in beginning of August; subsequently developed depressive symptoms- metoprolol dc'd and HCTZ added, initially 25 mg, then reduced to 1/2 tablet daily. depression resolved.\par \par \par \par

## 2022-02-09 NOTE — PHYSICAL EXAM
[General Appearance - Alert] : alert [General Appearance - In No Acute Distress] : in no acute distress [] : no respiratory distress [Auscultation Breath Sounds / Voice Sounds] : lungs were clear to auscultation bilaterally [Heart Rate And Rhythm] : heart rate was normal and rhythm regular [Heart Sounds] : normal S1 and S2 [Heart Sounds Gallop] : no gallops [Murmurs] : no murmurs [Heart Sounds Pericardial Friction Rub] : no pericardial rub [Edema] : there was no peripheral edema [No CVA Tenderness] : no ~M costovertebral angle tenderness [Musculoskeletal - Swelling] : no joint swelling seen [FreeTextEntry1] : n

## 2022-02-10 ENCOUNTER — APPOINTMENT (OUTPATIENT)
Dept: RHEUMATOLOGY | Facility: CLINIC | Age: 82
End: 2022-02-10
Payer: MEDICARE

## 2022-02-10 VITALS
SYSTOLIC BLOOD PRESSURE: 135 MMHG | HEART RATE: 77 BPM | WEIGHT: 163 LBS | OXYGEN SATURATION: 98 % | BODY MASS INDEX: 27.83 KG/M2 | HEIGHT: 64 IN | TEMPERATURE: 97.8 F | DIASTOLIC BLOOD PRESSURE: 80 MMHG

## 2022-02-10 DIAGNOSIS — M79.671 PAIN IN RIGHT FOOT: ICD-10-CM

## 2022-02-10 PROCEDURE — 99214 OFFICE O/P EST MOD 30 MIN: CPT

## 2022-02-12 PROBLEM — M79.671 RIGHT FOOT PAIN: Status: ACTIVE | Noted: 2022-02-10

## 2022-02-12 NOTE — PHYSICAL EXAM
[General Appearance - Alert] : alert [General Appearance - In No Acute Distress] : in no acute distress [Sclera] : the sclera and conjunctiva were normal [Outer Ear] : the ears and nose were normal in appearance [Neck Appearance] : the appearance of the neck was normal [] : no respiratory distress [Respiration, Rhythm And Depth] : normal respiratory rhythm and effort [Heart Rate And Rhythm] : heart rate was normal and rhythm regular [No Spinal Tenderness] : no spinal tenderness [Abnormal Walk] : normal gait [Nail Clubbing] : no clubbing  or cyanosis of the fingernails [Musculoskeletal - Swelling] : no joint swelling seen [Motor Tone] : muscle strength and tone were normal [Oriented To Time, Place, And Person] : oriented to person, place, and time [Impaired Insight] : insight and judgment were intact [FreeTextEntry1] : NO tophi

## 2022-02-12 NOTE — ASSESSMENT
[FreeTextEntry1] : 80 y/o F HTN, CKD 4, proteinuria, HKD, hypothyroidism,  HLD on Zetia, did not tolerate statin due to myalgia. \par Has multiple drug allergies and anaphylaxis \par She has osteoporosis and not on any treatment, last DEXA 08/21, hx of ? fragility fracture \par had gout attack first time number of years ago, severe  allergic reactions to allopurinol ( anaphylaxis)  and colchicine allergy. not sure about  Uloric\par Has non tophaceous and refractory gout, with recurrent multiple flares since probenecid started round Aug, 2021  also uric acid is not at target. Flares has been treated with steroid taper course. \par Now she is off probenecid as of this month and gets steroids for active flare. 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 Refractory gout: no tophi and no erosions seen on xrays \par she has been declining any gout treatment at present, I have asked to investigate if she ever tried uloric and if any allergic reaction happened. \par Pt prefers to stay off any treatment for now and watch flares. \par Given her underlying medical problems, allergy hx of CKD make treatment of gout very challenging \par unfortunately there is not many options left to try and few things maybe done such as low dose prednisone which is not preferred in the setting of osteoporosis. prophylaxis with NSAIDs not possible in advanced CKD and CV problems. \par \par Alternatively  can try Pegloticase with premedication of IV steroids, benadryl and tylenol as risk of anaphylaxis and infusion reactions have been reported during and after administration. Delayed hypersensitivity reaction also reported: \par If she agrees need to have G6PD enzyme checked \par Need treatment every 2 weeks and strict monitoring of uric acid prior to every dose.\par \par \par 2. Osteoporosis: she has been declining prolia ( it's a only safe choice in CKD)  not a candidate for bisphosphonate therapy. \par Hyper para likely 2/2 CKD \par \par

## 2022-02-12 NOTE — HISTORY OF PRESENT ILLNESS
[Fatigue] : fatigue [Chest Pain] : chest pain [Arthralgias] : arthralgias [Joint Swelling] : joint swelling [Joint Warmth] : joint warmth [Decreased ROM] : decreased range of motion [Morning Stiffness] : morning stiffness [Difficulty Walking] : difficulty walking [Dry Eyes] : dry eyes [FreeTextEntry1] : Follow up: 2/11/22 \par 80 y/o F with treatment refractory gout and CKD ( also hx of multiple gout med allergy) \par had gout attack first time number of years ago, severe  allergic reactions to allopurinol ( anaphylaxis)  and colchicine allergy. She does not remember if ever tried  Uloric. \par She has been experiencing  multiple flares since probenecid started round Aug, 2021, requiring treatment with steroids intermittently. \par \par since last visit pt stopped Probenecid. Has R foot and ankle pain and swelling, takes prednisone, on taper \par Last uric acid 6.2 in Dec, 30, 2021 \par Noted elevated RF 34 \par \par \par \par Referred by Dr. Love  for Rheumatology consultation \par \par 81 yo woman with hx of HTN, CKD 4, proteinuria, HKD, hypothyroidism,  HLD on Zetia, did not tolerate statin due to myalgia,  hx of peritoneal mesothelioma 1999 , s/p surgery, chemo and in remission sine treatment. \par She has osteoporosis and not on any treatment, last DEXA 08/21, hx of ? fragility fracture \par had gout attack first time number of years ago, started with great toe arthritis. \par Saw Rheum Dr. Lee once in the past \par Gout mainly managed by Nephrologist \par she has multiple drug allergies and anaphylaxis reaction. \par anaphylaxis on allopurinol \par tried colchicine and uloric allergic reaction. \par On  probenicid 500mg daily since August 2021 \par 7 gout attack since Probenecid started and now involved different joints including LE and UE joints,elbows, knees.\par Attack present with acute pain and swelling, joint is hot and tender,  treated flare with  prednisone 15mg X2 days and then tapers down \par Last uric acid 6.5 \par R knee arthroscopic surgery. \par Using a cane to walk, can ride her bike without difficulty; using a brace for her right foot/ankle\par Also feels that the probenecid is making her gout flare worse and feels more fatigue since it started.\par She is well aware about gout provioking food and alcoho. \par \par  [Anorexia] : no anorexia [Weight Loss] : no weight loss [Malaise] : no malaise [Fever] : no fever [Chills] : no chills [Depression] : no depression [Malar Facial Rash] : no malar facial rash [Skin Lesions] : no lesions [Skin Nodules] : no skin nodules [Oral Ulcers] : no oral ulcers [Cough] : no cough [Dry Mouth] : no dry mouth [Dysphagia] : no dysphagia [Shortness of Breath] : no shortness of breath [Joint Deformity] : no joint deformity [Falls] : no falls [Difficulty Standing] : no difficulty standing [Dyspnea] : no dyspnea [Myalgias] : no myalgias [Muscle Weakness] : no muscle weakness [Muscle Spasms] : no muscle spasms [Muscle Cramping] : no muscle cramping [Visual Changes] : no visual changes [Eye Pain] : no eye pain [Eye Redness] : no eye redness

## 2022-03-01 ENCOUNTER — APPOINTMENT (OUTPATIENT)
Dept: INTERNAL MEDICINE | Facility: CLINIC | Age: 82
End: 2022-03-01
Payer: MEDICARE

## 2022-03-01 VITALS
HEART RATE: 75 BPM | WEIGHT: 164 LBS | SYSTOLIC BLOOD PRESSURE: 148 MMHG | BODY MASS INDEX: 28 KG/M2 | RESPIRATION RATE: 16 BRPM | OXYGEN SATURATION: 96 % | DIASTOLIC BLOOD PRESSURE: 73 MMHG | TEMPERATURE: 98.1 F | HEIGHT: 64 IN

## 2022-03-01 PROCEDURE — 99214 OFFICE O/P EST MOD 30 MIN: CPT

## 2022-03-01 RX ORDER — PROBENECID 500 MG/1
500 TABLET ORAL
Qty: 90 | Refills: 3 | Status: COMPLETED | COMMUNITY
Start: 2021-07-21 | End: 2022-03-01

## 2022-03-01 NOTE — END OF VISIT
[FreeTextEntry3] : I personally discussed this patient with the Nurse Practitioner student at the time of the visit. I agree with the assessment and plan as written, unless noted below. \par I was present with the Nurse Practitioner during the key portions of the patient’s visit. I discussed the case with the Nurse Practitioner and agree with the findings and plan as documented in the Nurse Practitioner 's note, unless noted below.

## 2022-03-01 NOTE — HISTORY OF PRESENT ILLNESS
[FreeTextEntry1] : follow up [de-identified] : Pt here today to discuss potential side effects from Covid vaccine. Concerned that Covid vaccine exacerbated her arthritis. \par \par Arthritis flare with fatigue and brain fog \par - since Sunday\par -cervical area, b/l hands, b/l hips\par -applied ice/heat, no relief\par -took tylenol this am with cbd cream, much improved

## 2022-03-01 NOTE — PHYSICAL EXAM
[Normal] : normal rate, regular rhythm, normal S1 and S2 and no murmur heard [de-identified] : enlarged knuckles b/l hands

## 2022-03-01 NOTE — PHYSICAL EXAM
[Normal] : normal rate, regular rhythm, normal S1 and S2 and no murmur heard [de-identified] : enlarged knuckles b/l hands

## 2022-03-01 NOTE — HISTORY OF PRESENT ILLNESS
[FreeTextEntry1] : follow up [de-identified] : Pt here today to discuss potential side effects from Covid vaccine. Concerned that Covid vaccine exacerbated her arthritis. \par \par Arthritis flare with fatigue and brain fog \par - since Sunday\par -cervical area, b/l hands, b/l hips\par -applied ice/heat, no relief\par -took tylenol this am with cbd cream, much improved

## 2022-03-25 ENCOUNTER — APPOINTMENT (OUTPATIENT)
Dept: HEART AND VASCULAR | Facility: CLINIC | Age: 82
End: 2022-03-25
Payer: MEDICARE

## 2022-03-25 VITALS
BODY MASS INDEX: 27.31 KG/M2 | TEMPERATURE: 97.3 F | OXYGEN SATURATION: 96 % | HEIGHT: 64 IN | DIASTOLIC BLOOD PRESSURE: 79 MMHG | HEART RATE: 73 BPM | WEIGHT: 160 LBS | SYSTOLIC BLOOD PRESSURE: 143 MMHG

## 2022-03-25 PROCEDURE — 99215 OFFICE O/P EST HI 40 MIN: CPT

## 2022-03-25 RX ORDER — BEMPEDOIC ACID 180 MG/1
180 TABLET, FILM COATED ORAL DAILY
Qty: 30 | Refills: 3 | Status: DISCONTINUED | COMMUNITY
Start: 2022-01-10 | End: 2022-03-25

## 2022-03-25 NOTE — DISCUSSION/SUMMARY
[FreeTextEntry1] : 81 F presents for follow-up of cardiac murmur, hypertension, dilated aortic root, coronary artery calcification on CT chest.\par \par \par ASCVD - Coronary, carotid and aortic arch plaque- \par - Zetia started in 11/2021.  in 1/2022. \par - Does not tolerated statins. Severe muscle aches and brain fog; tolerating Zetia and will continue \par - bempedoid acid was too expensive; she is willing to consider PCSK9i (will investigate costs while she is on vacation)\par - she will add back ASA 81mg daily \par - BP improved, but still elevated; she feel prednisone is making it worse. We had held off on ramipril (On Medscape: minor interaction between ramipril and probenecid. Probenecid may increase the effects of ramipril.) Also confirmed with our pharmacists, no contraindication or dose adjustment needed with patients with CKD and on probenecid. She was put on ramipril 5mg in January and stopped the Probenecid. \par - BP above goal, but pt refuses any changes before her trip to [place]. Will continue to monitor at follow up next month \par \par Aortic root dilatation - Dimensions normal on MRI. Can monitor with echo every 2 years \par \par CKD, gout:\par - She never tried farxiga and cost was also an issue, but ultimately did not want to try it after discussing w/ Dr. Love again at her recent visit 2/2 risk of UTI \par - Dr. Agosto reports that given her underlying medical problems, allergy hx and CKD make treatment of gout very challenging; not many options left to try and low dose prednisone not preferred in the setting of osteoporosis. She will f/u with her other providers once back from her trip to [place]. Told pt to discuss brand name vs generic for synthroid with them. (pt reports high cost w/ brand name but unsure why she was never given generic; I assume possible allergy in the past). \par \par RTC end of April.

## 2022-03-25 NOTE — HISTORY OF PRESENT ILLNESS
[FreeTextEntry1] : 81 F presents for follow-up of cardiac murmur, hypertension, dilated aortic root, coronary artery calcification on CT chest.\par \par \par She had COVID booster on 12/23/21 and feels this has exacerbated her arthritis and was causing joint and muscle pain in her neck, back and ankle pain. She says she will feel fine and then have pain, and denies it is her gout since she says that feels different. \par \par She reports feeling quite well today. Patient denies any chest pain, SOB, palpitations, orthopnea, PND or LE edema.\par \par She feels her BP has been better since adding back the ramipril, but has been elevated since on prednisone. However, she is very hesitant to make any changes, as she "feels" better with her pressure in the 130s/140s systolic and is about to leave for [place] on 4/7 and returning 4/16, so she doesn't want to make changes before her trip.\par \par She has multiple questions about medications - sizes and manufacturers that she prefers. She asks that we don't send to the [facility] location unless she specifically tells us to. \par \par She asked about taking the generic for synthroid - I do not know why she isn't already, and advised to ask Dr. Agosto at her next f/u. \par \par She has been experiencing multiple gout flares since probenecid started round Aug, 2021, requiring treatment with steroids intermittently. Since last visit pt stopped Probenecid. Has R foot and ankle pain and swelling, takes prednisone, on taper She feels she needs the prednisone for gout pain, but also wants to get off of it at some point. She asked for refill to get her through her trip to [place], and will f/u w/ Dr. Love and Dr. Quintana when back for continued management. \par \par Still trying to get previous records from Dr. Cardozo. \par \par \par \par

## 2022-03-25 NOTE — REASON FOR VISIT
[FreeTextEntry1] : 81 F presents for follow-up of cardiac murmur, hypertension, dilated aortic root, coronary artery calcification on CT chest.

## 2022-03-27 ENCOUNTER — EMERGENCY (EMERGENCY)
Facility: HOSPITAL | Age: 82
LOS: 1 days | Discharge: HOME | End: 2022-03-27
Attending: EMERGENCY MEDICINE | Admitting: EMERGENCY MEDICINE
Payer: MEDICARE

## 2022-03-27 VITALS
SYSTOLIC BLOOD PRESSURE: 186 MMHG | WEIGHT: 162.04 LBS | HEIGHT: 66 IN | HEART RATE: 72 BPM | TEMPERATURE: 98 F | RESPIRATION RATE: 16 BRPM | OXYGEN SATURATION: 96 % | DIASTOLIC BLOOD PRESSURE: 71 MMHG
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
## 2022-03-27 DIAGNOSIS — I10 ESSENTIAL (PRIMARY) HYPERTENSION: ICD-10-CM

## 2022-03-27 DIAGNOSIS — S50.12XA CONTUSION OF LEFT FOREARM, INITIAL ENCOUNTER: ICD-10-CM

## 2022-03-27 DIAGNOSIS — Z91.018 ALLERGY TO OTHER FOODS: ICD-10-CM
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
## 2022-03-27 DIAGNOSIS — Z88.0 ALLERGY STATUS TO PENICILLIN: ICD-10-CM
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
## 2022-03-27 DIAGNOSIS — Z88.8 ALLERGY STATUS TO OTHER DRUGS, MEDICAMENTS AND BIOLOGICAL SUBSTANCES STATUS: ICD-10-CM

## 2022-03-27 DIAGNOSIS — Z88.1 ALLERGY STATUS TO OTHER ANTIBIOTIC AGENTS STATUS: ICD-10-CM

## 2022-03-27 DIAGNOSIS — M25.532 PAIN IN LEFT WRIST: ICD-10-CM

## 2022-03-27 DIAGNOSIS — Z90.710 ACQUIRED ABSENCE OF BOTH CERVIX AND UTERUS: Chronic | ICD-10-CM

## 2022-03-27 DIAGNOSIS — Z79.82 LONG TERM (CURRENT) USE OF ASPIRIN: ICD-10-CM

## 2022-03-27 DIAGNOSIS — Z88.2 ALLERGY STATUS TO SULFONAMIDES: ICD-10-CM

## 2022-03-27 DIAGNOSIS — E03.9 HYPOTHYROIDISM, UNSPECIFIED: ICD-10-CM

## 2022-03-27 DIAGNOSIS — R07.81 PLEURODYNIA: ICD-10-CM
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
## 2022-03-27 PROCEDURE — 73130 X-RAY EXAM OF HAND: CPT | Mod: 26

## 2022-03-27 PROCEDURE — 73130 X-RAY EXAM OF HAND: CPT | Mod: 26,LT

## 2022-03-27 PROCEDURE — 71101 X-RAY EXAM UNILAT RIBS/CHEST: CPT

## 2022-03-27 PROCEDURE — 73130 X-RAY EXAM OF HAND: CPT

## 2022-03-27 PROCEDURE — 73110 X-RAY EXAM OF WRIST: CPT | Mod: 26,LT

## 2022-03-27 PROCEDURE — 71101 X-RAY EXAM UNILAT RIBS/CHEST: CPT | Mod: 26,RT

## 2022-03-27 PROCEDURE — 73110 X-RAY EXAM OF WRIST: CPT

## 2022-03-27 PROCEDURE — 99284 EMERGENCY DEPT VISIT MOD MDM: CPT | Mod: 25

## 2022-03-27 PROCEDURE — 99283 EMERGENCY DEPT VISIT LOW MDM: CPT | Mod: FS,25

## 2022-03-27 PROCEDURE — 73110 X-RAY EXAM OF WRIST: CPT | Mod: 26

## 2022-03-27 PROCEDURE — 71101 X-RAY EXAM UNILAT RIBS/CHEST: CPT | Mod: 26

## 2022-03-27 NOTE — ED ADULT NURSE NOTE - NSIMPLEMENTINTERV_GEN_ALL_ED
Implemented All Fall Risk Interventions:  [place] to call system. Call bell, personal items and telephone within reach. Instruct patient to call for assistance. Room bathroom lighting operational. Non-slip footwear when patient is off stretcher. Physically safe environment: no spills, clutter or unnecessary equipment. Stretcher in lowest position, wheels locked, appropriate side rails in place. Provide visual cue, wrist band, yellow gown, etc. Monitor gait and stability. Monitor for mental status changes and reorient to person, place, and time. Review medications for side effects contributing to fall risk. Reinforce activity limits and safety measures with patient and family.

## 2022-03-27 NOTE — ED PROVIDER NOTE - PATIENT PORTAL LINK FT
You can access the FollowMyHealth Patient Portal offered by [facility] by registering at the following website: [email or web address]. By joining [facility]’s FollowMyHealth portal, you will also be able to view your health information using other applications (apps) compatible with our system.
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
## 2022-03-27 NOTE — ED PROVIDER NOTE - NS ED ATTENDING STATEMENT MOD
This was a shared visit with the AF[facility]. I reviewed and verified the documentation and independently performed the documented:
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
## 2022-03-27 NOTE — ED PROVIDER NOTE - RESPIRATORY, MLM
Breath sounds clear and equal bilaterally.  +mild tenderness to right lower anterior chest wall in area of 9th rib, no crepitus, +mild ecchymosis to right breast with no palpable hematoma, tissue soft

## 2022-03-28 ENCOUNTER — TRANSCRIPTION ENCOUNTER (OUTPATIENT)
Age: 82
End: 2022-03-28

## 2022-03-28 NOTE — ED POST DISCHARGE NOTE - DETAILS
Pt states no difficulty breathing/SOB, no trouble taking deep breath, no fevers.  She says she will do deep breathing exercises and follow up with Dr Guzman this week.  Advised return to ED for any new/worsening symptoms, any other concerns.

## 2022-03-30 ENCOUNTER — TRANSCRIPTION ENCOUNTER (OUTPATIENT)
Age: 82
End: 2022-03-30

## 2022-03-31 DIAGNOSIS — K04.7 PERIAPICAL ABSCESS W/OUT SINUS: ICD-10-CM

## 2022-04-06 ENCOUNTER — TRANSCRIPTION ENCOUNTER (OUTPATIENT)
Age: 82
End: 2022-04-06

## 2022-04-08 ENCOUNTER — NON-APPOINTMENT (OUTPATIENT)
Age: 82
End: 2022-04-08

## 2022-04-12 ENCOUNTER — TRANSCRIPTION ENCOUNTER (OUTPATIENT)
Age: 82
End: 2022-04-12

## 2022-04-22 ENCOUNTER — APPOINTMENT (OUTPATIENT)
Dept: HEART AND VASCULAR | Facility: CLINIC | Age: 82
End: 2022-04-22

## 2022-04-26 ENCOUNTER — TRANSCRIPTION ENCOUNTER (OUTPATIENT)
Age: 82
End: 2022-04-26

## 2022-04-26 ENCOUNTER — APPOINTMENT (OUTPATIENT)
Dept: OBGYN | Facility: CLINIC | Age: 82
End: 2022-04-26
Payer: MEDICARE

## 2022-04-26 VITALS
HEART RATE: 72 BPM | SYSTOLIC BLOOD PRESSURE: 150 MMHG | DIASTOLIC BLOOD PRESSURE: 73 MMHG | HEIGHT: 64 IN | OXYGEN SATURATION: 96 % | BODY MASS INDEX: 27.83 KG/M2 | WEIGHT: 163 LBS

## 2022-04-26 DIAGNOSIS — Z01.419 ENCOUNTER FOR GYNECOLOGICAL EXAMINATION (GENERAL) (ROUTINE) W/OUT ABNORMAL FINDINGS: ICD-10-CM

## 2022-04-26 PROCEDURE — G0101: CPT

## 2022-04-26 NOTE — PHYSICAL EXAM
[Examination Of The Breasts] : a normal appearance [No Masses] : no breast masses were palpable [Labia Majora] : normal [Labia Minora] : normal [Normal] : normal [Absent] : absent [Uterine Adnexae] : non-palpable

## 2022-04-26 NOTE — HISTORY OF PRESENT ILLNESS
[Patient reported PAP Smear was normal] : Patient reported PAP Smear was normal [Currently In Menopause] : currently in menopause [Post-Menopause, No Sxs] : post-menopausal, currently without symptoms [Mammogramdate] : 4/21/20224/21/2022 [BreastSonogramDate] : 4/21/2022 [PapSmeardate] : 4/7/2021 [Previously active] : previously active

## 2022-04-29 ENCOUNTER — APPOINTMENT (OUTPATIENT)
Dept: HEART AND VASCULAR | Facility: CLINIC | Age: 82
End: 2022-04-29
Payer: MEDICARE

## 2022-04-29 VITALS — SYSTOLIC BLOOD PRESSURE: 140 MMHG | DIASTOLIC BLOOD PRESSURE: 79 MMHG

## 2022-04-29 VITALS
DIASTOLIC BLOOD PRESSURE: 83 MMHG | TEMPERATURE: 97.2 F | SYSTOLIC BLOOD PRESSURE: 167 MMHG | HEART RATE: 76 BPM | OXYGEN SATURATION: 95 % | BODY MASS INDEX: 27.83 KG/M2 | HEIGHT: 64 IN | WEIGHT: 163 LBS

## 2022-04-29 PROCEDURE — 99215 OFFICE O/P EST HI 40 MIN: CPT

## 2022-04-29 NOTE — DISCUSSION/SUMMARY
[FreeTextEntry1] : 81 F presents for follow-up of hyperlipidemia, CKD 4, hypertension, dilated aortic root, coronary artery calcification on CT chest, gout.\par \par ASCVD - Coronary, carotid and aortic arch plaque- \par - Zetia started in 11/2021 and tolerating well. \par - Will start repatha, in order to afford this she would like to switch to generic levothyroxine due to the co pay -- advised her to speak to Dr Yeboah. Will recheck lipids 5 weeks from starting her repatha. \par - Does not tolerated statins. Severe muscle aches and brain fog; tolerating Zetia and will continue \par - cw  ASA 81mg daily \par - BP  still elevated; reports better at home but doesn't recall. She will return in 2 weeks for an appointment with Allie for administering her Repatha. Will recheck BP's at that time and if still elevated, will suggest 24 hour ABPM with Dr. Love. \par \par Aortic root ectasia 3.7cm - Dimensions normal on MRI. Can monitor with echo every 2-3 years. \par \par CKD,\par - She never tried farxiga and cost was also an issue\par \par \par
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
## 2022-04-30 LAB — CYTOLOGY CVX/VAG DOC THIN PREP: NORMAL

## 2022-05-10 ENCOUNTER — APPOINTMENT (OUTPATIENT)
Dept: INTERNAL MEDICINE | Facility: CLINIC | Age: 82
End: 2022-05-10
Payer: MEDICARE

## 2022-05-10 VITALS
TEMPERATURE: 97.9 F | RESPIRATION RATE: 16 BRPM | WEIGHT: 163 LBS | HEART RATE: 79 BPM | OXYGEN SATURATION: 98 % | SYSTOLIC BLOOD PRESSURE: 156 MMHG | HEIGHT: 60 IN | BODY MASS INDEX: 32 KG/M2 | DIASTOLIC BLOOD PRESSURE: 75 MMHG

## 2022-05-10 VITALS — DIASTOLIC BLOOD PRESSURE: 76 MMHG | HEART RATE: 79 BPM | SYSTOLIC BLOOD PRESSURE: 137 MMHG

## 2022-05-10 DIAGNOSIS — T78.2XXA ANAPHYLACTIC SHOCK, UNSPECIFIED, INITIAL ENCOUNTER: ICD-10-CM

## 2022-05-10 DIAGNOSIS — Z88.9 ALLERGY STATUS TO UNSPECIFIED DRUGS, MEDICAMENTS AND BIOLOGICAL SUBSTANCES: ICD-10-CM

## 2022-05-10 DIAGNOSIS — T50.905A ANAPHYLACTIC SHOCK, UNSPECIFIED, INITIAL ENCOUNTER: ICD-10-CM

## 2022-05-10 PROCEDURE — 99213 OFFICE O/P EST LOW 20 MIN: CPT

## 2022-05-10 RX ORDER — DOXYCYCLINE HYCLATE 100 MG/1
100 TABLET ORAL
Qty: 20 | Refills: 0 | Status: COMPLETED | COMMUNITY
Start: 2022-03-31 | End: 2022-05-10

## 2022-05-10 NOTE — HISTORY OF PRESENT ILLNESS
[FreeTextEntry1] : follow up [de-identified] : hypothyroidism\par - Synthroid brand is very expensive, is agreeable to try generic\par - will [facility], if no problem to continue, if has side effect to resume brand\par \par HLD\par - has Repatha pen, but afraid to use it because of effects on sugar\par - does not want to try it today as she doesn't have her EpiPen with her and is afraid will develop allergic reaction.\par \par covid vax\par - concerned about second booster, b/c after 1st booster she had sever back pain and joint pains.

## 2022-06-10 ENCOUNTER — APPOINTMENT (OUTPATIENT)
Dept: HEART AND VASCULAR | Facility: CLINIC | Age: 82
End: 2022-06-10

## 2022-06-13 ENCOUNTER — NON-APPOINTMENT (OUTPATIENT)
Age: 82
End: 2022-06-13

## 2022-06-15 ENCOUNTER — APPOINTMENT (OUTPATIENT)
Dept: HEART AND VASCULAR | Facility: CLINIC | Age: 82
End: 2022-06-15

## 2022-06-20 ENCOUNTER — NON-APPOINTMENT (OUTPATIENT)
Age: 82
End: 2022-06-20

## 2022-06-20 ENCOUNTER — TRANSCRIPTION ENCOUNTER (OUTPATIENT)
Age: 82
End: 2022-06-20

## 2022-06-26 ENCOUNTER — EMERGENCY (EMERGENCY)
Facility: HOSPITAL | Age: 82
LOS: 1 days | Discharge: LEFT BEFORE DISCHARGE | End: 2022-06-26
Attending: EMERGENCY MEDICINE | Admitting: EMERGENCY MEDICINE
Payer: MEDICARE

## 2022-06-26 VITALS
DIASTOLIC BLOOD PRESSURE: 78 MMHG | SYSTOLIC BLOOD PRESSURE: 188 MMHG | OXYGEN SATURATION: 99 % | HEART RATE: 70 BPM | TEMPERATURE: 98 F | RESPIRATION RATE: 18 BRPM

## 2022-06-26 VITALS
HEART RATE: 82 BPM | WEIGHT: 154.98 LBS | SYSTOLIC BLOOD PRESSURE: 179 MMHG | DIASTOLIC BLOOD PRESSURE: 92 MMHG | OXYGEN SATURATION: 96 % | HEIGHT: 66 IN | TEMPERATURE: 98 F | RESPIRATION RATE: 18 BRPM

## 2022-06-26 DIAGNOSIS — Z90.710 ACQUIRED ABSENCE OF BOTH CERVIX AND UTERUS: ICD-10-CM

## 2022-06-26 DIAGNOSIS — Z88.0 ALLERGY STATUS TO PENICILLIN: ICD-10-CM

## 2022-06-26 DIAGNOSIS — Z88.2 ALLERGY STATUS TO SULFONAMIDES: ICD-10-CM

## 2022-06-26 DIAGNOSIS — R11.2 NAUSEA WITH VOMITING, UNSPECIFIED: ICD-10-CM

## 2022-06-26 DIAGNOSIS — C45.1 MESOTHELIOMA OF PERITONEUM: ICD-10-CM

## 2022-06-26 DIAGNOSIS — Z88.8 ALLERGY STATUS TO OTHER DRUGS, MEDICAMENTS AND BIOLOGICAL SUBSTANCES STATUS: ICD-10-CM

## 2022-06-26 DIAGNOSIS — Z79.82 LONG TERM (CURRENT) USE OF ASPIRIN: ICD-10-CM

## 2022-06-26 DIAGNOSIS — Z90.49 ACQUIRED ABSENCE OF OTHER SPECIFIED PARTS OF DIGESTIVE TRACT: ICD-10-CM

## 2022-06-26 DIAGNOSIS — Z88.1 ALLERGY STATUS TO OTHER ANTIBIOTIC AGENTS STATUS: ICD-10-CM

## 2022-06-26 DIAGNOSIS — E03.9 HYPOTHYROIDISM, UNSPECIFIED: ICD-10-CM

## 2022-06-26 DIAGNOSIS — Z53.29 PROCEDURE AND TREATMENT NOT CARRIED OUT BECAUSE OF PATIENT'S DECISION FOR OTHER REASONS: ICD-10-CM

## 2022-06-26 DIAGNOSIS — Z87.19 PERSONAL HISTORY OF OTHER DISEASES OF THE DIGESTIVE SYSTEM: ICD-10-CM

## 2022-06-26 DIAGNOSIS — Z90.710 ACQUIRED ABSENCE OF BOTH CERVIX AND UTERUS: Chronic | ICD-10-CM

## 2022-06-26 DIAGNOSIS — Z20.822 CONTACT WITH AND (SUSPECTED) EXPOSURE TO COVID-19: ICD-10-CM

## 2022-06-26 DIAGNOSIS — I10 ESSENTIAL (PRIMARY) HYPERTENSION: ICD-10-CM

## 2022-06-26 DIAGNOSIS — M10.9 GOUT, UNSPECIFIED: ICD-10-CM

## 2022-06-26 DIAGNOSIS — K56.609 UNSPECIFIED INTESTINAL OBSTRUCTION, UNSPECIFIED AS TO PARTIAL VERSUS COMPLETE OBSTRUCTION: ICD-10-CM

## 2022-06-26 DIAGNOSIS — Z91.018 ALLERGY TO OTHER FOODS: ICD-10-CM

## 2022-06-26 LAB
ALBUMIN SERPL ELPH-MCNC: 4.4 G/DL — SIGNIFICANT CHANGE UP (ref 3.3–5)
ALP SERPL-CCNC: 88 U/L — SIGNIFICANT CHANGE UP (ref 40–120)
ALT FLD-CCNC: 15 U/L — SIGNIFICANT CHANGE UP (ref 10–45)
ANION GAP SERPL CALC-SCNC: 14 MMOL/L — SIGNIFICANT CHANGE UP (ref 5–17)
APTT BLD: 29.4 SEC — SIGNIFICANT CHANGE UP (ref 27.5–35.5)
AST SERPL-CCNC: 25 U/L — SIGNIFICANT CHANGE UP (ref 10–40)
BASOPHILS # BLD AUTO: 0.03 K/UL — SIGNIFICANT CHANGE UP (ref 0–0.2)
BASOPHILS NFR BLD AUTO: 0.3 % — SIGNIFICANT CHANGE UP (ref 0–2)
BILIRUB SERPL-MCNC: 0.4 MG/DL — SIGNIFICANT CHANGE UP (ref 0.2–1.2)
BUN SERPL-MCNC: 25 MG/DL — HIGH (ref 7–23)
CALCIUM SERPL-MCNC: 10.7 MG/DL — HIGH (ref 8.4–10.5)
CHLORIDE SERPL-SCNC: 106 MMOL/L — SIGNIFICANT CHANGE UP (ref 96–108)
CO2 SERPL-SCNC: 24 MMOL/L — SIGNIFICANT CHANGE UP (ref 22–31)
CREAT SERPL-MCNC: 1.6 MG/DL — HIGH (ref 0.5–1.3)
EGFR: 33 ML/MIN/1.73M2 — LOW
EOSINOPHIL # BLD AUTO: 0.02 K/UL — SIGNIFICANT CHANGE UP (ref 0–0.5)
EOSINOPHIL NFR BLD AUTO: 0.2 % — SIGNIFICANT CHANGE UP (ref 0–6)
GLUCOSE SERPL-MCNC: 115 MG/DL — HIGH (ref 70–99)
HCT VFR BLD CALC: 40.4 % — SIGNIFICANT CHANGE UP (ref 34.5–45)
HGB BLD-MCNC: 13.1 G/DL — SIGNIFICANT CHANGE UP (ref 11.5–15.5)
IMM GRANULOCYTES NFR BLD AUTO: 0.1 % — SIGNIFICANT CHANGE UP (ref 0–1.5)
INR BLD: 1 — SIGNIFICANT CHANGE UP (ref 0.88–1.16)
LACTATE SERPL-SCNC: 1 MMOL/L — SIGNIFICANT CHANGE UP (ref 0.5–2)
LYMPHOCYTES # BLD AUTO: 1.1 K/UL — SIGNIFICANT CHANGE UP (ref 1–3.3)
LYMPHOCYTES # BLD AUTO: 12.4 % — LOW (ref 13–44)
MCHC RBC-ENTMCNC: 30.7 PG — SIGNIFICANT CHANGE UP (ref 27–34)
MCHC RBC-ENTMCNC: 32.4 GM/DL — SIGNIFICANT CHANGE UP (ref 32–36)
MCV RBC AUTO: 94.6 FL — SIGNIFICANT CHANGE UP (ref 80–100)
MONOCYTES # BLD AUTO: 0.66 K/UL — SIGNIFICANT CHANGE UP (ref 0–0.9)
MONOCYTES NFR BLD AUTO: 7.4 % — SIGNIFICANT CHANGE UP (ref 2–14)
NEUTROPHILS # BLD AUTO: 7.05 K/UL — SIGNIFICANT CHANGE UP (ref 1.8–7.4)
NEUTROPHILS NFR BLD AUTO: 79.6 % — HIGH (ref 43–77)
NRBC # BLD: 0 /100 WBCS — SIGNIFICANT CHANGE UP (ref 0–0)
PLATELET # BLD AUTO: 275 K/UL — SIGNIFICANT CHANGE UP (ref 150–400)
POTASSIUM SERPL-MCNC: 4.4 MMOL/L — SIGNIFICANT CHANGE UP (ref 3.5–5.3)
POTASSIUM SERPL-SCNC: 4.4 MMOL/L — SIGNIFICANT CHANGE UP (ref 3.5–5.3)
PROT SERPL-MCNC: 7.4 G/DL — SIGNIFICANT CHANGE UP (ref 6–8.3)
PROTHROM AB SERPL-ACNC: 11.9 SEC — SIGNIFICANT CHANGE UP (ref 10.5–13.4)
RBC # BLD: 4.27 M/UL — SIGNIFICANT CHANGE UP (ref 3.8–5.2)
RBC # FLD: 14.3 % — SIGNIFICANT CHANGE UP (ref 10.3–14.5)
SARS-COV-2 RNA SPEC QL NAA+PROBE: NEGATIVE — SIGNIFICANT CHANGE UP
SODIUM SERPL-SCNC: 144 MMOL/L — SIGNIFICANT CHANGE UP (ref 135–145)
WBC # BLD: 8.87 K/UL — SIGNIFICANT CHANGE UP (ref 3.8–10.5)
WBC # FLD AUTO: 8.87 K/UL — SIGNIFICANT CHANGE UP (ref 3.8–10.5)

## 2022-06-26 PROCEDURE — 74176 CT ABD & PELVIS W/O CONTRAST: CPT | Mod: MA

## 2022-06-26 PROCEDURE — 99284 EMERGENCY DEPT VISIT MOD MDM: CPT

## 2022-06-26 PROCEDURE — 85730 THROMBOPLASTIN TIME PARTIAL: CPT

## 2022-06-26 PROCEDURE — 85610 PROTHROMBIN TIME: CPT

## 2022-06-26 PROCEDURE — 87635 SARS-COV-2 COVID-19 AMP PRB: CPT

## 2022-06-26 PROCEDURE — 85025 COMPLETE CBC W/AUTO DIFF WBC: CPT

## 2022-06-26 PROCEDURE — 36415 COLL VENOUS BLD VENIPUNCTURE: CPT

## 2022-06-26 PROCEDURE — 83605 ASSAY OF LACTIC ACID: CPT

## 2022-06-26 PROCEDURE — 99284 EMERGENCY DEPT VISIT MOD MDM: CPT | Mod: 25

## 2022-06-26 PROCEDURE — 80053 COMPREHEN METABOLIC PANEL: CPT

## 2022-06-26 PROCEDURE — 74176 CT ABD & PELVIS W/O CONTRAST: CPT | Mod: 26,MA

## 2022-06-26 RX ORDER — SODIUM CHLORIDE 9 MG/ML
1000 INJECTION INTRAMUSCULAR; INTRAVENOUS; SUBCUTANEOUS ONCE
Refills: 0 | Status: COMPLETED | OUTPATIENT
Start: 2022-06-26 | End: 2022-06-26

## 2022-06-26 RX ORDER — DIATRIZOATE MEGLUMINE 180 MG/ML
30 INJECTION, SOLUTION INTRAVESICAL ONCE
Refills: 0 | Status: COMPLETED | OUTPATIENT
Start: 2022-06-26 | End: 2022-06-26

## 2022-06-26 RX ADMIN — DIATRIZOATE MEGLUMINE 30 MILLILITER(S): 180 INJECTION, SOLUTION INTRAVESICAL at 17:19

## 2022-06-26 RX ADMIN — SODIUM CHLORIDE 2000 MILLILITER(S): 9 INJECTION INTRAMUSCULAR; INTRAVENOUS; SUBCUTANEOUS at 15:22

## 2022-06-26 NOTE — CONSULT NOTE ADULT - ASSESSMENT
81F PMH gout, peritoneal mesothelioma (1999) now in remission s/p partial hepatectomy, partial diaphragm resection, hysterectomy, with recurrent SBOs since cisplatin, additional PSHx appendectomy, presents today with nausea and emesis at home which she says have now resolved. CT consistent w/ SBO.     Patient says she has to leave for 2 hours  Counseled extensively on recommendation to stay for SBO  Patient leaving AMA  Recommend if she will not accept surgery here to go to [facility] ED where she would accept surgery if needed  Patient plans to go home AMA for 2 hours before going to [facility]   Surgery team 4  Dr. Prescott

## 2022-06-26 NOTE — ED PROVIDER NOTE - PATIENT PORTAL LINK FT
You can access the FollowMyHealth Patient Portal offered by [facility] by registering at the following website: [email or web address]. By joining [employer]’s FollowMyHealth portal, you will also be able to view your health information using other applications (apps) compatible with our system.

## 2022-06-26 NOTE — ED PROVIDER NOTE - PROGRESS NOTE DETAILS
PT w deidre, seen by surgery leaving AMA to go to [place]  Patient desires to leave emergency department against medical advice, prior to completion of my desired evaluation and treatment plan.  Patient's mental status is normal, patient is fully alert and oriented x person, place and time.  Patient demonstrates clear reasoning capabilities and capacity to make this decision.  Patient fully understands the explained risks involved with this decision including worsening of medical condition, missed and or delayed diagnosis, permanent disability and death.  All alternative options given to patient and still desires discharge against medical advice from ED and will follow up as an outpatient.  Patient given the option to return to ED at any time to have further evaluation and treatment. Pt w deidre, seen by surgery leaving AMA to go to [place]  Patient desires to leave emergency department against medical advice, prior to completion of my desired evaluation and treatment plan.  Patient's mental status is normal, patient is fully alert and oriented x person, place and time.  Patient demonstrates clear reasoning capabilities and capacity to make this decision.  Patient fully understands the explained risks involved with this decision including worsening of medical condition, missed and or delayed diagnosis, permanent disability and death.  All alternative options given to patient and still desires discharge against medical advice from ED and will follow up as an outpatient.  Patient given the option to return to ED at any time to have further evaluation and treatment.

## 2022-06-26 NOTE — ED PROVIDER NOTE - PRINCIPAL DIAGNOSIS
Vomiting Muscle Hinge Flap Text: The defect edges were debeveled with a #15 scalpel blade.  Given the size, depth and location of the defect and the proximity to free margins a muscle hinge flap was deemed most appropriate.  Using a sterile surgical marker, an appropriate hinge flap was drawn incorporating the defect. The area thus outlined was incised with a #15 scalpel blade.  The skin margins were undermined to an appropriate distance in all directions utilizing iris scissors.

## 2022-06-26 NOTE — ED PROVIDER NOTE - PHYSICAL EXAMINATION
CONSTITUTIONAL: Awake, alert and in no apparent distress.  HEENT: Head is atraumatic. Eyes clear bilaterally, normal EOMI. Airway patent.  CARDIAC: Normal rate, regular rhythm.  Heart sounds S1, S2.   RESPIRATORY: Breath sounds clear and equal bilaterally. no tachypnea, respiratory distress.   GASTROINTESTINAL: Abdomen soft, non-tender, no guarding, distension.  MUSCULOSKELETAL: Spine appears normal, no midline spinal tenderness, range of motion is not limited, no muscle or joint tenderness. no bony tenderness.   NEUROLOGICAL: Alert, no focal deficits, no motor or sensory deficits.  SKIN: Skin normal color for race, warm, dry and intact. No evidence of rash.  PSYCHIATRIC: Normal mood and affect. no apparent risk to self or others. You can access the FollowMyHealth Patient Portal offered by [facility] by registering at the following website: [email or web address]. By joining [employer]’s FollowMyHealth portal, you will also be able to view your health information using other applications (apps) compatible with our system.

## 2022-06-26 NOTE — ED ADULT TRIAGE NOTE - CHIEF COMPLAINT QUOTE
Pt presents co nausea and vomiting since yesterday evening. Denies fevers, chills, diarrhea, bloody emesis, urinary sx.

## 2022-06-26 NOTE — ED PROVIDER NOTE - CLINICAL SUMMARY MEDICAL DECISION MAKING FREE TEXT BOX
Pt w hx of SBOs w vomiting and abd tenderness, initially refusing CT, then agreeing, pt +SBO on CT, surg consulted, pt deciding to leave AMA to go to [facility] where she had previous surgeries.

## 2022-06-26 NOTE — CONSULT NOTE ADULT - SUBJECTIVE AND OBJECTIVE BOX
Surgery Consult      Consulting surgical team: [ ] 1 - colorectal/surg onc   [ ] 2 - MIS/bariatrics   [x] 4 - acute care   [ ] 5 - general surgery/breast/pediatrics  Consulting attending: Dr. Prescott      HPI: 81F PMH gout, peritoneal mesothelioma (1999) now in remission s/p partial hepatectomy, partial diaphragm resection, hysterectomy, with recurrent SBOs since cisplatin, additional PSHx appendectomy, presents today with nausea and emesis at home which she says have now resolved. Patient reports that she used to get 3-4 SBOs annually for 20 yr, now has not had one in a year. Never needed NGT or surgery. She typically stops eating for several days and it resolves on its own. She is familiar with the pain that she gets, which she describes as central and points at her midline. Had 3 regular bowel movements this AM. Says she is passing gas. Last cscope recent with polyps and plans to return in 3 years. Patient reports that if she needs surgery she will only accept it [place] [facility].    PMH: gout, peritoneal mesothelioma (1999) now in remission  PSHx: partial hepatectomy, partial diaphragm resection, hysterectomy; appendectomy  Meds: synthroid 0.1, folic acid, amlodipine 10, ezetimibe 10, ramipril 5, prevacid, B100 complex, stool softener, coQ 100, tumeric 100, tart cherry, celery seed capsule  All: can take doxycyline; throat closing: cipro, PCN, sulfa, statin, allopurinol, colcrys (gout), papaya, justen (rash)        Vitals:  Vital Signs Last 24 Hrs  T(C): 36.7 (26 Jun 2022 21:00), Max: 36.8 (26 Jun 2022 14:31)  T(F): 98 (26 Jun 2022 21:00), Max: 98.2 (26 Jun 2022 14:31)  HR: 70 (26 Jun 2022 21:00) (68 - 82)  BP: 188/78 (26 Jun 2022 21:00) (179/92 - 188/80)  BP(mean): --  RR: 18 (26 Jun 2022 21:00) (18 - 18)  SpO2: 99% (26 Jun 2022 21:00) (96% - 99%)      PHYSICAL EXAM:  General: NAD, resting comfortably in bed  Pulm: Nonlabored breathing, no respiratory distress  Abd: soft, mildly distended, NT; well healed midline laparotomy scar  Extrem: WWP, no edema  Neuro: A/O x 3, CNs II-XII grossly intact, no focal deficits, normal sensation          LABS:                        13.1   8.87  )-----------( 275      ( 26 Jun 2022 14:43 )             40.4     06-26    144  |  106  |  25<H>  ----------------------------<  115<H>  4.4   |  24  |  1.60<H>    Ca    10.7<H>      26 Jun 2022 14:43    TPro  7.4  /  Alb  4.4  /  TBili  0.4  /  DBili  x   /  AST  25  /  ALT  15  /  AlkPhos  88  06-26    Lactate: Lactate, Blood: 1.0 mmol/L (06-26 @ 17:17)     PT/INR - ( 26 Jun 2022 14:43 )   PT: 11.9 sec;   INR: 1.00          PTT - ( 26 Jun 2022 14:43 )  PTT:29.4 sec          IMAGING:  < from: CT Abdomen and Pelvis w/ Oral Cont (06.26.22 @ 19:01) >  Small bowel obstruction with transition point likely in the right lower   quadrant.

## 2022-06-26 NOTE — ED ADULT NURSE NOTE - OBJECTIVE STATEMENT
Pt reports n/v since yesterday. Hx of colon CA. Reports abd tenderness with palpation and denies nausea at this time, no chest pain, sob, diarrhea. 3 BMs this AM.

## 2022-06-26 NOTE — ED PROVIDER NOTE - OBJECTIVE STATEMENT
82 y/o f hx HTN, hypothyroidism,  PMHx of partial bowel obstructions (with difficulty holding food after eating) and surgeries with multiple adhesions in the pastw complaints of vomiting/nausea since yesterday. Some abd tenderness. Last vomited at 11a today, passing flatus and bm this morning. 82 y/o f hx HTN, hypothyroidism,  PMHx of partial bowel obstructions (with difficulty holding food after eating) and surgeries with multiple adhesions in the past w complaints of vomiting/nausea since yesterday. Some lower abd tenderness. Last vomited at 11a today, passing flatus and bm this morning. no f/c, GI bleed sx, chest pain, sob.

## 2022-08-09 ENCOUNTER — LABORATORY RESULT (OUTPATIENT)
Age: 82
End: 2022-08-09

## 2022-08-09 ENCOUNTER — APPOINTMENT (OUTPATIENT)
Dept: INTERNAL MEDICINE | Facility: CLINIC | Age: 82
End: 2022-08-09

## 2022-08-09 VITALS
SYSTOLIC BLOOD PRESSURE: 145 MMHG | HEIGHT: 60 IN | RESPIRATION RATE: 16 BRPM | TEMPERATURE: 98 F | HEART RATE: 76 BPM | WEIGHT: 158 LBS | OXYGEN SATURATION: 96 % | DIASTOLIC BLOOD PRESSURE: 70 MMHG | BODY MASS INDEX: 31.02 KG/M2

## 2022-08-09 DIAGNOSIS — U07.1 COVID-19: ICD-10-CM

## 2022-08-09 DIAGNOSIS — Z23 ENCOUNTER FOR IMMUNIZATION: ICD-10-CM

## 2022-08-09 DIAGNOSIS — R06.00 DYSPNEA, UNSPECIFIED: ICD-10-CM

## 2022-08-09 DIAGNOSIS — R31.9 HEMATURIA, UNSPECIFIED: ICD-10-CM

## 2022-08-09 DIAGNOSIS — R60.0 LOCALIZED EDEMA: ICD-10-CM

## 2022-08-09 PROCEDURE — 36415 COLL VENOUS BLD VENIPUNCTURE: CPT

## 2022-08-09 PROCEDURE — 96372 THER/PROPH/DIAG INJ SC/IM: CPT

## 2022-08-09 PROCEDURE — G0439: CPT | Mod: CS

## 2022-08-09 RX ORDER — CYANOCOBALAMIN 1000 UG/ML
1000 INJECTION INTRAMUSCULAR; SUBCUTANEOUS
Qty: 0 | Refills: 0 | Status: COMPLETED | OUTPATIENT
Start: 2022-08-09

## 2022-08-09 RX ORDER — NIRMATRELVIR AND RITONAVIR 300-100 MG
20 X 150 MG & KIT ORAL
Qty: 20 | Refills: 0 | Status: COMPLETED | COMMUNITY
Start: 2022-07-05 | End: 2022-08-09

## 2022-08-09 RX ORDER — LORAZEPAM 0.5 MG
TABLET ORAL
Refills: 0 | Status: COMPLETED | COMMUNITY
End: 2022-08-09

## 2022-08-09 RX ORDER — NIRMATRELVIR AND RITONAVIR 150-100 MG
10 X 150 MG & KIT ORAL
Qty: 10 | Refills: 0 | Status: COMPLETED | COMMUNITY
Start: 2022-07-05 | End: 2022-08-09

## 2022-08-09 RX ADMIN — CYANOCOBALAMIN 1 MCG/ML: 1000 INJECTION, SOLUTION INTRAMUSCULAR at 00:00

## 2022-08-10 NOTE — HEALTH RISK ASSESSMENT
[PHQ-2 Negative - No further assessment needed] : PHQ-2 Negative - No further assessment needed [Patient reported mammogram was normal] : Patient reported mammogram was normal [Patient reported bone density results were abnormal] : Patient reported bone density results were abnormal [Patient reported colonoscopy was abnormal] : Patient reported colonoscopy was abnormal [HIV test declined] : HIV test declined [Hepatitis C test declined] : Hepatitis C test declined [Never] : Never [MammogramDate] : 4/2022 [BoneDensityDate] : 4/2022 [BoneDensityComments] : osteoporosis  [ColonoscopyDate] : 2021 [ColonoscopyComments] : October 2022

## 2022-08-10 NOTE — PHYSICAL EXAM
[Normal Sclera/Conjunctiva] : normal sclera/conjunctiva [No JVD] : no jugular venous distention [No Carotid Bruits] : no carotid bruits [No Abdominal Bruit] : a ~M bruit was not heard ~T in the abdomen [No Palpable Aorta] : no palpable aorta [No CVA Tenderness] : no CVA  tenderness [Normal] : affect was normal and insight and judgment were intact [de-identified] : 1+ pitting edema ankles [de-identified] : + surgical scar

## 2022-08-10 NOTE — HISTORY OF PRESENT ILLNESS
[FreeTextEntry1] : annual physical [de-identified] : Pt is a 82 y/o F with PMHx of mesothelioma of abdomen, CKD, HTN, HLD who presents to the office today for annual physical.\par \par Pt c/o decreased energy since being dx'ed with Covid 7/3/22.  Has been taking naps throughout the day which is unusual for her.  Has been doing better recently- gardening, doing ADLs and less napping but still c/o residual mild fatigue.\par \par Pt c/o occasional "difficulty breathing" when doing physical activity x 2-3 years.  She describes it as having to take a few more deep breaths then she usually would.  Pt states it occurs when pt travels from room to room exerts her self.  Denies CP, fever, cough.\par \par Pt reports in June 2022- SBO at [facility]- went to [facility] for management of SBO.  Non-surgically treated.  Has hx of multiple SBO\par \par HCM\par - Covid vaccine: UTD\par - PNA vaccine: has had PNA vaccine- unsure which one/ when\par - Shingrix vaccine: did not get- needs\par - Tdap vaccine: UTD\par - Colonoscopy:  colonoscopy and endoscopy in October 2022 - Dr. Vasquez\par - Mammo: UTD April 2022\par - DEXA: UTD April 2022\par - Ophthalmology: glaucoma- saw yesterday \par - Derm: regularly \par - Pt rides bike w/o helmet- counseled pt on importance of protective gear and emphasized wearing a helmet

## 2022-08-11 ENCOUNTER — APPOINTMENT (OUTPATIENT)
Dept: RHEUMATOLOGY | Facility: CLINIC | Age: 82
End: 2022-08-11

## 2022-08-11 LAB
25(OH)D3 SERPL-MCNC: 32.7 NG/ML
ALBUMIN SERPL ELPH-MCNC: 4.2 G/DL
ALP BLD-CCNC: 81 U/L
ALT SERPL-CCNC: 14 U/L
ANION GAP SERPL CALC-SCNC: 15 MMOL/L
APPEARANCE: ABNORMAL
AST SERPL-CCNC: 23 U/L
BASOPHILS # BLD AUTO: 0.03 K/UL
BASOPHILS NFR BLD AUTO: 0.5 %
BILIRUB SERPL-MCNC: 0.3 MG/DL
BILIRUBIN URINE: NEGATIVE
BLOOD URINE: NEGATIVE
BUN SERPL-MCNC: 31 MG/DL
CALCIUM SERPL-MCNC: 10.6 MG/DL
CALCIUM SERPL-MCNC: 10.6 MG/DL
CALCIUM SERPL-MCNC: 10.7 MG/DL
CHLORIDE SERPL-SCNC: 107 MMOL/L
CHOLEST SERPL-MCNC: 153 MG/DL
CO2 SERPL-SCNC: 20 MMOL/L
COLOR: YELLOW
CREAT SERPL-MCNC: 1.49 MG/DL
EGFR: 36 ML/MIN/1.73M2
EOSINOPHIL # BLD AUTO: 0.18 K/UL
EOSINOPHIL NFR BLD AUTO: 3 %
ESTIMATED AVERAGE GLUCOSE: 120 MG/DL
FOLATE SERPL-MCNC: >20 NG/ML
GLUCOSE QUALITATIVE U: NEGATIVE
GLUCOSE SERPL-MCNC: 87 MG/DL
HBA1C MFR BLD HPLC: 5.8 %
HCT VFR BLD CALC: 38.3 %
HDLC SERPL-MCNC: 82 MG/DL
HGB BLD-MCNC: 11.7 G/DL
IMM GRANULOCYTES NFR BLD AUTO: 0.3 %
KETONES URINE: NEGATIVE
LDLC SERPL CALC-MCNC: 55 MG/DL
LEUKOCYTE ESTERASE URINE: ABNORMAL
LYMPHOCYTES # BLD AUTO: 1.28 K/UL
LYMPHOCYTES NFR BLD AUTO: 21.5 %
MAN DIFF?: NORMAL
MCHC RBC-ENTMCNC: 30.2 PG
MCHC RBC-ENTMCNC: 30.5 GM/DL
MCV RBC AUTO: 99 FL
MONOCYTES # BLD AUTO: 0.76 K/UL
MONOCYTES NFR BLD AUTO: 12.8 %
NEUTROPHILS # BLD AUTO: 3.68 K/UL
NEUTROPHILS NFR BLD AUTO: 61.9 %
NITRITE URINE: POSITIVE
NONHDLC SERPL-MCNC: 71 MG/DL
PARATHYROID HORMONE INTACT: 105 PG/ML
PH URINE: 6.5
PLATELET # BLD AUTO: 235 K/UL
POTASSIUM SERPL-SCNC: 4.8 MMOL/L
PROT SERPL-MCNC: 7 G/DL
PROTEIN URINE: NORMAL
RBC # BLD: 3.87 M/UL
RBC # FLD: 15.5 %
SODIUM SERPL-SCNC: 142 MMOL/L
SPECIFIC GRAVITY URINE: 1.02
TRIGL SERPL-MCNC: 82 MG/DL
TSH SERPL-ACNC: 0.05 UIU/ML
UROBILINOGEN URINE: NORMAL
VIT B12 SERPL-MCNC: 427 PG/ML
WBC # FLD AUTO: 5.95 K/UL

## 2022-08-17 ENCOUNTER — NON-APPOINTMENT (OUTPATIENT)
Age: 82
End: 2022-08-17

## 2022-08-19 ENCOUNTER — APPOINTMENT (OUTPATIENT)
Dept: HEART AND VASCULAR | Facility: CLINIC | Age: 82
End: 2022-08-19

## 2022-08-19 ENCOUNTER — NON-APPOINTMENT (OUTPATIENT)
Age: 82
End: 2022-08-19

## 2022-08-19 VITALS
HEART RATE: 72 BPM | OXYGEN SATURATION: 97 % | HEIGHT: 60 IN | WEIGHT: 156 LBS | BODY MASS INDEX: 30.63 KG/M2 | TEMPERATURE: 98.2 F | SYSTOLIC BLOOD PRESSURE: 136 MMHG | DIASTOLIC BLOOD PRESSURE: 66 MMHG

## 2022-08-19 PROCEDURE — 93000 ELECTROCARDIOGRAM COMPLETE: CPT

## 2022-08-19 PROCEDURE — 99215 OFFICE O/P EST HI 40 MIN: CPT

## 2022-08-19 RX ORDER — EPINEPHRINE 0.3 MG/.3ML
INJECTION INTRAMUSCULAR
Refills: 0 | Status: COMPLETED | COMMUNITY
End: 2022-08-19

## 2022-08-19 NOTE — PHYSICAL EXAM
[Normal] : alert and oriented, normal memory [de-identified] : 2/6 systolic murmur heard throughout

## 2022-08-19 NOTE — HISTORY OF PRESENT ILLNESS
[FreeTextEntry1] : 81 F presents for follow-up of hyperlipidemia, CKD 4, hypertension, dilated aortic root, coronary artery calcification on CT chest, gout.\par \par She was in [place] from July 5th - August 2nd. \par \par She was told by her Nephrologist to stop the ASA 81 mg due to CKD.\par \par She had her Tryptase level drawn by her allergist which continues to be elevated. \par \par She continues to complain of trouble taking a deep breath, unchanged for the past 2-3 years. Dr. Guzman suggested pulm referral. \par \par She was prescribed Paxlovid for COVID infection by Dr. Carrion on July 5th to be taken only twice daily for 5 days rather than 3 because of renal clearance. She stopped the medication after one dose because she did not like how it made her feel. COVID symptoms included low grade fever, sore throat, cough and poor appetite. \par \par She just saw Dr. Guzman on 8/9 and c/o fatigue post COVID infection. \par \par Patient denies any chest pain, palpitations, orthopnea, PND or LE edema.\par \par 8/11/2022:  TG 82 HDL 82 LDL 55 A1c 5.8%\par \par ================================================================================\par 6/15/22:  TG 55 HDL 83 LDL 52 (on Zetia and Repatha)\par \par Pt reports in June 2022- SBO at [facility]- went to [facility] for management of SBO. Non-surgically treated. Has hx of multiple SBO\par \par She had second booster shot at [facility] on June14th\par She has colonoscopy /endoscopy scheduled with Dr Zach Vasquez on Oct 15th. ( 3 years)  \par \par Records from Dr. Cardozo's office:\par \par LE arterial 9/2020 - reduced blood flow; doppler is biphasic, suggestive of mild PAD; increased wall resistance\par \par LE venous 9/2020 - incompetent valves seen in PTV/PTV in both legs; varicose veins\par \par CT neck w/out contrast 2/2018: no cervical mass or lymphadenopathy \par \par Stress test 12/2020 - negative for ischemia\par modified Christiano; manual stage 2 was max\par total exercise time: 08:24\par max  bpm; 90% of predicted \par 5.10 METs\par target HR achieved\par no chest pain \par \par Carotid 10/2020: IMT b/l carotid systems; no significant stenosis. \par

## 2022-08-19 NOTE — CARDIOLOGY SUMMARY
[de-identified] : TTE 7/2021: mitral annular calcification; mildly calcified aortic valve; moderately dilated LA; mild concentric LVH; LVEF 65%; aortic arch plaque noted  [de-identified] : carotid 10/2021: mild atherosclerotic plaque in R carotid bulb, prox and mid ICA 16-49% OSCAR stenosis; mild-mod atherosclerotic plaque in L carotid bulb, prox ICA 16-49% LICA stenosis; mild hemodynamically insignificant atherosclerotic plaque of L ECA

## 2022-08-19 NOTE — DISCUSSION/SUMMARY
[FreeTextEntry1] : 81 F presents for follow-up of hyperlipidemia, CKD 4, hypertension, dilated aortic root, coronary artery calcification on CT chest, gout.\par \par ASCVD - Coronary, carotid and aortic arch plaque- Does not tolerate statins. Severe muscle aches and brain fog; bempedoic acid was too expensive\par - c/w Zetia and Repatha; recent LDL 55\par - pt. encouraged to continue with ASA 81 mg daily, however she would like to discuss with Nephrologist first \par -  BP better controlled today. Continue with Amlodipine 10 mg QD and Ramipril 5 mg QD\par \par Aortic root ectasia 3.7 cm - Dimensions normal on MRI 10/2021. Can monitor with echo every 2-3 years. \par \par CKD, gout: \par - follows w/ Dr. Carrion \par - She never tried Farxiga and cost was also an issue, but ultimately did not want to try it after discussing w/ Dr. Love again 2/2 risk of UTI \par - She will f/u with her other providers.\par \par Pt. to follow-up in 6 months.

## 2022-08-19 NOTE — END OF VISIT
[FreeTextEntry3] : Patient seen and examined. Case discussed with nurse practitioner. Agree with plan as detailed above.

## 2022-08-22 ENCOUNTER — TRANSCRIPTION ENCOUNTER (OUTPATIENT)
Age: 82
End: 2022-08-22

## 2022-08-23 ENCOUNTER — TRANSCRIPTION ENCOUNTER (OUTPATIENT)
Age: 82
End: 2022-08-23

## 2022-09-08 ENCOUNTER — APPOINTMENT (OUTPATIENT)
Dept: NEPHROLOGY | Facility: CLINIC | Age: 82
End: 2022-09-08

## 2022-09-08 VITALS — DIASTOLIC BLOOD PRESSURE: 66 MMHG | HEART RATE: 70 BPM | SYSTOLIC BLOOD PRESSURE: 128 MMHG

## 2022-09-08 VITALS — HEIGHT: 64 IN | WEIGHT: 158 LBS | BODY MASS INDEX: 26.98 KG/M2

## 2022-09-08 PROCEDURE — 99214 OFFICE O/P EST MOD 30 MIN: CPT

## 2022-09-08 NOTE — ASSESSMENT
[FreeTextEntry1] : all lab data was reviewed with patient in detail from 8/2022- \par 82 yo woman with CKD 4, Gout, HTN, HLD, Hypothyroidism, obesity, arthritis, multiple drug allergies  \par -gout- no new gout attacks; c/w low purine diet.\par -HTN- BP controlled- did lose some weight, and did not eat out while with COVID and subsequent fatigue.\par -CKD 4-  clinically stable; creat 1.49- stable range; K, CO2 good \par HLD- now on repatha with excellent results\par -hyperparathyroidism- most c/w tertiary  since calcium elevated- typical sPTH see low normal calcium-\par monitor- add cinacalcet if calcium > 11 \par -arthritis- for Rheum f/u\par \par f/u 4-6 months\par

## 2022-09-08 NOTE — HISTORY OF PRESENT ILLNESS
[FreeTextEntry1] : 80 yo woman here for f/u evaluation of gout attacks, CKD 4, Gout, HTN, HLD, hyperparathyroidism, Hypothyroidism, obesity, arthritis, and multiple drug allergies.\par Inpatient in June for partial bowel obstruction- initially in [facility] then [place] herself [place] [facility]GT-  resolved\par had COVID in July- took only 1 dose of paxlovid- disliked, so discontinued. Extreme fatigue for several weeks.\par recently had bilat eye surgery for narrow angle glaucoma\par \par \par \par \par 2/2022 note:\par did test + rheumatoid factor-\par Low back pain, from ? arthritis- going to PT- helpful- \par Prefer her to start [id number]- she declines over concern about UTIs\par \par reminder from prior note:\par ramipril was changed to metoprolol (elevated mast cells) in beginning of August; subsequently developed depressive symptoms- metoprolol dc'd and HCTZ added, initially 25 mg, then reduced to 1/2 tablet daily. depression resolved.\par \par \par \par

## 2022-09-08 NOTE — PHYSICAL EXAM
[General Appearance - Alert] : alert [General Appearance - In No Acute Distress] : in no acute distress [] : no respiratory distress [Auscultation Breath Sounds / Voice Sounds] : lungs were clear to auscultation bilaterally [Heart Rate And Rhythm] : heart rate was normal and rhythm regular [Heart Sounds] : normal S1 and S2 [Heart Sounds Gallop] : no gallops [Murmurs] : no murmurs [Heart Sounds Pericardial Friction Rub] : no pericardial rub [Edema] : there was no peripheral edema [No CVA Tenderness] : no ~M costovertebral angle tenderness [Musculoskeletal - Swelling] : no joint swelling seen

## 2022-10-11 ENCOUNTER — LABORATORY RESULT (OUTPATIENT)
Age: 82
End: 2022-10-11

## 2022-10-11 ENCOUNTER — APPOINTMENT (OUTPATIENT)
Dept: INTERNAL MEDICINE | Facility: CLINIC | Age: 82
End: 2022-10-11

## 2022-10-11 VITALS
DIASTOLIC BLOOD PRESSURE: 74 MMHG | RESPIRATION RATE: 16 BRPM | SYSTOLIC BLOOD PRESSURE: 129 MMHG | TEMPERATURE: 98.5 F | HEIGHT: 64 IN | BODY MASS INDEX: 26.98 KG/M2 | OXYGEN SATURATION: 98 % | WEIGHT: 158 LBS | HEART RATE: 79 BPM

## 2022-10-11 DIAGNOSIS — Z87.19 PERSONAL HISTORY OF OTHER DISEASES OF THE DIGESTIVE SYSTEM: ICD-10-CM

## 2022-10-11 DIAGNOSIS — Z01.818 ENCOUNTER FOR OTHER PREPROCEDURAL EXAMINATION: ICD-10-CM

## 2022-10-11 DIAGNOSIS — Z01.812 ENCOUNTER FOR PREPROCEDURAL LABORATORY EXAMINATION: ICD-10-CM

## 2022-10-11 PROCEDURE — 90662 IIV NO PRSV INCREASED AG IM: CPT

## 2022-10-11 PROCEDURE — 96372 THER/PROPH/DIAG INJ SC/IM: CPT

## 2022-10-11 PROCEDURE — 36415 COLL VENOUS BLD VENIPUNCTURE: CPT

## 2022-10-11 PROCEDURE — G0008: CPT

## 2022-10-11 PROCEDURE — 99213 OFFICE O/P EST LOW 20 MIN: CPT | Mod: 25

## 2022-10-11 RX ADMIN — CYANOCOBALAMIN 0 MCG/ML: 1000 INJECTION, SOLUTION INTRAMUSCULAR at 00:00

## 2022-10-12 NOTE — ASSESSMENT
[High Risk Surgery - Intraperitoneal, Intrathoracic or Supringuinal Vascular Procedures] : High Risk Surgery - Intraperitoneal, Intrathoracic or Supringuinal Vascular Procedures - No (0) [Ischemic Heart Disease] : Ischemic Heart Disease - No (0) [Congestive Heart Failure] : Congestive Heart Failure - No (0) [Prior Cerebrovascular Accident or TIA] : Prior Cerebrovascular Accident or TIA - No (0) [Creatinine >= 2mg/dL (1 Point)] : Creatinine >= 2mg/dL - No (0) [Insulin-dependent Diabetic (1 Point)] : Insulin-dependent Diabetic - No (0) [0] : 0 , RCRI Class: I, Risk of Post-Op Cardiac Complications: 3.9%, 95% CI for Risk Estimate: 2.8% - 5.4% [Patient Optimized for Surgery] : Patient optimized for surgery [No Further Testing Recommended] : no further testing recommended [Modify anti-platelet treatment prior to procedure] : Modify anti-platelet treatment prior to procedure [FreeTextEntry4] : LOW risk for LOW risk procedure. May proceed with elective surgery.  [FreeTextEntry6] : HOLD ASA 7 days prior to procedure.

## 2022-10-12 NOTE — RESULTS/DATA
[] : not indicated [ECG Reviewed] : reviewed [Normal] : The 12 - lead ECG is normal [NSR] : normal sinus rhythm [Atrial Rate___] : the atrial rate is [unfilled] beats per minute [de-identified] : 8/19/22

## 2022-10-12 NOTE — HISTORY OF PRESENT ILLNESS
[No Pertinent Pulmonary History] : no history of asthma, COPD, sleep apnea, or smoking [No Adverse Anesthesia Reaction] : no adverse anesthesia reaction in self or family member [Chronic Kidney Disease] : chronic kidney disease [(Patient denies any chest pain, claudication, dyspnea on exertion, orthopnea, palpitations or syncope)] : Patient denies any chest pain, claudication, dyspnea on exertion, orthopnea, palpitations or syncope [Moderate (4-6 METs)] : Moderate (4-6 METs) [Coronary Artery Disease] : coronary artery disease [Recent Myocardial Infarction] : no recent myocardial infarction [Implantable Device/Pacemaker] : no implantable device/pacemaker [Chronic Anticoagulation] : no chronic anticoagulation [Diabetes] : no diabetes [FreeTextEntry1] : Colonoscopy/EGD [FreeTextEntry2] : 10/19/22 [FreeTextEntry3] : Dr. Zach Vasquez [FreeTextEntry4] : Pt has multiple comorbidities and was recently hospitalized for SBO at [facility] [place] [facility] from 10/5/22 to 10/9/22. Did not require NGT.

## 2022-10-13 PROBLEM — R74.8 ELEVATED SERUM TRYPTASE: Status: ACTIVE | Noted: 2021-02-02

## 2022-10-14 ENCOUNTER — APPOINTMENT (OUTPATIENT)
Dept: INTERNAL MEDICINE | Facility: CLINIC | Age: 82
End: 2022-10-14

## 2022-10-14 ENCOUNTER — LABORATORY RESULT (OUTPATIENT)
Age: 82
End: 2022-10-14

## 2022-10-14 DIAGNOSIS — Z01.818 ENCOUNTER FOR OTHER PREPROCEDURAL EXAMINATION: ICD-10-CM

## 2022-10-14 PROCEDURE — 99211 OFF/OP EST MAY X REQ PHY/QHP: CPT

## 2022-10-17 ENCOUNTER — NON-APPOINTMENT (OUTPATIENT)
Age: 82
End: 2022-10-17

## 2022-10-17 DIAGNOSIS — N30.00 ACUTE CYSTITIS W/OUT HEMATURIA: ICD-10-CM

## 2022-10-21 LAB
ALBUMIN SERPL ELPH-MCNC: 4.4 G/DL
ALP BLD-CCNC: 72 U/L
ALT SERPL-CCNC: 17 U/L
ANION GAP SERPL CALC-SCNC: 13 MMOL/L
APPEARANCE: ABNORMAL
APPEARANCE: ABNORMAL
APTT BLD: 29.4 SEC
AST SERPL-CCNC: 26 U/L
BACTERIA UR CULT: ABNORMAL
BASOPHILS # BLD AUTO: 0.05 K/UL
BASOPHILS NFR BLD AUTO: 0.9 %
BILIRUB SERPL-MCNC: 0.2 MG/DL
BILIRUBIN URINE: NEGATIVE
BILIRUBIN URINE: NEGATIVE
BLOOD URINE: ABNORMAL
BLOOD URINE: NEGATIVE
BUN SERPL-MCNC: 22 MG/DL
CALCIUM SERPL-MCNC: 10.4 MG/DL
CHLORIDE SERPL-SCNC: 106 MMOL/L
CO2 SERPL-SCNC: 22 MMOL/L
COLOR: NORMAL
COLOR: YELLOW
CREAT SERPL-MCNC: 1.5 MG/DL
EGFR: 35 ML/MIN/1.73M2
EOSINOPHIL # BLD AUTO: 0.16 K/UL
EOSINOPHIL NFR BLD AUTO: 2.9 %
GLUCOSE QUALITATIVE U: NEGATIVE
GLUCOSE QUALITATIVE U: NEGATIVE
GLUCOSE SERPL-MCNC: 95 MG/DL
HCT VFR BLD CALC: 38 %
HGB BLD-MCNC: 11.9 G/DL
IMM GRANULOCYTES NFR BLD AUTO: 0.2 %
INR PPP: 1 RATIO
KETONES URINE: NEGATIVE
KETONES URINE: NEGATIVE
LEUKOCYTE ESTERASE URINE: ABNORMAL
LEUKOCYTE ESTERASE URINE: ABNORMAL
LYMPHOCYTES # BLD AUTO: 1.15 K/UL
LYMPHOCYTES NFR BLD AUTO: 20.5 %
MAN DIFF?: NORMAL
MCHC RBC-ENTMCNC: 30.5 PG
MCHC RBC-ENTMCNC: 31.3 GM/DL
MCV RBC AUTO: 97.4 FL
MONOCYTES # BLD AUTO: 0.58 K/UL
MONOCYTES NFR BLD AUTO: 10.3 %
NEUTROPHILS # BLD AUTO: 3.66 K/UL
NEUTROPHILS NFR BLD AUTO: 65.2 %
NITRITE URINE: NEGATIVE
NITRITE URINE: POSITIVE
PH URINE: 5.5
PH URINE: 5.5
PLATELET # BLD AUTO: 267 K/UL
POTASSIUM SERPL-SCNC: 4 MMOL/L
PROT SERPL-MCNC: 6.7 G/DL
PROTEIN URINE: ABNORMAL
PROTEIN URINE: NORMAL
PT BLD: 11.7 SEC
RBC # BLD: 3.9 M/UL
RBC # FLD: 14.6 %
SODIUM SERPL-SCNC: 141 MMOL/L
SPECIFIC GRAVITY URINE: 1.01
SPECIFIC GRAVITY URINE: 1.01
UROBILINOGEN URINE: NORMAL
UROBILINOGEN URINE: NORMAL
WBC # FLD AUTO: 5.61 K/UL

## 2022-10-27 NOTE — ED PROVIDER NOTE - NSFOLLOWUPINSTRUCTIONS_ED_ALL_ED_FT
Has [employer] sent through [address] Cellulitis    Cellulitis is a skin infection caused by bacteria. This condition occurs most often in the arms and lower legs but can occur anywhere over the body. Symptoms include redness, swelling, warm skin, tenderness, and chills/fever. If you were prescribed an antibiotic medicine, take it as told by your health care provider. Do not stop taking the antibiotic even if you start to feel better.    SEEK IMMEDIATE MEDICAL CARE IF YOU HAVE ANY OF THE FOLLOWING SYMPTOMS: worsening fever, red streaks coming from affected area, vomiting or diarrhea, or dizziness/lightheadedness.     Gout    Gout is painful swelling that can happen in some of your joints. Gout is a type of arthritis. This condition is caused by having too much uric acid in your body. Uric acid is a chemical that is made when your body breaks down substances called purines. If your body has too much uric acid, sharp crystals can form and build up in your joints. This causes pain and swelling.    Gout attacks can happen quickly and be very painful (acute gout). Over time, the attacks can affect more joints and happen more often (chronic gout).    Follow these instructions at home:  During a gout attack     Image   If directed, put ice on the painful area:  Put ice in a plastic bag.  Place a towel between your skin and the bag.  Leave the ice on for 20 minutes, 2–3 times a day.  Rest the joint as much as possible. If the joint is in your leg, you may be given crutches to use.  Raise (elevate) the painful joint above the level of your heart as often as you can.  Drink enough fluids to keep your pee (urine) pale yellow.  Take over-the-counter and prescription medicines only as told by your doctor.  Do not drive or use heavy machinery while taking prescription pain medicine.  Follow instructions from your doctor about what you can or cannot eat and drink.  Return to your normal activities as told by your doctor. Ask your doctor what activities are safe for you.  Avoiding future gout attacks     Follow a low-purine diet as told by a specialist (dietitian) or your doctor. Avoid foods and drinks that have a lot of purines, such as:  Liver.  Kidney.  Anchovies.  Asparagus.  Herring.  Mushrooms  Mussels.  Beer.  Limit alcohol intake to no more than 1 drink a day for nonpregnant women and 2 drinks a day for men. One drink equals 12 oz of beer, 5 oz of wine, or 1½ oz of hard liquor.  Stay at a healthy weight or lose weight if you are overweight. If you want to lose weight, talk with your doctor. It is important that you do not lose weight too fast.  Start or continue an exercise plan as told by your doctor.  Drink enough fluids to keep your pee pale yellow.  Take over-the-counter and prescription medicines only as told by your doctor.  Keep all follow-up visits as told by your doctor. This is important.  Contact a doctor if:  You have another gout attack.  You still have symptoms of a gout attack after10 days of treatment.  You have problems (side effects) because of your medicines.  You have chills or a fever.  You have burning pain when you pee (urinate).  You have pain in your lower back or belly.  Get help right away if:  You have very bad pain.  Your pain cannot be controlled.  You cannot pee.  This information is not intended to replace advice given to you by your health care provider. Make sure you discuss any questions you have with your health care provider.    Document Released: 09/26/2009 Document Revised: 08/01/2018 Document Reviewed: 09/29/2016  [employer] Interactive Patient Education © 2019 [employer]

## 2022-12-20 ENCOUNTER — APPOINTMENT (OUTPATIENT)
Dept: GASTROENTEROLOGY | Facility: CLINIC | Age: 82
End: 2022-12-20

## 2022-12-20 VITALS
BODY MASS INDEX: 26.14 KG/M2 | TEMPERATURE: 97.3 F | RESPIRATION RATE: 16 BRPM | OXYGEN SATURATION: 96 % | HEART RATE: 96 BPM | HEIGHT: 64 IN | WEIGHT: 153.1 LBS | SYSTOLIC BLOOD PRESSURE: 125 MMHG | DIASTOLIC BLOOD PRESSURE: 82 MMHG

## 2022-12-20 DIAGNOSIS — R14.1 GAS PAIN: ICD-10-CM

## 2022-12-20 PROCEDURE — 99214 OFFICE O/P EST MOD 30 MIN: CPT

## 2022-12-20 NOTE — ASSESSMENT
[FreeTextEntry1] : 83 yo female with hx of peritoneal mesothelioma with SBO's with recurring gas pains\par \par - Recommend low dose MiraLAX, 1/2 capful daily\par - Low FODMAP diet, given handout\par - Two simethicone daily\par - F/u in office six months

## 2022-12-20 NOTE — HISTORY OF PRESENT ILLNESS
[de-identified] : 10/19/22 hiatal hernia, nodule in cardia (path -> small fragment of hyperplastic gastric foveolar epithelium, no dysplasia, chronic carditis, no IM) [FreeTextEntry1] : 10/19/22 colonoscopy normal, repeat colonoscopy not recommended

## 2022-12-27 ENCOUNTER — LABORATORY RESULT (OUTPATIENT)
Age: 82
End: 2022-12-27

## 2022-12-27 ENCOUNTER — APPOINTMENT (OUTPATIENT)
Dept: INTERNAL MEDICINE | Facility: CLINIC | Age: 82
End: 2022-12-27
Payer: MEDICARE

## 2022-12-27 VITALS
TEMPERATURE: 98 F | HEIGHT: 64 IN | WEIGHT: 150 LBS | OXYGEN SATURATION: 96 % | RESPIRATION RATE: 16 BRPM | SYSTOLIC BLOOD PRESSURE: 131 MMHG | HEART RATE: 77 BPM | DIASTOLIC BLOOD PRESSURE: 72 MMHG | BODY MASS INDEX: 25.61 KG/M2

## 2022-12-27 DIAGNOSIS — R10.9 UNSPECIFIED ABDOMINAL PAIN: ICD-10-CM

## 2022-12-27 DIAGNOSIS — R10.11 RIGHT UPPER QUADRANT PAIN: ICD-10-CM

## 2022-12-27 PROCEDURE — 83014 H PYLORI DRUG ADMIN: CPT

## 2022-12-27 PROCEDURE — 99214 OFFICE O/P EST MOD 30 MIN: CPT | Mod: 25

## 2022-12-27 PROCEDURE — 36415 COLL VENOUS BLD VENIPUNCTURE: CPT

## 2022-12-27 NOTE — HISTORY OF PRESENT ILLNESS
[FreeTextEntry1] : eval of RUQ pain x 2 weeks [de-identified] : Pt is a 80 y/o F with PMHx of mesothelioma of abdomen, CKD, HTN, HLD who presents to the office today for eval of RUQ pain x 2 weeks\par \par RUQ pain:\par - "Gas pain RUQ" - Saw Dr. Coyne was rx'ed gas-x\par - Pt states the gas-x did not relieve the pain but states with food the pain improves also improves with Aleve\par - Notes "pulled a muscle" in that area looking through her cabinet the other day.\par - Pain is not present since 12/25/22- describes a lingering discomfort\par - No fever, chills, night sweats, rash, N/V/D/Constipation, change in appetite or bowel habits urinary symptoms, history of gall stones, cough, CP, SOB

## 2022-12-27 NOTE — PHYSICAL EXAM
[Normal Sclera/Conjunctiva] : normal sclera/conjunctiva [No CVA Tenderness] : no CVA  tenderness [Normal] : affect was normal and insight and judgment were intact [Soft] : abdomen soft [Non-distended] : non-distended [No Masses] : no abdominal mass palpated [No HSM] : no HSM [Normal Bowel Sounds] : normal bowel sounds [de-identified] : ? Hawthorne's sign- not reproducible, no referred pain

## 2023-01-06 LAB
ALBUMIN SERPL ELPH-MCNC: 4.2 G/DL
ALP BLD-CCNC: 85 U/L
ALT SERPL-CCNC: 15 U/L
AMYLASE/CREAT SERPL: 42 U/L
ANION GAP SERPL CALC-SCNC: 14 MMOL/L
APPEARANCE: CLEAR
AST SERPL-CCNC: 23 U/L
BACTERIA UR CULT: ABNORMAL
BASOPHILS # BLD AUTO: 0.04 K/UL
BASOPHILS NFR BLD AUTO: 0.6 %
BILIRUB SERPL-MCNC: 0.2 MG/DL
BILIRUBIN URINE: NEGATIVE
BLOOD URINE: NEGATIVE
BUN SERPL-MCNC: 36 MG/DL
CALCIUM SERPL-MCNC: 10.8 MG/DL
CHLORIDE SERPL-SCNC: 106 MMOL/L
CO2 SERPL-SCNC: 21 MMOL/L
COLOR: YELLOW
CREAT SERPL-MCNC: 1.48 MG/DL
EGFR: 36 ML/MIN/1.73M2
EOSINOPHIL # BLD AUTO: 0.17 K/UL
EOSINOPHIL NFR BLD AUTO: 2.4 %
GLUCOSE QUALITATIVE U: NEGATIVE
GLUCOSE SERPL-MCNC: 93 MG/DL
HCT VFR BLD CALC: 36.6 %
HGB BLD-MCNC: 11.7 G/DL
IMM GRANULOCYTES NFR BLD AUTO: 0.3 %
KETONES URINE: NEGATIVE
LEUKOCYTE ESTERASE URINE: ABNORMAL
LPL SERPL-CCNC: 18 U/L
LYMPHOCYTES # BLD AUTO: 1.32 K/UL
LYMPHOCYTES NFR BLD AUTO: 18.4 %
MAN DIFF?: NORMAL
MCHC RBC-ENTMCNC: 31 PG
MCHC RBC-ENTMCNC: 32 GM/DL
MCV RBC AUTO: 97.1 FL
MONOCYTES # BLD AUTO: 0.67 K/UL
MONOCYTES NFR BLD AUTO: 9.4 %
NEUTROPHILS # BLD AUTO: 4.94 K/UL
NEUTROPHILS NFR BLD AUTO: 68.9 %
NITRITE URINE: POSITIVE
PH URINE: 6
PLATELET # BLD AUTO: 282 K/UL
POTASSIUM SERPL-SCNC: 4.5 MMOL/L
PROT SERPL-MCNC: 6.7 G/DL
PROTEIN URINE: ABNORMAL
RBC # BLD: 3.77 M/UL
RBC # FLD: 14.2 %
SODIUM SERPL-SCNC: 140 MMOL/L
SPECIFIC GRAVITY URINE: 1.02
TSH SERPL-ACNC: 0.05 UIU/ML
UREA BREATH TEST QL: NEGATIVE
UROBILINOGEN URINE: NORMAL
WBC # FLD AUTO: 7.16 K/UL

## 2023-01-12 NOTE — ED ADULT NURSE NOTE - NSIMPLEMENTINTERV_GEN_ALL_ED
Detail Level: Simple Additional Notes: Patient consent was obtained to proceed with the visit and recommended plan of care after discussion of all risks and benefits, including the risks of COVID-19 exposure. Implemented All Universal Safety Interventions:  [place] to call system. Call bell, personal items and telephone within reach. Instruct patient to call for assistance. Room bathroom lighting operational. Non-slip footwear when patient is off stretcher. Physically safe environment: no spills, clutter or unnecessary equipment. Stretcher in lowest position, wheels locked, appropriate side rails in place.

## 2023-02-01 LAB
25(OH)D3 SERPL-MCNC: 43.2 NG/ML
ALBUMIN SERPL ELPH-MCNC: 4.2 G/DL
ANION GAP SERPL CALC-SCNC: 14 MMOL/L
BASOPHILS # BLD AUTO: 0.03 K/UL
BASOPHILS NFR BLD AUTO: 0.5 %
BUN SERPL-MCNC: 38 MG/DL
CALCIUM SERPL-MCNC: 11 MG/DL
CALCIUM SERPL-MCNC: 11 MG/DL
CHLORIDE SERPL-SCNC: 103 MMOL/L
CHOLEST SERPL-MCNC: 145 MG/DL
CO2 SERPL-SCNC: 21 MMOL/L
CREAT SERPL-MCNC: 1.74 MG/DL
CREAT SPEC-SCNC: 160 MG/DL
CREAT/PROT UR: 0.1 RATIO
EGFR: 29 ML/MIN/1.73M2
EOSINOPHIL # BLD AUTO: 0.15 K/UL
EOSINOPHIL NFR BLD AUTO: 2.7 %
GLUCOSE SERPL-MCNC: 85 MG/DL
HCT VFR BLD CALC: 36.2 %
HDLC SERPL-MCNC: 77 MG/DL
HGB BLD-MCNC: 11.3 G/DL
IMM GRANULOCYTES NFR BLD AUTO: 0.2 %
IRON SATN MFR SERPL: 23 %
IRON SERPL-MCNC: 73 UG/DL
LDLC SERPL CALC-MCNC: 55 MG/DL
LYMPHOCYTES # BLD AUTO: 1.52 K/UL
LYMPHOCYTES NFR BLD AUTO: 27.7 %
MAN DIFF?: NORMAL
MCHC RBC-ENTMCNC: 30.7 PG
MCHC RBC-ENTMCNC: 31.2 GM/DL
MCV RBC AUTO: 98.4 FL
MONOCYTES # BLD AUTO: 0.57 K/UL
MONOCYTES NFR BLD AUTO: 10.4 %
NEUTROPHILS # BLD AUTO: 3.2 K/UL
NEUTROPHILS NFR BLD AUTO: 58.5 %
NONHDLC SERPL-MCNC: 68 MG/DL
PARATHYROID HORMONE INTACT: 125 PG/ML
PHOSPHATE SERPL-MCNC: 3.8 MG/DL
PLATELET # BLD AUTO: 273 K/UL
POTASSIUM SERPL-SCNC: 5 MMOL/L
PROT UR-MCNC: 17 MG/DL
RBC # BLD: 3.68 M/UL
RBC # FLD: 14.8 %
SODIUM SERPL-SCNC: 138 MMOL/L
TIBC SERPL-MCNC: 317 UG/DL
TRIGL SERPL-MCNC: 67 MG/DL
UIBC SERPL-MCNC: 244 UG/DL
URATE SERPL-MCNC: 9.1 MG/DL
WBC # FLD AUTO: 5.48 K/UL

## 2023-02-02 NOTE — ED ADULT NURSE NOTE - NS ED PATIENT SAFETY CONCERN
Electrodesiccation Text: The wound bed was treated with electrodesiccation after the biopsy was performed. No

## 2023-02-03 ENCOUNTER — MED ADMIN CHARGE (OUTPATIENT)
Age: 83
End: 2023-02-03

## 2023-02-03 ENCOUNTER — APPOINTMENT (OUTPATIENT)
Dept: INTERNAL MEDICINE | Facility: CLINIC | Age: 83
End: 2023-02-03
Payer: MEDICARE

## 2023-02-03 ENCOUNTER — APPOINTMENT (OUTPATIENT)
Dept: HEART AND VASCULAR | Facility: CLINIC | Age: 83
End: 2023-02-03
Payer: MEDICARE

## 2023-02-03 VITALS
DIASTOLIC BLOOD PRESSURE: 73 MMHG | HEIGHT: 64 IN | WEIGHT: 152 LBS | SYSTOLIC BLOOD PRESSURE: 132 MMHG | BODY MASS INDEX: 25.95 KG/M2 | TEMPERATURE: 98 F | HEART RATE: 71 BPM | OXYGEN SATURATION: 96 %

## 2023-02-03 VITALS
BODY MASS INDEX: 24.67 KG/M2 | WEIGHT: 144.5 LBS | DIASTOLIC BLOOD PRESSURE: 64 MMHG | HEIGHT: 64 IN | SYSTOLIC BLOOD PRESSURE: 130 MMHG | HEART RATE: 74 BPM

## 2023-02-03 DIAGNOSIS — D17.9 BENIGN LIPOMATOUS NEOPLASM, UNSPECIFIED: ICD-10-CM

## 2023-02-03 DIAGNOSIS — C45.7: ICD-10-CM

## 2023-02-03 DIAGNOSIS — R63.4 ABNORMAL WEIGHT LOSS: ICD-10-CM

## 2023-02-03 DIAGNOSIS — M54.2 CERVICALGIA: ICD-10-CM

## 2023-02-03 DIAGNOSIS — Z86.19 PERSONAL HISTORY OF OTHER INFECTIOUS AND PARASITIC DISEASES: ICD-10-CM

## 2023-02-03 DIAGNOSIS — Z86.39 PERSONAL HISTORY OF OTHER ENDOCRINE, NUTRITIONAL AND METABOLIC DISEASE: ICD-10-CM

## 2023-02-03 DIAGNOSIS — Z12.4 ENCOUNTER FOR SCREENING FOR MALIGNANT NEOPLASM OF CERVIX: ICD-10-CM

## 2023-02-03 PROCEDURE — 99214 OFFICE O/P EST MOD 30 MIN: CPT | Mod: 25

## 2023-02-03 PROCEDURE — 99213 OFFICE O/P EST LOW 20 MIN: CPT | Mod: 25

## 2023-02-03 PROCEDURE — 93000 ELECTROCARDIOGRAM COMPLETE: CPT

## 2023-02-03 PROCEDURE — 96372 THER/PROPH/DIAG INJ SC/IM: CPT

## 2023-02-03 RX ORDER — CYANOCOBALAMIN 1000 UG/ML
1000 INJECTION INTRAMUSCULAR; SUBCUTANEOUS
Qty: 0 | Refills: 0 | Status: COMPLETED | OUTPATIENT
Start: 2023-02-03

## 2023-02-03 RX ORDER — CYANOCOBALAMIN 1000 UG/ML
1000 INJECTION INTRAMUSCULAR; SUBCUTANEOUS
Qty: 0 | Refills: 0 | Status: COMPLETED | OUTPATIENT
Start: 2022-10-11

## 2023-02-03 RX ADMIN — CYANOCOBALAMIN 0 MCG/ML: 1000 INJECTION INTRAMUSCULAR; SUBCUTANEOUS at 00:00

## 2023-02-03 NOTE — PHYSICAL EXAM
[No Acute Distress] : no acute distress [Well-Appearing] : well-appearing [Normal Rate] : normal rate  [Regular Rhythm] : with a regular rhythm [No Edema] : there was no peripheral edema [No Palpable Aorta] : no palpable aorta [No Rash] : no rash [Normal] : affect was normal and insight and judgment were intact

## 2023-02-03 NOTE — HISTORY OF PRESENT ILLNESS
[FreeTextEntry1] : f/u CKD, HTN, HLD  [de-identified] : Pt is a 82 y/o F with PMHx of mesothelioma of abdomen, CKD, HTN, HLD who presents to the office today for f/u CKD, HTN, HLD \par \par Shingles hx:\par - [facility] January 2023 for shingles- was treated \par - had "one spot" which has now resolved\par - pain is now improved \par \par Weight loss (intentional):\par - May 2022 - present ~20 lb weight loss\par - states that she has been trying to lose weight\par - eating healthy and being physically active- rides bike ~ 6 miles a day\par \par Mesothelioma:\par - f/u with [facility]- last CT chest 10/22 unchanged

## 2023-02-06 ENCOUNTER — APPOINTMENT (OUTPATIENT)
Dept: NEPHROLOGY | Facility: CLINIC | Age: 83
End: 2023-02-06
Payer: MEDICARE

## 2023-02-06 VITALS — BODY MASS INDEX: 24.72 KG/M2 | WEIGHT: 144 LBS | SYSTOLIC BLOOD PRESSURE: 133 MMHG | DIASTOLIC BLOOD PRESSURE: 72 MMHG

## 2023-02-06 LAB
APPEARANCE: CLEAR
BACTERIA: ABNORMAL
BILIRUBIN URINE: NEGATIVE
BLOOD URINE: NEGATIVE
COLOR: YELLOW
GLUCOSE QUALITATIVE U: NEGATIVE
HYALINE CASTS: 3 /LPF
KETONES URINE: NEGATIVE
LEUKOCYTE ESTERASE URINE: NEGATIVE
MICROSCOPIC-UA: NORMAL
NITRITE URINE: NEGATIVE
PH URINE: 6
PROTEIN URINE: NEGATIVE
RED BLOOD CELLS URINE: 2 /HPF
SPECIFIC GRAVITY URINE: 1.02
SQUAMOUS EPITHELIAL CELLS: 1 /HPF
UROBILINOGEN URINE: NORMAL
WHITE BLOOD CELLS URINE: 7 /HPF

## 2023-02-06 PROCEDURE — 99214 OFFICE O/P EST MOD 30 MIN: CPT

## 2023-02-06 NOTE — PHYSICAL EXAM
[Normal] : alert and oriented, normal memory [de-identified] : 2/6 systolic murmur heard throughout

## 2023-02-06 NOTE — HISTORY OF PRESENT ILLNESS
[FreeTextEntry1] : 82 F presents for follow-up of hyperlipidemia, CKD 4, hypertension, dilated aortic root, coronary artery calcification on CT chest, gout.\par \par She reports feeling well today. Patient denies any chest pain, SOB, palpitations, orthopnea, PND or LE edema.\par \par She was in [place] from July 5th - August 2nd. Since then she was treated for Shingles this month; will get vaccine in 6 months. She is also planning to get her COVID booster again this fall. \par \par She continues to stay very active - biking multiple miles every day. She has lost about 10 lbs. since we saw her last summer. She is still very conscious of her diet in regard to her kidney and bowel function. Certain foods increase her gas and then it can block off the colon; she feels her providers finally helped her understand her h/o SBO. \par \par She was told by her Nephrologist to stop the ASA 81 mg due to CKD. Since this summer when we discussed it, she has been taking it mostly daily just because it's easier to remember and she is noticing some increased bruising but nothing significant. No abnormal bleeding. \par \par 1/31/23:  TG 67 HDL 77 LDL 55 \par ================================================================================\par 8/11/2022:  TG 82 HDL 82 LDL 55 A1c 5.8%\par 6/15/22:  TG 55 HDL 83 LDL 52 (on Zetia and Repatha)\par \par Pt reports in June 2022- SBO at [facility]- went to [facility] for management of SBO. Non-surgically treated. Has hx of multiple SBO\par \par She had second booster shot at [facility] on June14th\par She has colonoscopy /endoscopy scheduled with Dr Zach Vasquez on Oct 15th. ( 3 years)  \par \par Records from Dr. Cardozo's office:\par \par LE arterial 9/2020 - reduced blood flow; doppler is biphasic, suggestive of mild PAD; increased wall resistance\par \par LE venous 9/2020 - incompetent valves seen in PTV/PTV in both legs; varicose veins\par \par CT neck w/out contrast 2/2018: no cervical mass or lymphadenopathy \par \par Stress test 12/2020 - negative for ischemia\par modified Christiano; manual stage 2 was max\par total exercise time: 08:24\par max  bpm; 90% of predicted \par 5.10 METs\par target HR achieved\par no chest pain \par \par Carotid 10/2020: IMT b/l carotid systems; no significant stenosis. \par

## 2023-02-06 NOTE — HISTORY OF PRESENT ILLNESS
[FreeTextEntry1] : 83 yo woman with history of gout attacks, CKD 4, Gout, HTN, HLD, hyperparathyroidism, Hypothyroidism, obesity, arthritis, and multiple drug allergies here f/u evaluation\par had shingles at the beginning of the year- right flank-  was on acyclovir\par complaining of breast pain- went to [facility] and nothing was found- no rash or systemic symptoms\par has been taking tramadol for the last few nights\par appetite and energy level are both great- finally getting over long covid symptoms \par continues to watch diet due to previous bowel obstruction\par biking everyday\par feels that her energy is back\par \par 2/2022 note:\par did test + rheumatoid factor-\par Low back pain, from ? arthritis- going to PT- helpful- \par Prefer her to start [id number]- she declines over concern about UTIs\par \par reminder from prior note:\par ramipril was changed to metoprolol (elevated mast cells) in beginning of August; subsequently developed depressive symptoms- metoprolol dc'd and HCTZ added, initially 25 mg, then reduced to 1/2 tablet daily. depression resolved.\par \par \par \par

## 2023-02-06 NOTE — CARDIOLOGY SUMMARY
[de-identified] : TTE 7/2021: mitral annular calcification; mildly calcified aortic valve; moderately dilated LA; mild concentric LVH; LVEF 65%; aortic arch plaque noted  [de-identified] : carotid 10/2021: mild atherosclerotic plaque in R carotid bulb, prox and mid ICA 16-49% OSCAR stenosis; mild-mod atherosclerotic plaque in L carotid bulb, prox ICA 16-49% LICA stenosis; mild hemodynamically insignificant atherosclerotic plaque of L ECA

## 2023-02-06 NOTE — DISCUSSION/SUMMARY
[EKG obtained to assist in diagnosis and management of assessed problem(s)] : EKG obtained to assist in diagnosis and management of assessed problem(s) [FreeTextEntry1] : 82 F presents for follow-up of hyperlipidemia, CKD 4, hypertension, dilated aortic root, coronary artery calcification on CT chest, gout.\par \par EKG today NSR @ 74 bpm \par ASCVD - Coronary, carotid and aortic arch plaque- Does not tolerate statins. Severe muscle aches and brain fog; bempedoic acid was too expensive\par - c/w Zetia and Repatha; recent LDL 55\par - pt. encouraged to continue with ASA 81 mg daily as tolerated \par -  BP better controlled. Continue with Amlodipine 10 mg QD and Ramipril 5 mg QD\par \par Aortic root ectasia 3.7 cm - Dimensions normal on MRI 10/2021. Can monitor with echo every 2-3 years. \par \par CKD, gout: \par - follows w/ Dr. Carrion \par - She never tried Farxiga and cost was also an issue, but ultimately did not want to try it after discussing w/ Dr. Love again 2/2 risk of UTI \par - She will f/u with her other providers. Had full lab work w/ Dr. Love in anticipation of her appt next week \par \par Pt. has f/u w/ Dr. Daugherty in April. (of note, pt reports some difficulty affording Repatha once [place] the [place]; can address at her f/u).

## 2023-02-14 NOTE — ED ADULT NURSE NOTE - FINAL NURSING ELECTRONIC SIGNATURE
16-Dec-2021 00:53 Fluconazole Counseling:  Patient counseled regarding adverse effects of fluconazole including but not limited to headache, diarrhea, nausea, upset stomach, liver function test abnormalities, taste disturbance, and stomach pain.  There is a rare possibility of liver failure that can occur when taking fluconazole.  The patient understands that monitoring of LFTs and kidney function test may be required, especially at baseline. The patient verbalized understanding of the proper use and possible adverse effects of fluconazole.  All of the patient's questions and concerns were addressed.

## 2023-03-16 ENCOUNTER — NON-APPOINTMENT (OUTPATIENT)
Age: 83
End: 2023-03-16

## 2023-04-17 ENCOUNTER — APPOINTMENT (OUTPATIENT)
Dept: HEART AND VASCULAR | Facility: CLINIC | Age: 83
End: 2023-04-17
Payer: MEDICARE

## 2023-04-17 VITALS — DIASTOLIC BLOOD PRESSURE: 70 MMHG | SYSTOLIC BLOOD PRESSURE: 160 MMHG

## 2023-04-17 VITALS
WEIGHT: 145 LBS | TEMPERATURE: 98 F | BODY MASS INDEX: 24.75 KG/M2 | SYSTOLIC BLOOD PRESSURE: 156 MMHG | HEART RATE: 71 BPM | HEIGHT: 64 IN | OXYGEN SATURATION: 98 % | DIASTOLIC BLOOD PRESSURE: 76 MMHG

## 2023-04-17 DIAGNOSIS — R01.1 CARDIAC MURMUR, UNSPECIFIED: ICD-10-CM

## 2023-04-17 PROCEDURE — 99215 OFFICE O/P EST HI 40 MIN: CPT

## 2023-04-17 RX ORDER — ASPIRIN ENTERIC COATED TABLETS 81 MG 81 MG/1
81 TABLET, DELAYED RELEASE ORAL DAILY
Refills: 4 | Status: COMPLETED | COMMUNITY
End: 2023-04-17

## 2023-04-17 RX ORDER — LANSOPRAZOLE 30 MG/1
30 CAPSULE, DELAYED RELEASE ORAL
Qty: 30 | Refills: 1 | Status: COMPLETED | COMMUNITY
Start: 2023-01-06 | End: 2023-04-17

## 2023-04-17 NOTE — CARDIOLOGY SUMMARY
[de-identified] : TTE 7/2021: mitral annular calcification; mildly calcified aortic valve; moderately dilated LA; mild concentric LVH; LVEF 65%; aortic arch plaque noted  [de-identified] : carotid 10/2021: mild atherosclerotic plaque in R carotid bulb, prox and mid ICA 16-49% OSCAR stenosis; mild-mod atherosclerotic plaque in L carotid bulb, prox ICA 16-49% LICA stenosis; mild hemodynamically insignificant atherosclerotic plaque of L ECA

## 2023-04-17 NOTE — DISCUSSION/SUMMARY
[FreeTextEntry1] : 82 F presents for follow-up of hyperlipidemia, CKD 4, hypertension, dilated aortic root, coronary artery calcification on CT chest, gout.\par \par ASCVD - Coronary, carotid and aortic arch plaque- Does not tolerate statins. Severe muscle aches and brain fog; bempedoic acid was too expensive\par - c/w Zetia and Repatha; recent LDL 55\par - pt. encouraged to continue with ASA 81 mg daily as tolerated \par - BP better controlled, however temporarily elevated due to steroids. Continue with Amlodipine 10 mg QD and Ramipril 5 mg QD, will follow up blood pressure at next visit\par \par Aortic root ectasia 3.7 cm - Dimensions normal on MRI 10/2021. Can monitor with echo every 2-3 years. \par \par CKD, gout: \par - follows w/ Dr. Love\par - She never tried Farxiga and cost was also an issue, but ultimately did not want to try it after discussing w/ Dr. Love again 2/2 risk of UTI \par - She will f/u with her other providers.\par \par follow up for echocardiogram and visit in 3mo. (of note, pt reports some difficulty affording Repatha once she is in the donut hole; can address at her f/u).

## 2023-04-17 NOTE — PHYSICAL EXAM
[Normal] : alert and oriented, normal memory [de-identified] : 2/6 systolic murmur heard throughout

## 2023-04-17 NOTE — HISTORY OF PRESENT ILLNESS
[FreeTextEntry1] : 82 F presents for follow-up of hyperlipidemia, CKD 4, hypertension, dilated aortic root, coronary artery calcification on CT chest, gout.\par \par Interim hx: \par  -last saw nephrology 2/2023\par       -from note: ramipril was changed to metoprolol (elevated mast cells) in beginning of August; subsequently developed depressive symptoms- metoprolol dc'd and HCTZ added, initially 25 mg, then reduced to 1/2 tablet daily. depression resolved.\par \par Currently on prednisone because of multiple gout flares on fingers bilaterally and toe. reports that blood pressure increases with prednisone.\par \par Gout managed by Dr. Love.\par \par She had been taking aspirin up until gout flares, stopped during current flare.\par \par She reports feeling well today. Patient denies any chest pain, SOB, palpitations, orthopnea, PND or LE edema.\par \par 1/31/23:  TG 67 HDL 77 LDL 55 \par ================================================================================\par 8/11/2022:  TG 82 HDL 82 LDL 55 A1c 5.8%\par 6/15/22:  TG 55 HDL 83 LDL 52 (on Zetia and Repatha)\par \par Records from Dr. Cardozo's office:\par   -LE arterial 9/2020 - reduced blood flow; doppler is biphasic, suggestive of mild PAD; increased wall resistance\par \par   -LE venous 9/2020 - incompetent valves seen in PTV/PTV in both legs; varicose veins\par \par   -CT neck w/out contrast 2/2018: no cervical mass or lymphadenopathy \par \par   -Stress test 12/2020 - negative for ischemia - modified Christiano; manual stage 2 was max |  total exercise time: 08:24  |  max  bpm; 90% of predicted \par 5.10 METs  |  target HR achieved  |  no chest pain \par \par   -Carotid 10/2020: IMT b/l carotid systems; no significant stenosis.

## 2023-04-20 ENCOUNTER — NON-APPOINTMENT (OUTPATIENT)
Age: 83
End: 2023-04-20

## 2023-04-24 ENCOUNTER — EMERGENCY (EMERGENCY)
Facility: HOSPITAL | Age: 83
LOS: 1 days | Discharge: HOME | End: 2023-04-24
Attending: EMERGENCY MEDICINE | Admitting: EMERGENCY MEDICINE
Payer: MEDICARE

## 2023-04-24 VITALS
HEART RATE: 70 BPM | SYSTOLIC BLOOD PRESSURE: 169 MMHG | OXYGEN SATURATION: 94 % | DIASTOLIC BLOOD PRESSURE: 83 MMHG | TEMPERATURE: 98 F | RESPIRATION RATE: 18 BRPM

## 2023-04-24 VITALS
HEART RATE: 82 BPM | HEIGHT: 64 IN | SYSTOLIC BLOOD PRESSURE: 175 MMHG | TEMPERATURE: 98 F | WEIGHT: 144.4 LBS | RESPIRATION RATE: 18 BRPM | OXYGEN SATURATION: 97 % | DIASTOLIC BLOOD PRESSURE: 69 MMHG

## 2023-04-24 DIAGNOSIS — Z90.710 ACQUIRED ABSENCE OF BOTH CERVIX AND UTERUS: Chronic | ICD-10-CM

## 2023-04-24 LAB
ALBUMIN SERPL ELPH-MCNC: 4.1 G/DL — SIGNIFICANT CHANGE UP (ref 3.3–5)
ALP SERPL-CCNC: 83 U/L — SIGNIFICANT CHANGE UP (ref 40–120)
ALT FLD-CCNC: 16 U/L — SIGNIFICANT CHANGE UP (ref 10–45)
ANION GAP SERPL CALC-SCNC: 11 MMOL/L — SIGNIFICANT CHANGE UP (ref 5–17)
APPEARANCE UR: ABNORMAL
AST SERPL-CCNC: 17 U/L — SIGNIFICANT CHANGE UP (ref 10–40)
BACTERIA # UR AUTO: ABNORMAL /HPF
BILIRUB SERPL-MCNC: 0.2 MG/DL — SIGNIFICANT CHANGE UP (ref 0.2–1.2)
BILIRUB UR-MCNC: NEGATIVE — SIGNIFICANT CHANGE UP
BUN SERPL-MCNC: 33 MG/DL — HIGH (ref 7–23)
CALCIUM SERPL-MCNC: 9.7 MG/DL — SIGNIFICANT CHANGE UP (ref 8.4–10.5)
CHLORIDE SERPL-SCNC: 106 MMOL/L — SIGNIFICANT CHANGE UP (ref 96–108)
CO2 SERPL-SCNC: 24 MMOL/L — SIGNIFICANT CHANGE UP (ref 22–31)
COLOR SPEC: YELLOW — SIGNIFICANT CHANGE UP
COMMENT - URINE: SIGNIFICANT CHANGE UP
CREAT SERPL-MCNC: 1.5 MG/DL — HIGH (ref 0.5–1.3)
DIFF PNL FLD: ABNORMAL
EGFR: 35 ML/MIN/1.73M2 — LOW
EPI CELLS # UR: ABNORMAL /HPF (ref 0–5)
GLUCOSE SERPL-MCNC: 124 MG/DL — HIGH (ref 70–99)
GLUCOSE UR QL: NEGATIVE — SIGNIFICANT CHANGE UP
HCT VFR BLD CALC: 37.2 % — SIGNIFICANT CHANGE UP (ref 34.5–45)
HGB BLD-MCNC: 12.2 G/DL — SIGNIFICANT CHANGE UP (ref 11.5–15.5)
KETONES UR-MCNC: NEGATIVE — SIGNIFICANT CHANGE UP
LEUKOCYTE ESTERASE UR-ACNC: ABNORMAL
MAGNESIUM SERPL-MCNC: 1.7 MG/DL — SIGNIFICANT CHANGE UP (ref 1.6–2.6)
MCHC RBC-ENTMCNC: 31 PG — SIGNIFICANT CHANGE UP (ref 27–34)
MCHC RBC-ENTMCNC: 32.8 GM/DL — SIGNIFICANT CHANGE UP (ref 32–36)
MCV RBC AUTO: 94.4 FL — SIGNIFICANT CHANGE UP (ref 80–100)
NITRITE UR-MCNC: NEGATIVE — SIGNIFICANT CHANGE UP
NRBC # BLD: 0 /100 WBCS — SIGNIFICANT CHANGE UP (ref 0–0)
PH UR: 6 — SIGNIFICANT CHANGE UP (ref 5–8)
PLATELET # BLD AUTO: 326 K/UL — SIGNIFICANT CHANGE UP (ref 150–400)
POTASSIUM SERPL-MCNC: 4.4 MMOL/L — SIGNIFICANT CHANGE UP (ref 3.5–5.3)
POTASSIUM SERPL-SCNC: 4.4 MMOL/L — SIGNIFICANT CHANGE UP (ref 3.5–5.3)
PROT SERPL-MCNC: 6.9 G/DL — SIGNIFICANT CHANGE UP (ref 6–8.3)
PROT UR-MCNC: 30 MG/DL
RBC # BLD: 3.94 M/UL — SIGNIFICANT CHANGE UP (ref 3.8–5.2)
RBC # FLD: 14.7 % — HIGH (ref 10.3–14.5)
RBC CASTS # UR COMP ASSIST: < 5 /HPF — SIGNIFICANT CHANGE UP
SODIUM SERPL-SCNC: 141 MMOL/L — SIGNIFICANT CHANGE UP (ref 135–145)
SP GR SPEC: 1.02 — SIGNIFICANT CHANGE UP (ref 1–1.03)
UROBILINOGEN FLD QL: 0.2 E.U./DL — SIGNIFICANT CHANGE UP
WBC # BLD: 13.08 K/UL — HIGH (ref 3.8–10.5)
WBC # FLD AUTO: 13.08 K/UL — HIGH (ref 3.8–10.5)
WBC UR QL: ABNORMAL /HPF

## 2023-04-24 PROCEDURE — 99284 EMERGENCY DEPT VISIT MOD MDM: CPT

## 2023-04-24 PROCEDURE — 70450 CT HEAD/BRAIN W/O DYE: CPT | Mod: 26,MA

## 2023-04-24 PROCEDURE — 80053 COMPREHEN METABOLIC PANEL: CPT

## 2023-04-24 PROCEDURE — 70450 CT HEAD/BRAIN W/O DYE: CPT | Mod: MA

## 2023-04-24 PROCEDURE — 81001 URINALYSIS AUTO W/SCOPE: CPT

## 2023-04-24 PROCEDURE — 83735 ASSAY OF MAGNESIUM: CPT

## 2023-04-24 PROCEDURE — 85027 COMPLETE CBC AUTOMATED: CPT

## 2023-04-24 PROCEDURE — 99284 EMERGENCY DEPT VISIT MOD MDM: CPT | Mod: 25

## 2023-04-24 PROCEDURE — 36415 COLL VENOUS BLD VENIPUNCTURE: CPT

## 2023-04-24 PROCEDURE — 82962 GLUCOSE BLOOD TEST: CPT

## 2023-04-24 RX ORDER — NITROFURANTOIN MACROCRYSTAL 50 MG
1 CAPSULE ORAL
Qty: 14 | Refills: 0
Start: 2023-04-24 | End: 2023-04-30

## 2023-04-24 NOTE — ED PROVIDER NOTE - NEUROLOGICAL, MLM
Alert and oriented, no focal deficits, no motor or sensory deficits. cn/pn intact, nml gait nml rhomberg

## 2023-04-24 NOTE — ED ADULT TRIAGE NOTE - CHIEF COMPLAINT QUOTE
81 y/o female c/o drooling from right side of the mouth and eye pressure. [facility] to [facility] and was told to come to ED to r/o stroke.

## 2023-04-24 NOTE — ASSESSMENT
[FreeTextEntry1] : all lab data was reviewed with patient in detail from 2/22/2021\par  80-year-old woman with CKD 3, HTN, proteinuria, hematuria and gout. \par -HTN: BP within goal c/w lifestyle modification- weight loss\par (NB: says that benicar brand worked best now off formulary) \par  -CKD 3 - creat up a bit to 1.60- probable regular variability, but will have her repeat  to assure stability\par K, CO2 okay; BP and volume status acceptable \par -hyperparathyroidism with high normal calcium level- 107, 10.4;  more c/w primary hyperparathyroidism- continue to defer use of Vit D; reminded her to forward DXA scan\par -proteinuria - Uprot/creat 326- stable-  c/w ramipril \par -Gout: testing to see if can tolerate uric acid lowering agents- will f/u with allergy\par also will measure lead level- \par (prior visit: reviewed purine restriction- seem to get attacks when UA > = 7.9-8)\par  c/w cherry juice and tumeric-\par -hyperglycemia- was on prednisone when labs drawn- monitor\par -hyperlipidemia-  stable -some elevation but muscle pain with statin-  will refocus on lifestyle modification-\par HDL very food at 79\par -LE edema: resolved\par --hematuria- resolved-\par \par \par will call her with results of repeat chem panel and lead level\par f/u 4 months
Wound care

## 2023-04-24 NOTE — ED PROVIDER NOTE - OBJECTIVE STATEMENT
What to do after your steroid shot:    · Protect the injection area for a day or two. If the shot was in your knee or hip, refrain from excessive walking/exercising. If it was in your shoulder, do not lift heavy objects.   · Apply ice to the injection site as needed to relieve pain  · Gentle return to activity such as walking and stretching is recommended after this brief period of rest.    Side effects:  · Lightening of the skin can occur at the injection site, particularly if you have darker skin  · Redness and a feeling of warmth of the chest and face immediately after the shot  · Increase in blood glucose (sugar) levels if you have diabetes    Results:  This depends on the reason for the treatment. The shots can cause a temporary flare in pain and inflammation for up to 48 hours after the injection. After that, your pain and inflammation of the affected area should decrease and this effect can last up to several months.\If you have increasing pain, redness and swelling that last more than 48 hours, please call the office.    
82 F co facial drooling on R side and eye pressure- sx started more than 4-5 days ago- occ feels mild drooling from R side  no abnml w speach/facial symmetry no change in vision no photophobia no facial pain  pmh HTN, CKD no mi/cva  on increased dose of prednisone for gout- 20 mg for 1-2 days  feels jittery from prednisone  no ha no n/v  no weakness/tingling/numbness  mod severity

## 2023-04-24 NOTE — ED ADULT NURSE NOTE - OBJECTIVE STATEMENT
Pt A&Ox4, ambulatory with steady gait, speaking in clear/full sentences, no acute distress, vital signs stable. Pt presents to the ED for right sided drooling and right sided eye pressure since yesterday. No facial droop noted on exam. "Pt endorses I look normal to my self too." Pt also endorses similar episode a few days ago. Pt on prednisone for gout.

## 2023-04-24 NOTE — ED PROVIDER NOTE - CLINICAL SUMMARY MEDICAL DECISION MAKING FREE TEXT BOX
co R eye pressure- nml eye pressures 18- BL va is 20/50 uncorrected- does not have her glasses  no ha no speech abnml- mouth drooling for several days- int- no abml on exam- doubt stroke- will check baseline labs, plain head CT

## 2023-04-24 NOTE — ED PROVIDER NOTE - PATIENT PORTAL LINK FT
You can access the FollowMyHealth Patient Portal offered by [facility] by registering at the following website: [email or web address]. By joining [email or web address] FollowMyHealth portal, you will also be able to view your health information using other applications (apps) compatible with our system.

## 2023-04-24 NOTE — ED PROVIDER NOTE - NSFOLLOWUPINSTRUCTIONS_ED_ALL_ED_FT
Urinary Tract Infection, Adult    A urinary tract infection (UTI) is an infection of any part of the urinary tract. The urinary tract includes the kidneys, ureters, bladder, and urethra. These organs make, store, and get rid of urine in the body.    An upper UTI affects the ureters and kidneys. A lower UTI affects the bladder and urethra.    What are the causes?  Most urinary tract infections are caused by bacteria in your genital area around your urethra, where urine leaves your body. These bacteria grow and cause inflammation of your urinary tract.    What increases the risk?  You are more likely to develop this condition if:  You have a urinary catheter that stays in place.  You are not able to control when you urinate or have a bowel movement (incontinence).  You are female and you:  Use a spermicide or diaphragm for birth control.  Have low estrogen levels.  Are pregnant.  You have certain genes that increase your risk.  You are sexually active.  You take antibiotic medicines.  You have a condition that causes your flow of urine to slow down, such as:  An enlarged prostate, if you are male.  Blockage in your urethra.  A kidney stone.  A nerve condition that affects your bladder control (neurogenic bladder).  Not getting enough to drink, or not urinating often.  You have certain medical conditions, such as:  Diabetes.  A weak disease-fighting system (immunesystem).  Sickle cell disease.  Gout.  Spinal cord injury.  What are the signs or symptoms?  Symptoms of this condition include:  Needing to urinate right away (urgency).  Frequent urination. This may include small amounts of urine each time you urinate.  Pain or burning with urination.  Blood in the urine.  Urine that smells bad or unusual.  Trouble urinating.  Cloudy urine.  Vaginal discharge, if you are female.  Pain in the abdomen or the lower back.  You may also have:  Vomiting or a decreased appetite.  Confusion.  Irritability or tiredness.  A fever or chills.  Diarrhea.  The first symptom in older adults may be confusion. In some cases, they may not have any symptoms until the infection has worsened.    How is this diagnosed?  This condition is diagnosed based on your medical history and a physical exam. You may also have other tests, including:  Urine tests.  Blood tests.  Tests for STIs (sexually transmitted infections).  If you have had more than one UTI, a cystoscopy or imaging studies may be done to determine the cause of the infections.    How is this treated?  Treatment for this condition includes:  Antibiotic medicine.  Over-the-counter medicines to treat discomfort.  Drinking enough water to stay hydrated.  If you have frequent infections or have other conditions such as a kidney stone, you may need to see a health care provider who specializes in the urinary tract (urologist).    In rare cases, urinary tract infections can cause sepsis. Sepsis is a life-threatening condition that occurs when the body responds to an infection. Sepsis is treated in the hospital with IV antibiotics, fluids, and other medicines.    Follow these instructions at home:    Medicines    Take over-the-counter and prescription medicines only as told by your health care provider.  If you were prescribed an antibiotic medicine, take it as told by your health care provider. Do not stop using the antibiotic even if you start to feel better.  General instructions    Make sure you:  Empty your bladder often and completely. Do not hold urine for long periods of time.  Empty your bladder after sex.  Wipe from front to back after urinating or having a bowel movement if you are female. Use each tissue only one time when you wipe.  Drink enough fluid to keep your urine pale yellow.  Keep all follow-up visits. This is important.  Contact a health care provider if:  Your symptoms do not get better after 1–2 days.  Your symptoms go away and then return.  Get help right away if:  You have severe pain in your back or your lower abdomen.  You have a fever or chills.  You have nausea or vomiting.  Summary  A urinary tract infection (UTI) is an infection of any part of the urinary tract, which includes the kidneys, ureters, bladder, and urethra.  Most urinary tract infections are caused by bacteria in your genital area.  Treatment for this condition often includes antibiotic medicines.  If you were prescribed an antibiotic medicine, take it as told by your health care provider. Do not stop using the antibiotic even if you start to feel better.  Keep all follow-up visits. This is important.  This information is not intended to replace advice given to you by your health care provider. Make sure you discuss any questions you have with your health care provider.    Document Revised: 07/30/2021 Document Reviewed: 07/30/2021

## 2023-04-25 RX ORDER — NITROFURANTOIN MACROCRYSTAL 50 MG
1 CAPSULE ORAL
Qty: 14 | Refills: 0
Start: 2023-04-25 | End: 2023-05-01

## 2023-04-26 DIAGNOSIS — Z88.0 ALLERGY STATUS TO PENICILLIN: ICD-10-CM

## 2023-04-26 DIAGNOSIS — Z88.2 ALLERGY STATUS TO SULFONAMIDES: ICD-10-CM

## 2023-04-26 DIAGNOSIS — Z79.82 LONG TERM (CURRENT) USE OF ASPIRIN: ICD-10-CM

## 2023-04-26 DIAGNOSIS — H57.11 OCULAR PAIN, RIGHT EYE: ICD-10-CM

## 2023-04-26 DIAGNOSIS — Z88.8 ALLERGY STATUS TO OTHER DRUGS, MEDICAMENTS AND BIOLOGICAL SUBSTANCES: ICD-10-CM

## 2023-04-26 DIAGNOSIS — Z88.1 ALLERGY STATUS TO OTHER ANTIBIOTIC AGENTS STATUS: ICD-10-CM

## 2023-04-26 DIAGNOSIS — N30.90 CYSTITIS, UNSPECIFIED WITHOUT HEMATURIA: ICD-10-CM

## 2023-04-26 DIAGNOSIS — N18.9 CHRONIC KIDNEY DISEASE, UNSPECIFIED: ICD-10-CM

## 2023-04-26 DIAGNOSIS — R29.810 FACIAL WEAKNESS: ICD-10-CM

## 2023-04-26 DIAGNOSIS — I12.9 HYPERTENSIVE CHRONIC KIDNEY DISEASE WITH STAGE 1 THROUGH STAGE 4 CHRONIC KIDNEY DISEASE, OR UNSPECIFIED CHRONIC KIDNEY DISEASE: ICD-10-CM

## 2023-04-26 DIAGNOSIS — Z91.018 ALLERGY TO OTHER FOODS: ICD-10-CM

## 2023-05-01 LAB
ALBUMIN SERPL ELPH-MCNC: 4.3 G/DL
ANION GAP SERPL CALC-SCNC: 12 MMOL/L
BUN SERPL-MCNC: 34 MG/DL
CALCIUM SERPL-MCNC: 10.3 MG/DL
CALCIUM SERPL-MCNC: 10.3 MG/DL
CHLORIDE SERPL-SCNC: 105 MMOL/L
CO2 SERPL-SCNC: 24 MMOL/L
CREAT SERPL-MCNC: 1.51 MG/DL
EGFR: 35 ML/MIN/1.73M2
GLUCOSE SERPL-MCNC: 89 MG/DL
PARATHYROID HORMONE INTACT: 60 PG/ML
PHOSPHATE SERPL-MCNC: 3.9 MG/DL
POTASSIUM SERPL-SCNC: 4.1 MMOL/L
SODIUM SERPL-SCNC: 142 MMOL/L

## 2023-05-05 ENCOUNTER — APPOINTMENT (OUTPATIENT)
Dept: NEPHROLOGY | Facility: CLINIC | Age: 83
End: 2023-05-05
Payer: MEDICARE

## 2023-05-05 VITALS — DIASTOLIC BLOOD PRESSURE: 68 MMHG | HEART RATE: 70 BPM | SYSTOLIC BLOOD PRESSURE: 134 MMHG

## 2023-05-05 DIAGNOSIS — E83.52 HYPERCALCEMIA: ICD-10-CM

## 2023-05-05 PROCEDURE — 99496 TRANSJ CARE MGMT HIGH F2F 7D: CPT

## 2023-05-05 RX ORDER — CINACALCET 30 MG/1
30 TABLET ORAL DAILY
Qty: 90 | Refills: 3 | Status: DISCONTINUED | COMMUNITY
Start: 2023-02-06 | End: 2023-05-05

## 2023-05-07 NOTE — HISTORY OF PRESENT ILLNESS
[FreeTextEntry1] : 83 yo woman here for f/u evaluation of acute gout attack.\par [place] [facility] [place] (known recurrent bowel obstructions) resolved with conservative management- no surgery- did need Morphine\par towards end of her hospital stay developed an acute gout attack-  was dc'd home on prednisone on 5/3- spoke with NP there prior her dc.- 20 mg  on day 1, then dropped to 10 mg yesterday- now at 7.5 mg- will continue taper by 2.5 mg every day or so.\par also h/o CKD 4, Gout, HTN, HLD, hyperparathyroidism, Hypothyroidism, obesity, arthritis, and multiple drug allergies\par she read somewhere that cinacalcet can precipitate gout attacks, so she dc'd\par will need to monitor Ca and PTH levels-  can precipitate pseudogout- will refer to rheum to establish dx and appropriate treatment\par in addition, she reports that her elevated tryptase levels have dropped- no need for heme f/u per heme.\par \par 2/2022 note:\par did test + rheumatoid factor-\par Low back pain, from ? arthritis- going to PT- helpful- \par Prefer her to start sglt2i- she declines over concern about UTIs\par \par reminder from prior note:\par ramipril was changed to metoprolol (elevated mast cells) in beginning of August; subsequently developed depressive symptoms- metoprolol dc'd and HCTZ added, initially 25 mg, then reduced to 1/2 tablet daily. depression resolved.\par \par \par \par

## 2023-05-07 NOTE — ASSESSMENT
[FreeTextEntry1] : all lab data was reviewed with patient in detail from 3/15/2023 and [facility] [place] reports\par 83 yo woman with recent hospitalization for SBO, with subsequent gout flare while inpatient, treated with prednisone;\par h/o CKD 4, Gout, HTN, HLD, Hypothyroidism, obesity, arthritis, multiple drug allergies  \par -gout- as above c/w prednisone taper- feels that her pain is improved- \par referred to rheum to establish dx of gout and/or ?psudogout, and to establish a better plan for her recurrent attacks\par -HTN- BP acceptable\par -CKD 4-  clinically stable; creat 1.5-1.7 range; Na, K, CO2 okay- avoiding NSAIDS\par HLD-  controlled on repatha\par -hyperparathyroidism- c/w tertiary hyperpara given elevated calcium- would prefer restart of sensipar- will defer until completes rheum eval. unless calcium too high\par -arthritis-  refer to rheum \par \par \par keep her routine f/u scheduled June appt. \par  Breath sounds clear and equal bilaterally.

## 2023-05-07 NOTE — PHYSICAL EXAM
[General Appearance - Alert] : alert [General Appearance - In No Acute Distress] : in no acute distress [] : no respiratory distress [Auscultation Breath Sounds / Voice Sounds] : lungs were clear to auscultation bilaterally [Heart Rate And Rhythm] : heart rate was normal and rhythm regular [Heart Sounds] : normal S1 and S2 [Heart Sounds Gallop] : no gallops [Murmurs] : no murmurs [Heart Sounds Pericardial Friction Rub] : no pericardial rub [Edema] : there was no peripheral edema [No CVA Tenderness] : no ~M costovertebral angle tenderness [FreeTextEntry1] : tophi R index finger

## 2023-05-17 ENCOUNTER — APPOINTMENT (OUTPATIENT)
Dept: INTERNAL MEDICINE | Facility: CLINIC | Age: 83
End: 2023-05-17
Payer: MEDICARE

## 2023-05-17 ENCOUNTER — APPOINTMENT (OUTPATIENT)
Dept: HEART AND VASCULAR | Facility: CLINIC | Age: 83
End: 2023-05-17
Payer: MEDICARE

## 2023-05-17 VITALS — TEMPERATURE: 98.6 F | SYSTOLIC BLOOD PRESSURE: 150 MMHG | HEART RATE: 72 BPM | DIASTOLIC BLOOD PRESSURE: 68 MMHG

## 2023-05-17 VITALS
OXYGEN SATURATION: 98 % | SYSTOLIC BLOOD PRESSURE: 137 MMHG | DIASTOLIC BLOOD PRESSURE: 69 MMHG | HEIGHT: 64 IN | RESPIRATION RATE: 16 BRPM | WEIGHT: 143 LBS | BODY MASS INDEX: 24.41 KG/M2 | TEMPERATURE: 97.7 F | HEART RATE: 76 BPM

## 2023-05-17 PROCEDURE — 93306 TTE W/DOPPLER COMPLETE: CPT

## 2023-05-17 PROCEDURE — 99213 OFFICE O/P EST LOW 20 MIN: CPT

## 2023-05-17 NOTE — PHYSICAL EXAM
[No Acute Distress] : no acute distress [Normal Sclera/Conjunctiva] : normal sclera/conjunctiva [EOMI] : extraocular movements intact [Normal] : normal rate, regular rhythm, normal S1 and S2 and no murmur heard [No Edema] : there was no peripheral edema

## 2023-05-18 ENCOUNTER — TRANSCRIPTION ENCOUNTER (OUTPATIENT)
Age: 83
End: 2023-05-18

## 2023-05-18 NOTE — HISTORY OF PRESENT ILLNESS
[Post-hospitalization from ___ Hospital] : Post-hospitalization from [unfilled] Hospital [Admitted on: ___] : The patient was admitted on [unfilled] [Discharged on ___] : discharged on [unfilled] [Discharge Summary] : discharge summary [Discharge Med List] : discharge medication list [Med Reconciliation] : medication reconciliation has been completed [FreeTextEntry2] : [place] [facility] [place] pain and bloating. This is acute on chronic issue she has had it for about 20 yrs.\par - found to have partial SBO\par - treated w/ liquid diet and monitored x 3 days\par - no NGT required\par \par polyarthralgia\par - currently off Sensipar b/c of side effect of gout exacerbations\par

## 2023-05-18 NOTE — HISTORY OF PRESENT ILLNESS
[Post-hospitalization from ___ Hospital] : Post-hospitalization from [unfilled] Hospital [Admitted on: ___] : The patient was admitted on [unfilled] [Discharged on ___] : discharged on [unfilled] [Discharge Summary] : discharge summary [Discharge Med List] : discharge medication list [Med Reconciliation] : medication reconciliation has been completed [FreeTextEntry2] : presented to [facility] [place] [place] for evaluation of abdominal pain and bloating. This is acute on chronic issue she has had it for about 20 yrs.\par - found to have partial SBO\par - treated w/ liquid diet and monitored x 3 days\par - no NGT required\par \par polyarthralgia\par - currently off Sensipar b/c of side effect of gout exacerbations\par

## 2023-06-04 LAB
25(OH)D3 SERPL-MCNC: 34.4 NG/ML
ALBUMIN SERPL ELPH-MCNC: 4.1 G/DL
ALP BLD-CCNC: 88 U/L
ALT SERPL-CCNC: 15 U/L
ANION GAP SERPL CALC-SCNC: 12 MMOL/L
APPEARANCE: CLEAR
AST SERPL-CCNC: 25 U/L
BACTERIA: ABNORMAL /HPF
BILIRUB SERPL-MCNC: 0.2 MG/DL
BILIRUBIN URINE: NEGATIVE
BLOOD URINE: NEGATIVE
BUN SERPL-MCNC: 33 MG/DL
CALCIUM SERPL-MCNC: 10.7 MG/DL
CALCIUM SERPL-MCNC: 10.7 MG/DL
CAST: 0 /LPF
CHLORIDE SERPL-SCNC: 108 MMOL/L
CHOLEST SERPL-MCNC: 158 MG/DL
CO2 SERPL-SCNC: 24 MMOL/L
COLOR: YELLOW
CREAT SERPL-MCNC: 1.41 MG/DL
CREAT SPEC-SCNC: 83 MG/DL
EGFR: 38 ML/MIN/1.73M2
EPITHELIAL CELLS: 4 /HPF
GLUCOSE QUALITATIVE U: NEGATIVE MG/DL
GLUCOSE SERPL-MCNC: 101 MG/DL
HDLC SERPL-MCNC: 94 MG/DL
IRON SATN MFR SERPL: 19 %
IRON SERPL-MCNC: 55 UG/DL
KETONES URINE: NEGATIVE MG/DL
LDLC SERPL CALC-MCNC: 53 MG/DL
LEUKOCYTE ESTERASE URINE: ABNORMAL
MAGNESIUM SERPL-MCNC: 1.9 MG/DL
MICROALBUMIN 24H UR DL<=1MG/L-MCNC: 5.8 MG/DL
MICROALBUMIN/CREAT 24H UR-RTO: 69 MG/G
MICROSCOPIC-UA: NORMAL
NITRITE URINE: POSITIVE
NONHDLC SERPL-MCNC: 64 MG/DL
PARATHYROID HORMONE INTACT: 133 PG/ML
PH URINE: 5.5
PHOSPHATE SERPL-MCNC: 3.1 MG/DL
POTASSIUM SERPL-SCNC: 4.3 MMOL/L
PROT SERPL-MCNC: 6.6 G/DL
PROTEIN URINE: 30 MG/DL
RED BLOOD CELLS URINE: 1 /HPF
SODIUM SERPL-SCNC: 143 MMOL/L
SPECIFIC GRAVITY URINE: 1.01
TIBC SERPL-MCNC: 297 UG/DL
TRIGL SERPL-MCNC: 54 MG/DL
UIBC SERPL-MCNC: 242 UG/DL
URATE SERPL-MCNC: 8.1 MG/DL
UROBILINOGEN URINE: 0.2 MG/DL
WHITE BLOOD CELLS URINE: 9 /HPF

## 2023-06-05 ENCOUNTER — APPOINTMENT (OUTPATIENT)
Dept: NEPHROLOGY | Facility: CLINIC | Age: 83
End: 2023-06-05
Payer: MEDICARE

## 2023-06-05 VITALS — DIASTOLIC BLOOD PRESSURE: 60 MMHG | HEART RATE: 78 BPM | SYSTOLIC BLOOD PRESSURE: 130 MMHG

## 2023-06-05 PROCEDURE — 99214 OFFICE O/P EST MOD 30 MIN: CPT

## 2023-06-07 NOTE — ASSESSMENT
[FreeTextEntry1] : all lab data was reviewed with patient in detail from 5/30/2023\par 81 yo woman with CKD 4, gout, pseudogout on recent hospitalization, HTN, HLD, hypothyroid, arthritis\par remains very active- using bicycle for her mode of transportation\par \par -gout- no changes see if Dr. Sheppard can see her sooner-\par leave \par in July, going on extended travel- trip on [place]- then to [place], then [place] then [place]- flying back\par -HTN- BP controlled\par CKD 4- creat stable; electrolytes okay\par HLD- controlled with repatha\par -hyperparathyroidism- c/w tertiary hyperpara given elevated calcium- advised her to restart of sensipar- \par will  try to expedite rheum eval. \par -arthritis-  refer to rheum \par \par \par f/u 4-6 months\par \par \par \par \par \par \par \par f/u 4-6 months\par

## 2023-06-07 NOTE — HISTORY OF PRESENT ILLNESS
[FreeTextEntry1] : 83 yo woman with CKD 4, gout HTN, HLD, hyperparathyroidism, hypothyroid, multiple drug allergies and recent acute gout attack, here for follow up evaluation.\par feeling back to baseline s/p hospitalization in [place], 2023\par no recurrent joint pains/gout- off prednisone \par no recurrent SBO-\par \par 5/5/2023 note:\par [place] [facility] [place]- (known recurrent bowel obstructions) resolved with conservative management- no surgery- did need Morphine\par towards end of her hospital stay developed an acute gout attack-  was dc'd home on prednisone on 5/3- spoke with NP there prior her dc.- 20 mg  on day 1, then dropped to 10 mg yesterday- now at 7.5 mg- will continue taper by 2.5 mg every day or so.\par she read somewhere that cinacalcet can precipitate gout attacks, so she dc'd\par \par iprior elevated tryptase levels - no need for heme f/u per heme.\par \par 2/2022 note:\par did test + rheumatoid factor-\par Low back pain, from ? arthritis- going to PT- helpful- \par Prefer her to start sglt2i- she declines over concern about UTIs\par \par reminder from prior note:\par ramipril was changed to metoprolol (elevated mast cells) in beginning of August; subsequently developed depressive symptoms- metoprolol dc'd and HCTZ added, initially 25 mg, then reduced to 1/2 tablet daily. depression resolved.\par \par \par \par

## 2023-07-05 ENCOUNTER — EMERGENCY (EMERGENCY)
Facility: HOSPITAL | Age: 83
LOS: 1 days | Discharge: HOME | End: 2023-07-05
Attending: EMERGENCY MEDICINE | Admitting: EMERGENCY MEDICINE
Payer: MEDICARE

## 2023-07-05 VITALS
RESPIRATION RATE: 16 BRPM | SYSTOLIC BLOOD PRESSURE: 146 MMHG | WEIGHT: 138.89 LBS | HEART RATE: 80 BPM | DIASTOLIC BLOOD PRESSURE: 84 MMHG | OXYGEN SATURATION: 98 % | TEMPERATURE: 98 F

## 2023-07-05 DIAGNOSIS — Z79.82 LONG TERM (CURRENT) USE OF ASPIRIN: ICD-10-CM

## 2023-07-05 DIAGNOSIS — E03.9 HYPOTHYROIDISM, UNSPECIFIED: ICD-10-CM

## 2023-07-05 DIAGNOSIS — M79.89 OTHER SPECIFIED SOFT TISSUE DISORDERS: ICD-10-CM

## 2023-07-05 DIAGNOSIS — Z88.2 ALLERGY STATUS TO SULFONAMIDES: ICD-10-CM

## 2023-07-05 DIAGNOSIS — Z90.710 ACQUIRED ABSENCE OF BOTH CERVIX AND UTERUS: Chronic | ICD-10-CM

## 2023-07-05 DIAGNOSIS — I10 ESSENTIAL (PRIMARY) HYPERTENSION: ICD-10-CM
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
## 2023-07-05 DIAGNOSIS — Z91.018 ALLERGY TO OTHER FOODS: ICD-10-CM

## 2023-07-05 DIAGNOSIS — Z88.1 ALLERGY STATUS TO OTHER ANTIBIOTIC AGENTS STATUS: ICD-10-CM

## 2023-07-05 DIAGNOSIS — Z88.0 ALLERGY STATUS TO PENICILLIN: ICD-10-CM

## 2023-07-05 DIAGNOSIS — S80.12XA CONTUSION OF LEFT LOWER LEG, INITIAL ENCOUNTER: ICD-10-CM

## 2023-07-05 DIAGNOSIS — Z88.8 ALLERGY STATUS TO OTHER DRUGS, MEDICAMENTS AND BIOLOGICAL SUBSTANCES: ICD-10-CM
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
## 2023-07-05 PROCEDURE — 99284 EMERGENCY DEPT VISIT MOD MDM: CPT | Mod: 25

## 2023-07-05 PROCEDURE — 99284 EMERGENCY DEPT VISIT MOD MDM: CPT

## 2023-07-05 PROCEDURE — 93971 EXTREMITY STUDY: CPT

## 2023-07-05 PROCEDURE — 93971 EXTREMITY STUDY: CPT | Mod: 26,LT

## 2023-07-05 NOTE — ED ADULT TRIAGE NOTE - CHIEF COMPLAINT QUOTE
swelling to lt  lower leg / ankle for 4 days. Lump noted to lt lower leg for 3 weeks pt denies fever  ,no pain
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
## 2023-07-05 NOTE — ED ADULT NURSE NOTE - NSFALLUNIVINTERV_ED_ALL_ED
Bed/Stretcher in lowest position, wheels locked, appropriate side rails in place/Call bell, personal items and telephone in reach/Instruct patient to call for assistance before getting out of bed/chair/stretcher/Non-slip footwear applied when patient is off stretcher/[place] to call system/Physically safe environment - no spills, clutter or unnecessary equipment/Purposeful proactive rounding/Room/bathroom lighting operational, light cord in reach

## 2023-07-06 VITALS
DIASTOLIC BLOOD PRESSURE: 78 MMHG | OXYGEN SATURATION: 99 % | SYSTOLIC BLOOD PRESSURE: 150 MMHG | HEART RATE: 82 BPM | RESPIRATION RATE: 18 BRPM | TEMPERATURE: 98 F
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
## 2023-07-06 NOTE — ED PROVIDER NOTE - NSFOLLOWUPINSTRUCTIONS_ED_ALL_ED_FT
Can take tylenol 650mg every 6hrs as needed for pain.  Keep feet elevated when possible.  Return for fevers, persistent vomit, uncontrolled pain, worsening breathing, worsening lightheaded, spreading redness.  Follow up with primary doctor within 1-2 days.     Peripheral Edema    Peripheral edema is swelling that is caused by a buildup of fluid. Peripheral edema most often affects the lower legs, ankles, and feet. It can also develop in the arms, hands, and face. The area of the body that has peripheral edema will look swollen. It may also feel heavy or warm. Your clothes may start to feel tight. Pressing on the area may make a temporary dent in your skin. You may not be able to move your arm or leg as much as usual.    There are many causes of peripheral edema. It can be a complication of other diseases, such as congestive heart failure, kidney disease, or a problem with your blood circulation. It also can be a side effect of certain medicines. It often happens to women during pregnancy. Sometimes, the cause is not known. Treating the underlying condition is often the only treatment for peripheral edema.    Follow these instructions at home:  Pay attention to any changes in your symptoms. Take these actions to help with your discomfort:  Raise (elevate) your legs while you are sitting or lying down. Move around often to prevent stiffness and to lessen swelling. Do not sit or stand for long periods of time. Wear support stockings as told by your health care provider. Follow instructions from your health care provider about limiting salt (sodium) in your diet. Sometimes eating less salt can reduce swelling. Take over-the-counter and prescription medicines only as told by your health care provider. Your health care provider may prescribe medicine to help your body get rid of excess water (diuretic).Keep all follow-up visits as told by your health care provider. This is important.   Contact a health care provider if:  You have a fever.  Your edema starts suddenly or is getting worse, especially if you are pregnant or have a medical condition.  You have swelling in only one leg.  You have increased swelling and pain in your legs.  Get help right away if:  You develop shortness of breath, especially when you are lying down.  You have pain in your chest or abdomen.  You feel weak.  You faint.

## 2023-07-06 NOTE — ED PROVIDER NOTE - PATIENT PORTAL LINK FT
You can access the FollowMyHealth Patient Portal offered by [facility] by registering at the following website: [email or web address]. By joining [facility]’s FollowMyHealth portal, you will also be able to view your health information using other applications (apps) compatible with our system.
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
## 2023-07-10 NOTE — ED ADULT NURSE NOTE - NS ED NURSE LEVEL OF CONSCIOUSNESS MENTAL STATUS
From: Rui Patrick  To: Conradprakash Damonan  
Awake/Alert/Cooperative

## 2023-07-31 ENCOUNTER — EMERGENCY (EMERGENCY)
Facility: HOSPITAL | Age: 83
LOS: 1 days | Discharge: HOME | End: 2023-07-31
Attending: STUDENT IN AN ORGANIZED HEALTH CARE EDUCATION/TRAINING PROGRAM | Admitting: STUDENT IN AN ORGANIZED HEALTH CARE EDUCATION/TRAINING PROGRAM
Payer: MEDICARE

## 2023-07-31 VITALS
DIASTOLIC BLOOD PRESSURE: 82 MMHG | SYSTOLIC BLOOD PRESSURE: 170 MMHG | HEART RATE: 64 BPM | TEMPERATURE: 98 F | OXYGEN SATURATION: 99 % | RESPIRATION RATE: 18 BRPM

## 2023-07-31 VITALS
DIASTOLIC BLOOD PRESSURE: 83 MMHG | RESPIRATION RATE: 18 BRPM | OXYGEN SATURATION: 95 % | HEIGHT: 64 IN | SYSTOLIC BLOOD PRESSURE: 186 MMHG | TEMPERATURE: 98 F | WEIGHT: 145.95 LBS | HEART RATE: 88 BPM

## 2023-07-31 DIAGNOSIS — Z90.710 ACQUIRED ABSENCE OF BOTH CERVIX AND UTERUS: Chronic | ICD-10-CM

## 2023-07-31 PROCEDURE — 72125 CT NECK SPINE W/O DYE: CPT | Mod: MG

## 2023-07-31 PROCEDURE — 99284 EMERGENCY DEPT VISIT MOD MDM: CPT | Mod: 25

## 2023-07-31 PROCEDURE — 72125 CT NECK SPINE W/O DYE: CPT | Mod: 26,MG

## 2023-07-31 PROCEDURE — 70486 CT MAXILLOFACIAL W/O DYE: CPT | Mod: MG

## 2023-07-31 PROCEDURE — 70450 CT HEAD/BRAIN W/O DYE: CPT | Mod: 26,MG

## 2023-07-31 PROCEDURE — G1004: CPT

## 2023-07-31 PROCEDURE — 99284 EMERGENCY DEPT VISIT MOD MDM: CPT | Mod: FS

## 2023-07-31 PROCEDURE — 70450 CT HEAD/BRAIN W/O DYE: CPT | Mod: MG

## 2023-07-31 PROCEDURE — 70486 CT MAXILLOFACIAL W/O DYE: CPT | Mod: 26,MG

## 2023-07-31 RX ADMIN — Medication 100 MILLIGRAM(S): at 03:48

## 2023-07-31 NOTE — ED PROVIDER NOTE - PHYSICAL EXAMINATION
GEN: WD/WN, NAD  HEAD: left sided periorbital ecchymosis. No Andrews sign  NEURO: Alert, oriented. CN II-XII intact. Clear speech.  SILT all ext. Motor 5/5 all ext.  Gait steady. F-N intact.  EYE: Left periorbital ecchymosis.  No proptosis. PERRL, EOMI. AC clear. No nystagmus.    ENT: Airway patent.  apx 0.75 cm linear lac to left lower lip, edges well-approximated, no bleeding.  No broken or loose teeth. No epistaxis or rhinorrhea, no septal hematoma.  No otorrhea or hemotympanum.  PULM: No resp distress. Lungs CTA bilat.  CV: RRR, S1S2  GI: Abdomen soft, nontender  MSK: Neck with painless ROM; no midline neck/back tenderness.  Extremities without tenderness, swelling, or ROM limitation.  SKIN: Intact.  Normal color and turgor. GEN: WD/WN, NAD  HEAD: left sided periorbital ecchymosis. No Andrews sign  NEURO: Alert, oriented. CN: no facial asymmetry.  decr sensation to light touch left medial maxillary region.  Clear speech.  SILT all ext. Motor 5/5 all ext.  Gait steady. F-N intact.  EYE: Left periorbital ecchymosis.  No proptosis. PERRL, EOMI. AC clear. No nystagmus.    ENT: Airway patent.  apx 0.75 cm linear lac to left lower lip, edges well-approximated, no bleeding.  No broken or loose teeth. No epistaxis or rhinorrhea, no septal hematoma.  No otorrhea or hemotympanum.  PULM: No resp distress. Lungs CTA bilat.  CV: RRR, S1S2  GI: Abdomen soft, nontender  MSK: Neck with painless ROM; no midline neck/back tenderness.  Extremities without tenderness, swelling, or ROM limitation.  SKIN: Intact.  Normal color and turgor.

## 2023-07-31 NOTE — ED PROVIDER NOTE - PROVIDER TOKENS
PROVIDER:[TOKEN:[[id number]],FOLLOWUP:[1-3 Days]],PROVIDER:[TOKEN:[[id number]],FOLLOWUP:[1-3 Days]]

## 2023-07-31 NOTE — ED PROVIDER NOTE - CARE PROVIDER_API CALL
Camron Ruiz  Otolaryngology  [address] [place] [address]  Phone: [phone]  Fax: [phone]  Follow Up Time: 1-3 Days    Bridgette Bautista  Surgery of the Hand  [address], [address]  [place] [address]  Phone: [phone]  Fax: [phone]  Follow Up Time: 1-3 Days

## 2023-07-31 NOTE — ED PROVIDER NOTE - IV ALTEPLASE EXCL REL HIDDEN
Intensive Care Note                                              Name:  Male-Sarah Cullen MRN# [id number]   Parents: Sarah Cullen    Date/Time of Birth: 13:46 PM  Date of Admission:   2021         History of Present Illness    with a birth weight of 5 lb 11 oz (2580 g), appropriate for gestational age, Gestational Age: 36w0d, male infant born by . Our team was asked by Dr. Spence of [facility] to care for this infant born at [facility].    The infant was admitted to the NICU for further evaluation, monitoring and treatment of prematurity, RDS, and possible sepsis.    Patient Active Problem List   Diagnosis     Prematurity     Respiratory distress syndrome in      Need for observation and evaluation of  for sepsis     RDS (respiratory distress syndrome in the )       OB History   He was born to a 27year-old,  woman at 36 0/7 weeks gestation. Prenatal laboratory studies include:  Blood type/Rh A+,  antibody screen negative, rubella immune, trep ab negative, HepBsAg negative, HIV negative, GBS PCR not done.    Information for the patient's mother:  Sarah Cullen ANASTACIO [[id number]]   27 year old      Information for the patient's mother:  Merlin Cullenvalorie CHAVES [[id number]]        Information for the patient's mother:  Merlin Cullenvalorie CHAVES [[id number]]   Patient's last menstrual period was 2020 (approximate).     Information for the patient's mother:  Subhash Sarah CHAVES [[id number]]   Estimated Date of Delivery: 10/5/21       Information for the patient's mother:  Subhash Sarah CHAVES [[id number]]     Lab Results   Component Value Date/Time    ABO A 2021 10:54 AM    RH Pos 2021 10:54 AM    AS Negative 2021 03:20 AM    AS Neg 2021 10:54 AM    HEPBANG Nonreactive 2021 10:53 AM    HGB 11.6 (L) 2021 03:20 AM    HGB 10.2 (L) 2021 10:10 AM         Previous obstetrical history is unremarkable.  This pregnancy was complicated by history of HSV with no treatment due to timing of delivery at 36 weeks, GBS unknown, and PROM and PTL at 36 0/7.    Information for the patient's mother:  Sarah Cullen [[id number]]     OB History    Para Term  AB Living   1 1 0 1 0 1   SAB TAB Ectopic Multiple Live Births   0 0 0 0 1      # Outcome Date GA Lbr Gurmeet/2nd Weight Sex Delivery Anes PTL Lv   1  21 36w0d / 01:08 2.58 kg (5 lb 11 oz) M Vag-Spont EPI Y KWASI      Name: MANOLO CULLEN-SARAH      Apgar1: 7  Apgar5: 7        Information for the patient's mother:  Sarah Cullen [[id number]]     Patient Active Problem List   Diagnosis     Tension-type headache, not intractable, unspecified chronicity pattern     ROSEMARY (generalized anxiety disorder)     Exercise-induced asthma     Severe episode of recurrent major depressive disorder, without psychotic features (H)     Borderline personality disorder (H)     Mild intermittent asthma without complication     ASCUS on pap smear     Segmental dysfunction of thoracic region     Segmental dysfunction of cervical region     Segmental dysfunction of sacral region     Mechanical back pain     Fall     Encounter for triage in pregnant patient     Labor and delivery, indication for care    .    Medications during this pregnancy included PNV, lexapro.  Information for the patient's mother:  Sarah Cullen [[id number]]     Medications Prior to Admission   Medication Sig Dispense Refill Last Dose     albuterol (PROAIR HFA/PROVENTIL HFA/VENTOLIN HFA) 108 (90 Base) MCG/ACT inhaler Inhale 2 puffs into the lungs every 6 hours 18 g 1 Unknown at Unknown time     cyanocobalamin (VITAMIN B-12) 1000 MCG tablet Take 1 tablet (1,000 mcg) by mouth daily 90 tablet 3 2021 at 0900     escitalopram (LEXAPRO) 20 MG tablet Take 1 tablet (20 mg) by mouth daily (Patient taking differently: Take 10 mg by mouth 2 times daily ) 90 tablet 1 2021 at 0900     famotidine (PEPCID)  40 MG tablet Take 1 tablet (40 mg) by mouth daily 90 tablet 3 Past Month at Unknown time     ferrous sulfate (FEROSUL) 325 (65 Fe) MG tablet    2021 at 0900     gabapentin (NEURONTIN) 300 MG capsule Take 1 capsule (300 mg) by mouth 3 times daily 90 capsule 1 2021 at 0900     Prenatal Vit-Fe Fumarate-FA (PRENATAL MULTIVITAMIN W/IRON) 27-0.8 MG tablet Take 1 tablet by mouth daily 90 tablet 3 2021 at 0900     acyclovir (ZOVIRAX) 400 MG tablet Take 400 mg by mouth 3 times daily as needed (Has on hand in case of outbreak)  21 tablet 0      [DISCONTINUED] ondansetron (ZOFRAN) 4 MG tablet Take 1 tablet (4 mg) by mouth every 8 hours as needed for nausea 20 tablet 4 More than a month at Unknown time        Birth History:   His mother was admitted to the hospital on 21 for  labor and concern for PPROM. Labor and delivery were complicated by PROM, GBS unknown, and HSV hitory with no recent lesions or treatment. ROM occurred 16 hours prior to delivery. Amniotic fluid was clear.  Medications during labor included epidural anesthesia and antibiotics x 2 doses.      Information for the patient's mother:  Sarah Cullen [[id number]]     Current Facility-Administered Medications Ordered in Epic   Medication Dose Route Frequency Last Rate Last Admin     acetaminophen (TYLENOL) tablet 650 mg  650 mg Oral Q4H PRN   650 mg at 21     benzocaine (AMERICAINE) 20 % topical spray   Topical 4x Daily PRN   Given at 21     bisacodyl (DULCOLAX) Suppository 10 mg  10 mg Rectal Daily PRN         carboprost (HEMABATE) injection 250 mcg  250 mcg Intramuscular Q15 Min PRN         docusate sodium (COLACE) capsule 100 mg  100 mg Oral Daily   100 mg at 21     escitalopram (LEXAPRO) tablet 10 mg  10 mg Oral BID   10 mg at 21     famotidine (PEPCID) tablet 40 mg  40 mg Oral Daily   40 mg at 21     ferrous sulfate (FEROSUL) tablet 325 mg  325 mg Oral Daily   325 mg at 21  0948     gabapentin (NEURONTIN) capsule 300 mg  300 mg Oral TID   300 mg at 21     hydrocortisone 2.5 % cream   Rectal TID PRN         ibuprofen (ADVIL/MOTRIN) tablet 800 mg  800 mg Oral Q6H PRN   800 mg at 21 0522     lanolin cream   Topical Q1H PRN         methylergonovine (METHERGINE) injection 200 mcg  200 mcg Intramuscular Q2H PRN         misoprostol (CYTOTEC) tablet 400 mcg  400 mcg Oral ONCE PRN REPEAT PER INSTRUCTIONS        Or     misoprostol (CYTOTEC) tablet 800 mcg  800 mcg Rectal ONCE PRN REPEAT PER INSTRUCTIONS         naloxone (NARCAN) injection 0.2 mg  0.2 mg Intravenous Q2 Min PRN        Or     naloxone (NARCAN) injection 0.4 mg  0.4 mg Intravenous Q2 Min PRN        Or     naloxone (NARCAN) injection 0.2 mg  0.2 mg Intramuscular Q2 Min PRN        Or     naloxone (NARCAN) injection 0.4 mg  0.4 mg Intramuscular Q2 Min PRN         No MMR Needed - Assessment: Patient does not need MMR vaccine   Does not apply Continuous PRN         No Tdap Needed - Assessment: Patient does not need Tdap vaccine   Does not apply Continuous PRN         oxyCODONE (ROXICODONE) tablet 5 mg  5 mg Oral Q4H PRN   5 mg at 21     oxytocin (PITOCIN) 30 units in 500 mL 0.9% NaCl infusion  340 mL/hr Intravenous Continuous PRN         oxytocin (PITOCIN) injection 10 Units  10 Units Intramuscular Once PRN         tranexamic acid 1 g in 100 mL 0.7% NaCl IV bag (premix)  1 g Intravenous Q30 Min PRN         Current Outpatient Medications Ordered in Epic   Medication     acetaminophen (TYLENOL) 500 MG tablet     ibuprofen (ADVIL/MOTRIN) 600 MG tablet     SENNA-docusate sodium (SENNA S) 8.6-50 MG tablet         Resuscitation included: Requested by Dr. Spence to attend the delivery of this , male infant with a gestational age of 36 0/7 weeks secondary to prematurity and RDS after delivery.      Infant delivered at 1546 hours on 2021. The infant was stimulated, cried and h  ad spontaneous respirations  during delayed cord clamping. The infant remained skin to skin with MOB.  NICU team allowed to leave per L&D at this time.  Called back to L&D within 3 min for apnea episode and increased WOB. Infant was noted to have mode  rately increased WOB upon entering the room, infant with retractions, nasal flaring, and grunting.  Pulse ox on right wrist and O2 sats were intially 95%, and with the increased WOB the O2 saturations were noted to decrease to the high 80's.  Infant   was suctioned out for moderate amount of clear fluid.  NeoPuff CPAP +6 was placed on infant with 30% oxygen, and O2 saturations and WOB improved.  At time of transfer infant was on CPAP +6 in 25%, with O2 saturations 100%. Apgars were 7 at one minute   and 7 at five minutes of age. Gross physical exam is WNL except for increased WOB. Infant was shown to mother and father and will be transferred to the NICU for further care.    This resuscitation and all procedures were performed by this author.       Alexandria COVARRUBIAS, CNP 2021 5:23 PM   Advanced Practice Providers  [facility]    Apgar scores were 7 and 7, at one and five minutes respectively.         Assessment & Plan   Overall Status:    41-hour old,  , AGA male, now 36w2d PMA.     This patient is critically ill with respiratory failure requiring CPAP.      Vascular Access:    PIV.      FEN:  Vitals:    21 1546 21 1700   Weight: 2.58 kg (5 lb 11 oz) 2.43 kg (5 lb 5.7 oz)       Normoglycemic. Serum glucose on admission 70 mg/dL.    - admission [id number]   - TF goal 100 ml/kg/day.  - Initially NPO with sTPN/IL. Now started on advancing enteral feeds. Feeds are well tolerated. Currently on 25 ml q 3 hours.  Increasing volume. Weaned off IVF.    - Monitor fluid status, glucose, and electrolytes.   - Strict I&O  - Consult lactation specialist and dietician.    Resp:   Respiratory failure requiring nasal CPAP +6, initially in  40% and then quickly weaned to 21%.  - Now weaned off CPAP .    Currently stable in RA without distress.    CV:   Stable. Good perfusion and BP.    - Routine CR monitoring.    - obtain CCHD screen.     ID:   Potential for sepsis in the setting of PTL and PPROM. IAP administered x 2 doses PTD.   - CBC d/p and blood cultures on admission, Cultures are negative.   - IV Ampicillin and gentamicin. Low suspicion for ongoing bacterial infection.  Stopping antibiotics after 48 hours.   - routine IP surveillance tests for MRSA and SARS-CoV-2     Jaundice:   At risk for hyperbilirubinemia due to NPO, prematurity and sepsis.  Maternal blood type A+.  - Determine blood type and KAY if bilirubin rapidly rising or phototherapy indicated.    - Monitor bilirubin and hemoglobin. Consider phototherapy for bili as needed    CNS:  Standard NICU monitoring and assessment.    Toxicology:   Infant meets criteria for toxicology screening d/t  birth unknown etiology. If maternal urine was not sent, send urine and meconium if umbilical cord sample was not sent. Send only urine if umbilical sample was sent.  With maternal urine, umbilical sample should be obtained. Send meconium if there is no umbilical sample.     - Cord blood sample sent for tox.    Sedation/Pain Management:    - Non-pharmacologic comfort measures.Sweet-ease for painful procedures.    Thermoregulation:  - Monitor temperature and provide thermal support as indicated.    HCM and Discharge Planning:  Screening tests indicated [place]:  - [place]  - CCHD screen at 24-48 hr and on RA.  - Hearing test PTD  - Carseat trial PTD  - OT input.  - Continue standard NICU cares and family education plan.      Immunizations   - Give Hep B immunization now (BW >= 2000gm).       Medications   Current Facility-Administered Medications   Medication     ampicillin 250 mg in NS injection PEDS/NICU     Breast Milk label for barcode scanning 1 Bottle      glycerin (PEDI-LAX) Suppository 0.25 suppository     sodium chloride (PF) 0.9% PF flush 0.5 mL     sodium chloride (PF) 0.9% PF flush 0.8 mL     sucrose (SWEET-EASE) solution 0.2-2 mL          Physical Exam   Age at exam: 0-hour old     Weight: 36%ile     Facies:  No dysmorphic features.   Head: Normocephalic. Anterior fontanelle soft, scalp clear. Sutures slightly overriding.  Ears: Pinnae normal for gestation. Canals present bilaterally.  Eyes: Red reflex bilaterally. No conjunctivitis.   Nose: Nares patent bilaterally.  Oropharynx: No cleft. Moist mucous membranes. No erythema or lesions.  Neck: Supple. No masses.  Clavicles: Normal without deformity or crepitus.  CV: Regular rate and rhythm. No murmur. Normal S1 and S2.  Peripheral/femoral pulses present, normal and symmetric. Extremities warm. Capillary refill < 3 seconds peripherally and centrally.   Lungs: Breath sounds dimished bilaterally.  Increased WOB, nasal flaring, and retractions noted on exam.  NCPAP +6 in place with improved WOB and O2 needs are at 21%.   Abdomen: Soft, non-tender, non-distended. No masses or hepatomegaly. Three vessel cord.  Back: Spine straight. Sacrum clear/intact, no dimple.   Male: Normal male genitalia. Testes descended bilaterally. No hypospadius.  Anus:  Normal position. Appears patent.   Extremities: Spontaneous movement of all four extremities.  Hips: Negative Ortolani. Negative Sultana.  Neuro: Active. Normal  and Vero reflexes. Normal suck. Tone appropriate for gestational age and symmetric bilaterally. No focal deficits.  Skin: No jaundice. No rashes or skin breakdown.       Communications   Parents:  Updated on admission.    PCPs:  Infant PCP: Physician No Ref-Primary  Maternal OB PCP:   Information for the patient's mother:  Sarah Cullen [[id number]]   No Ref-Primary, Physician   Delivering Provider:   Dr. Spence  Admission note routed to all.    Health Care Team:  Patient discussed with the care team. A/P,  imaging studies, laboratory data, medications and family situation reviewed.    Past Medical History   This patient has no significant past medical history       Family History - [place]   This patient has no significant family history       Maternal History   Information for the patient's mother:  Sarah Cullen [[id number]]     Patient Active Problem List   Diagnosis     Tension-type headache, not intractable, unspecified chronicity pattern     ROSEMARY (generalized anxiety disorder)     Exercise-induced asthma     Severe episode of recurrent major depressive disorder, without psychotic features (H)     Borderline personality disorder (H)     Mild intermittent asthma without complication     ASCUS on pap smear     Segmental dysfunction of thoracic region     Segmental dysfunction of cervical region     Segmental dysfunction of sacral region     Mechanical back pain     Fall     Encounter for triage in pregnant patient     Labor and delivery, indication for care             Social History -    This  has no significant social history       Allergies   All allergies reviewed and addressed       Review of Systems   Not applicable to this patient.          Physician Attestation        show

## 2023-07-31 NOTE — ED PROVIDER NOTE - OBJECTIVE STATEMENT
81 y/o f hx HTN, hypothyroidism  s/p mechanical fall at airport in [facility] apx 15 hours ago - wheeled suitcase fell over and pulled her down with it; struck face on floor.  Sustained left sided black eye and a small cut to the lower lip. No broken or loose teeth. Admits to mild pain over the left brow.  Was cleared medically at the airport before she was allowed to fly.  Last tetanus shot less than 1 year ago.  No LOC, vision changes, severe headache, N/V, neck/back pain, CP/palpitations, SOB/diff breathing, abd pain, pain to limbs.

## 2023-07-31 NOTE — ED PROVIDER NOTE - NSFOLLOWUPINSTRUCTIONS_ED_ALL_ED_FT
Take the antibiotic (doxycycline) as prescribed.  Follow up with facial plastic surgeon/ENT this week.  Return to the Emergency Department if you have any new or worsening symptoms, or if you have any concerns.  =====================  Maxillofacial Fracture  A maxillofacial fracture, also called a midface fracture, is a break in the bones of the middle part of the face that form the upper jaw (maxilla). These bones include:  The maxilla.  The cheekbones.  The lower rim and floor of the eye socket (inferior orbital or orbital floor).  The opening to the nasal passages (nasal cavity).  Sometimes, maxillofacial fractures involve multiple facial bones, also called complex fractures, and may partially or completely detach from the skull ([place] fractures). [place] fractures can be very severe and can be life-threatening.    What are the causes?  Maxillofacial fractures are usually caused by strong, blunt force to the midface area. Causes include:  Motor vehicle accidents.  Contact sports accidents.  Combat sports or martial arts.  Injuries in sports that use hard balls or bats.  Falls.  Workplace accidents.  Violent assaults.  What are the signs or symptoms?  Symptoms of this condition include:  Swelling, bruising, and pain, or tenderness of the face.  Bleeding or blood clots in the nose or mouth.  Clear drainage from the nose.  A change in the appearance of the face (facial deformity) and numbness.  A change in how the teeth fit together (malocclusion).  Inability to close the mouth at all.  Double vision, blurry vision, or inability to move the affected eye normally.  Bleeding into the lining of the eyelid or white area of the eye (subconjunctival hemorrhage).  Trouble breathing, swallowing, or speaking.  How is this diagnosed?  This condition may be diagnosed based on your symptoms and a physical exam. The exam involves checking for:  Airway blockage and any life-threatening bleeding.  Facial deformity, such as a gap or dent in the upper jaw that can be felt or moved, or widening and flattening of the face.  Vision problems.  Numbness or paralysis of eye muscles or facial muscles, or both.  Damage to the teeth and to the inside of the mouth and nose.  You may also have tests, such as X-rays or a CT scan, to confirm your diagnosis and determine how severe your fracture is.    How is this treated?  Early treatment    Treatment depends on how severe the fracture is and where it is found. Treatment may start in the emergency room to make sure blood clots or swelling do not block breathing. Treatments may include:  Endotracheal tube. This is a breathing tube that may be put through the nose into the windpipe.  Tracheostomy. This procedure involves making an opening in the lower windpipe to put in a breathing tube.  Antibiotic medicines, pain medicines, and medicines to reduce swelling.  Treatments specific to the type of fracture.  Maxillomandibular fixation    In most cases, this condition may require a procedure to wire the upper teeth to the lower teeth (maxillomandibular fixation). You may need to have your teeth wired together for several weeks to stabilize the fracture during healing. For minor fractures, this may be the only treatment needed.    Fractures that are out of position (displaced) or unstable may require open reduction. This is a surgery to move the broken bones back into position and then support them with plates and screws or wires.    Follow these instructions at home:  Medicines    Take over-the-counter and prescription medicines only as told by your health care provider.  Take your antibiotic medicine as told by your health care provider. Do not stop taking the antibiotic even if you start to feel better.  Maxillomandibular fixation care    If your teeth have been wired together:  Follow instructions for caring for your mouth and teeth (oral hygiene).  Make sure you know what to do if you vomit. You may be given a  to cut the wires off your teeth.  Follow instructions from your health care provider about eating or drinking restrictions. You will need to remain on a liquid and pureed food diet while your teeth are wired together.  Incision care    Two stitched incisions. One is normal. The other is red with pus and infected.  If you have facial incisions, follow instructions from your health care provider about how to take care of your incisions. Make sure you:  Wash your hands with soap and water for at least 20 seconds before and after you change your bandage (dressing). If soap and water are not available, use hand .  Change your dressing as told by your health care provider.  Leave stitches (sutures), skin glue, or adhesive strips in place. These skin closures may need to stay in place for 2 weeks or longer. If adhesive strip edges start to loosen and curl up, you may trim the loose edges. Do not remove adhesive strips completely unless your health care provider tells you to do that.  Check your incision area every day for signs of infection. Check for:  More redness, swelling, or pain.  More fluid or blood.  Warmth.  Pus or a bad smell.  Managing pain, stiffness, and swelling    A bag of ice on a towel on the skin.   If directed, put ice on the affected area. To do this:  Put ice in a plastic bag.  Place a towel between your skin and the bag.  Leave the ice on for 20 minutes, 2–3 times a day.  Remove the ice if your skin turns bright red. This is very important. If you cannot feel pain, heat, or cold, you have a greater risk of damage to the area.  Raise (elevate) your head above the level of your heart while you are sitting or lying down.  General instructions    Do not take baths, swim, or use a hot tub until your health care provider approves. Ask your health care provider if you may take showers. You may only be allowed to take sponge baths.  Return to your normal activities as told by your health care provider. Ask your health care provider what activities are safe for you.  Do not use any products that contain nicotine or tobacco. These products include cigarettes, chewing tobacco, and vaping devices, such as e-cigarettes. If you need help quitting, ask your health care provider.  Do not drink alcohol.  Do not lift anything that is heavier than 10 lb (4.5 kg), or the limit that you are told, until your health care provider says that it is safe.  Keep all follow-up visits. This is important. This includes visits to have sutures removed and wires cut from your teeth.  Contact a health care provider if:  You have chills or a fever.  You notice any signs of infection. These include redness, increased pain, or foul-smelling discharge.  Your pain is not controlled with medicine.  Get help right away if:  You have trouble breathing.  You have a sudden increase in swelling.  You need to cut the wires off your teeth because of vomiting.  These symptoms may represent a serious problem that is an emergency. Do not wait to see if the symptoms will go away. Get medical help right away. Call your local emergency services (911 in the [place] Do not drive yourself to the hospital.    Summary  A maxillofacial fracture is a break in the bones of the middle part of your face. This injury is usually caused by strong, blunt force to the midface area. Fractures in these bones may interfere with breathing, vision, chewing, and talking. Maxillofacial fractures can be very severe.  Treatment of maxillofacial fractures depends on the severity and location of the fracture. Treatment may start in the emergency room to make sure that blood clots or swelling are not blocking breathing.  In most cases, this condition may require a procedure to wire the upper teeth to the lower teeth (maxillomandibular fixation).  Severe fractures may require a surgical procedure (open reduction) to move the broken bones back into place and put in plates and screws or wires.  Follow all home care instructions and keep all follow-up visits.  This information is not intended to replace advice given to you by your health care provider. Make sure you discuss any questions you have with your health care provider.

## 2023-07-31 NOTE — ED ADULT NURSE NOTE - OBJECTIVE STATEMENT
Pt is a+ox4 c/o trip and [place] [employer]. Hematoma under L eye. Given ice on plane and told to come to hospital on landing. Denies LOC, n/v. Takes baby ASA daily

## 2023-07-31 NOTE — ED PROVIDER NOTE - PATIENT PORTAL LINK FT
You can access the FollowMyHealth Patient Portal offered by [facility] by registering at the following website: [email or web address]. By joining [employer]’s FollowMyHealth portal, you will also be able to view your health information using other applications (apps) compatible with our system.

## 2023-07-31 NOTE — ED PROVIDER NOTE - DISCHARGE REVIEW MATERIAL PRESENTED
. Gabapentin Pregnancy And Lactation Text: This medication is Pregnancy Category C and isn't considered safe during pregnancy. It is excreted in breast milk.

## 2023-07-31 NOTE — ED ADULT NURSE NOTE - NSFALLRISKINTERV_ED_ALL_ED

## 2023-07-31 NOTE — ED PROVIDER NOTE - CLINICAL SUMMARY MEDICAL DECISION MAKING FREE TEXT BOX
Mechanical fall 15 hours ago;  Left periorbital ecchymosis and small lac to lower lip; does not require repair. No evidence of thoracoabdominal injuries or injuries to limbs.  Tetanus up to date. No AC but takes aspirin. Will CT head, maxillofacial, c-spine.

## 2023-08-03 DIAGNOSIS — S01.511A LACERATION WITHOUT FOREIGN BODY OF LIP, INITIAL ENCOUNTER: ICD-10-CM

## 2023-08-03 DIAGNOSIS — Z79.82 LONG TERM (CURRENT) USE OF ASPIRIN: ICD-10-CM

## 2023-08-03 DIAGNOSIS — Z88.1 ALLERGY STATUS TO OTHER ANTIBIOTIC AGENTS STATUS: ICD-10-CM

## 2023-08-03 DIAGNOSIS — Z91.018 ALLERGY TO OTHER FOODS: ICD-10-CM

## 2023-08-03 DIAGNOSIS — Z88.2 ALLERGY STATUS TO SULFONAMIDES: ICD-10-CM

## 2023-08-03 DIAGNOSIS — Z88.0 ALLERGY STATUS TO PENICILLIN: ICD-10-CM
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
## 2023-08-03 DIAGNOSIS — E03.9 HYPOTHYROIDISM, UNSPECIFIED: ICD-10-CM

## 2023-08-03 DIAGNOSIS — S02.40DA MAXILLARY FRACTURE, LEFT SIDE, INITIAL ENCOUNTER FOR CLOSED FRACTURE: ICD-10-CM
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
## 2023-08-03 DIAGNOSIS — Z88.8 ALLERGY STATUS TO OTHER DRUGS, MEDICAMENTS AND BIOLOGICAL SUBSTANCES: ICD-10-CM

## 2023-08-03 DIAGNOSIS — I10 ESSENTIAL (PRIMARY) HYPERTENSION: ICD-10-CM

## 2023-08-04 ENCOUNTER — NON-APPOINTMENT (OUTPATIENT)
Age: 83
End: 2023-08-04

## 2023-08-04 ENCOUNTER — APPOINTMENT (OUTPATIENT)
Dept: INTERNAL MEDICINE | Facility: CLINIC | Age: 83
End: 2023-08-04
Payer: MEDICARE

## 2023-08-04 DIAGNOSIS — R42 DIZZINESS AND GIDDINESS: ICD-10-CM

## 2023-08-04 DIAGNOSIS — Z91.81 HISTORY OF FALLING: ICD-10-CM

## 2023-08-04 PROCEDURE — 99443: CPT | Mod: 95

## 2023-08-05 PROBLEM — R42 LIGHTHEADEDNESS: Status: ACTIVE | Noted: 2023-08-04

## 2023-08-07 ENCOUNTER — TRANSCRIPTION ENCOUNTER (OUTPATIENT)
Age: 83
End: 2023-08-07

## 2023-08-08 ENCOUNTER — APPOINTMENT (OUTPATIENT)
Dept: RHEUMATOLOGY | Facility: CLINIC | Age: 83
End: 2023-08-08
Payer: MEDICARE

## 2023-08-08 ENCOUNTER — APPOINTMENT (OUTPATIENT)
Dept: MRI IMAGING | Facility: CLINIC | Age: 83
End: 2023-08-08
Payer: MEDICARE

## 2023-08-08 VITALS
HEART RATE: 69 BPM | OXYGEN SATURATION: 96 % | WEIGHT: 140 LBS | SYSTOLIC BLOOD PRESSURE: 156 MMHG | BODY MASS INDEX: 24.8 KG/M2 | DIASTOLIC BLOOD PRESSURE: 75 MMHG | TEMPERATURE: 97.4 F | HEIGHT: 63 IN

## 2023-08-08 PROCEDURE — 99215 OFFICE O/P EST HI 40 MIN: CPT | Mod: 25

## 2023-08-08 PROCEDURE — 70551 MRI BRAIN STEM W/O DYE: CPT

## 2023-08-08 PROCEDURE — 36415 COLL VENOUS BLD VENIPUNCTURE: CPT

## 2023-08-09 NOTE — PHYSICAL EXAM
[General Appearance - Alert] : alert [General Appearance - In No Acute Distress] : in no acute distress [] : no respiratory distress [Respiration, Rhythm And Depth] : normal respiratory rhythm and effort [Exaggerated Use Of Accessory Muscles For Inspiration] : no accessory muscle use [Abnormal Walk] : normal gait [Oriented To Time, Place, And Person] : oriented to person, place, and time [Impaired Insight] : insight and judgment were intact [Affect] : the affect was normal [Mood] : the mood was normal [FreeTextEntry1] : DIP and PIP changes of the hands, no active synovitis,

## 2023-08-09 NOTE — ASSESSMENT
[FreeTextEntry1] : 82 year old woman with HTN, CKD 4, proteinuria, HKD, hypothyroidism, HLD on Zetia/repatha, did not tolerate statin due to myalgia.  Patient referred for evaluation of gouty arthritis.  Patient with nontophaceous gout with hyperuricemia, unable to tolerate multiple medications due to anaphylaxis secondary to allopurinol as well as colchicine.  Patient also previously treated with probenecid, although with improvement in uric acid levels, felt increasing flares while taking it, last dose in 2021.  Patient reports recent increase in gout flares starting in February until about May 2023, feels related to initiation of Sensipar, now since discontinued without flare since that time.  Patient takes prednisone as needed for acute gout flare treatment, as unable to tolerate colchicine due to previous anaphylactic reaction.  Previous rheumatologist discussed possible Krystexxa with patient, patient is concerned about possible side effects, not interested at this time in this treatment.  Will repeat uric acid level today off Sensipar, previous elevated rheumatoid factor will repeat today with anti-CCP antibody, although symptoms not suggestive of an inflammatory arthritis such as rheumatoid arthritis.  Discussed low-dose prednisone for prophylaxis, however limited by osteoporosis, discussed possible treatments for osteoporosis with patient, patient is not interested at this time given concern for side effects of medications.  Further management pending results.

## 2023-08-09 NOTE — REVIEW OF SYSTEMS
[Feeling Tired] : feeling tired [Arthralgias] : arthralgias [Negative] : Heme/Lymph [Joint Swelling] : no joint swelling [Joint Stiffness] : no joint stiffness [FreeTextEntry3] : ecchymosis resolving left side  [de-identified] : ecchymosis resolving of the face and neck
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
## 2023-08-18 LAB
ANION GAP SERPL CALC-SCNC: 15 MMOL/L
BUN SERPL-MCNC: 30 MG/DL
CALCIUM SERPL-MCNC: 11 MG/DL
CCP AB SER IA-ACNC: <8 UNITS
CHLORIDE SERPL-SCNC: 105 MMOL/L
CO2 SERPL-SCNC: 24 MMOL/L
CREAT SERPL-MCNC: 1.5 MG/DL
EGFR: 35 ML/MIN/1.73M2
GLUCOSE SERPL-MCNC: 92 MG/DL
POTASSIUM SERPL-SCNC: 5.1 MMOL/L
RF+CCP IGG SER-IMP: NEGATIVE
RHEUMATOID FACT SER QL: 51 IU/ML
SODIUM SERPL-SCNC: 144 MMOL/L
URATE SERPL-MCNC: 9.5 MG/DL

## 2023-08-23 ENCOUNTER — LABORATORY RESULT (OUTPATIENT)
Age: 83
End: 2023-08-23

## 2023-08-23 ENCOUNTER — APPOINTMENT (OUTPATIENT)
Dept: INTERNAL MEDICINE | Facility: CLINIC | Age: 83
End: 2023-08-23
Payer: MEDICARE

## 2023-08-23 VITALS
SYSTOLIC BLOOD PRESSURE: 152 MMHG | HEIGHT: 64 IN | RESPIRATION RATE: 18 BRPM | TEMPERATURE: 97.8 F | BODY MASS INDEX: 24.24 KG/M2 | DIASTOLIC BLOOD PRESSURE: 80 MMHG | HEART RATE: 68 BPM | OXYGEN SATURATION: 98 % | WEIGHT: 142 LBS
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
## 2023-08-23 DIAGNOSIS — Z87.81 PERSONAL HISTORY OF (HEALED) TRAUMATIC FRACTURE: ICD-10-CM

## 2023-08-23 DIAGNOSIS — N17.9 ACUTE KIDNEY FAILURE, UNSPECIFIED: ICD-10-CM

## 2023-08-23 DIAGNOSIS — M40.209 UNSPECIFIED KYPHOSIS, SITE UNSPECIFIED: ICD-10-CM

## 2023-08-23 DIAGNOSIS — Z00.00 ENCOUNTER FOR GENERAL ADULT MEDICAL EXAMINATION W/OUT ABNORMAL FINDINGS: ICD-10-CM

## 2023-08-23 PROCEDURE — G0439: CPT

## 2023-08-23 PROCEDURE — 36415 COLL VENOUS BLD VENIPUNCTURE: CPT

## 2023-08-23 PROCEDURE — G0444 DEPRESSION SCREEN ANNUAL: CPT

## 2023-08-23 RX ORDER — RAMIPRIL 5 MG/1
5 CAPSULE ORAL DAILY
Qty: 90 | Refills: 3 | Status: ACTIVE | COMMUNITY
Start: 1900-01-01 | End: 1900-01-01

## 2023-08-23 RX ORDER — AMLODIPINE BESYLATE 10 MG/1
10 TABLET ORAL DAILY
Qty: 90 | Refills: 3 | Status: ACTIVE | COMMUNITY
Start: 1900-01-01 | End: 1900-01-01

## 2023-08-23 RX ORDER — EZETIMIBE 10 MG/1
10 TABLET ORAL DAILY
Qty: 90 | Refills: 3 | Status: ACTIVE | COMMUNITY
Start: 2021-07-30 | End: 1900-01-01

## 2023-08-23 NOTE — HEALTH RISK ASSESSMENT
[Never] : Never [de-identified] :  I spent 5 minutes performing a depression screening on this patient.
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
## 2023-08-24 ENCOUNTER — TRANSCRIPTION ENCOUNTER (OUTPATIENT)
Age: 83
End: 2023-08-24

## 2023-08-24 LAB
25(OH)D3 SERPL-MCNC: 34.9 NG/ML
ALBUMIN SERPL ELPH-MCNC: 4.5 G/DL
ALP BLD-CCNC: 84 U/L
ALT SERPL-CCNC: 12 U/L
ANION GAP SERPL CALC-SCNC: 11 MMOL/L
APPEARANCE: CLEAR
AST SERPL-CCNC: 14 U/L
BACTERIA: ABNORMAL /HPF
BILIRUB SERPL-MCNC: 0.3 MG/DL
BILIRUBIN URINE: NEGATIVE
BLOOD URINE: NEGATIVE
BUN SERPL-MCNC: 35 MG/DL
CALCIUM SERPL-MCNC: 10.7 MG/DL
CALCIUM SERPL-MCNC: 10.7 MG/DL
CAST: 2 /LPF
CHLORIDE SERPL-SCNC: 105 MMOL/L
CHOLEST SERPL-MCNC: 162 MG/DL
CO2 SERPL-SCNC: 26 MMOL/L
COLOR: YELLOW
CREAT SERPL-MCNC: 1.37 MG/DL
EGFR: 39 ML/MIN/1.73M2
EPITHELIAL CELLS: 2 /HPF
ESTIMATED AVERAGE GLUCOSE: 114 MG/DL
GLUCOSE QUALITATIVE U: NEGATIVE MG/DL
GLUCOSE SERPL-MCNC: 86 MG/DL
HBA1C MFR BLD HPLC: 5.6 %
HDLC SERPL-MCNC: 95 MG/DL
KETONES URINE: NEGATIVE MG/DL
LDLC SERPL CALC-MCNC: 55 MG/DL
LEUKOCYTE ESTERASE URINE: ABNORMAL
MICROSCOPIC-UA: NORMAL
NITRITE URINE: POSITIVE
NONHDLC SERPL-MCNC: 67 MG/DL
PARATHYROID HORMONE INTACT: 112 PG/ML
PH URINE: 6
POTASSIUM SERPL-SCNC: 4.5 MMOL/L
PROT SERPL-MCNC: 6.4 G/DL
PROTEIN URINE: NEGATIVE MG/DL
RED BLOOD CELLS URINE: 1 /HPF
SODIUM SERPL-SCNC: 142 MMOL/L
SPECIFIC GRAVITY URINE: 1.01
TRIGL SERPL-MCNC: 60 MG/DL
TSH SERPL-ACNC: 0.02 UIU/ML
URATE SERPL-MCNC: 8.5 MG/DL
UROBILINOGEN URINE: 0.2 MG/DL
WHITE BLOOD CELLS URINE: 3 /HPF

## 2023-08-24 RX ORDER — LEVOTHYROXINE SODIUM 0.07 MG/1
75 TABLET ORAL DAILY
Qty: 90 | Refills: 3 | Status: ACTIVE | COMMUNITY
Start: 2021-08-06 | End: 1900-01-01

## 2023-08-29 NOTE — ASSESSMENT
Can do trial of trazodone 50 mg nightly and can increase up by 1/2 tablet-1 tablet in 1 week if no improvement in symptoms. Pt should reach out with any adverse side effects such as hangover effect, grogginess, etc.    [FreeTextEntry1] : all lab data was reviewed with patient in detail from 1/31/2023\par 81 yo woman with CKD 4, Gout, HTN, HLD, Hypothyroidism, obesity, arthritis, multiple drug allergies  \par -gout- no new gout attacks;\par -HTN- BP at goal; losing weight and exercising daily \par -CKD 4-  clinically stable; creat 1.7- stable range; electrolytes and volume status acceptable- avoiding NSAIDS\par HLD-  controlled on repatha\par -hyperparathyroidism- c/w tertiary hyperpara given elevated calcium- will add sensipar given calcium >11-> to repeat chem panel 3 weeks after starting sensipar \par -arthritis- for Rheum f/u\par \par f/u 4-6 months\par

## 2023-09-09 ENCOUNTER — EMERGENCY (EMERGENCY)
Facility: HOSPITAL | Age: 83
LOS: 1 days | Discharge: HOME | End: 2023-09-09
Admitting: EMERGENCY MEDICINE
Payer: MEDICARE

## 2023-09-09 VITALS
HEIGHT: 64 IN | OXYGEN SATURATION: 98 % | HEART RATE: 88 BPM | RESPIRATION RATE: 18 BRPM | TEMPERATURE: 98 F | SYSTOLIC BLOOD PRESSURE: 152 MMHG | DIASTOLIC BLOOD PRESSURE: 72 MMHG | WEIGHT: 141.98 LBS

## 2023-09-09 DIAGNOSIS — Z90.710 ACQUIRED ABSENCE OF BOTH CERVIX AND UTERUS: Chronic | ICD-10-CM

## 2023-09-09 DIAGNOSIS — M25.562 PAIN IN LEFT KNEE: ICD-10-CM

## 2023-09-09 DIAGNOSIS — Z90.710 ACQUIRED ABSENCE OF BOTH CERVIX AND UTERUS: ICD-10-CM

## 2023-09-09 DIAGNOSIS — Z88.8 ALLERGY STATUS TO OTHER DRUGS, MEDICAMENTS AND BIOLOGICAL SUBSTANCES: ICD-10-CM

## 2023-09-09 DIAGNOSIS — Z88.1 ALLERGY STATUS TO OTHER ANTIBIOTIC AGENTS STATUS: ICD-10-CM

## 2023-09-09 DIAGNOSIS — M10.9 GOUT, UNSPECIFIED: ICD-10-CM

## 2023-09-09 DIAGNOSIS — Z91.018 ALLERGY TO OTHER FOODS: ICD-10-CM

## 2023-09-09 DIAGNOSIS — E03.9 HYPOTHYROIDISM, UNSPECIFIED: ICD-10-CM

## 2023-09-09 DIAGNOSIS — I10 ESSENTIAL (PRIMARY) HYPERTENSION: ICD-10-CM

## 2023-09-09 DIAGNOSIS — M19.90 UNSPECIFIED OSTEOARTHRITIS, UNSPECIFIED SITE: ICD-10-CM

## 2023-09-09 DIAGNOSIS — Z88.2 ALLERGY STATUS TO SULFONAMIDES: ICD-10-CM

## 2023-09-09 DIAGNOSIS — Z88.0 ALLERGY STATUS TO PENICILLIN: ICD-10-CM

## 2023-09-09 DIAGNOSIS — Z79.82 LONG TERM (CURRENT) USE OF ASPIRIN: ICD-10-CM

## 2023-09-09 PROCEDURE — 99284 EMERGENCY DEPT VISIT MOD MDM: CPT

## 2023-09-09 PROCEDURE — 93971 EXTREMITY STUDY: CPT | Mod: 26,LT

## 2023-09-09 NOTE — ED ADULT NURSE NOTE - CHIEF COMPLAINT QUOTE
Pt reports posterior L knee pain since yesterday. pt reports pain worse with moving. Pt denies any swelling/redness. Pt was see at [facility] and sent here for further eval.

## 2023-09-09 NOTE — ED ADULT NURSE NOTE - OBJECTIVE STATEMENT
developed l-knee pain yesterday when ironing, no trauma, progressively worse today when cycling, no pain at rest, no erythema, trace edema, says this is baseline for her, homans negative

## 2023-09-09 NOTE — ED ADULT TRIAGE NOTE - CHIEF COMPLAINT QUOTE
Pt reports posterior L knee pain since yesterday. pt reports pain worse with moving. Pt denies any swelling/redness. Pt was see at [facility] and sent here for further eval.

## 2023-09-10 VITALS
DIASTOLIC BLOOD PRESSURE: 69 MMHG | RESPIRATION RATE: 18 BRPM | OXYGEN SATURATION: 97 % | HEART RATE: 60 BPM | SYSTOLIC BLOOD PRESSURE: 117 MMHG | TEMPERATURE: 98 F

## 2023-09-10 PROCEDURE — 73564 X-RAY EXAM KNEE 4 OR MORE: CPT

## 2023-09-10 PROCEDURE — 73564 X-RAY EXAM KNEE 4 OR MORE: CPT | Mod: 26,LT

## 2023-09-10 PROCEDURE — 99284 EMERGENCY DEPT VISIT MOD MDM: CPT | Mod: 25

## 2023-09-10 PROCEDURE — 93971 EXTREMITY STUDY: CPT

## 2023-09-10 NOTE — ED PROVIDER NOTE - CLINICAL SUMMARY MEDICAL DECISION MAKING FREE TEXT BOX
83 y/o female w/ hx htn, hypothyroidism, arthritis p/w L posterior knee pain x 2 days.  Denies known trauma/ injury.   States rides bicycle regularly.  Has known arthritis and swelling in L knee at baseline.  Went to  today, who sent pt to ED to r/o DVT.  Pt traveled to [place] in July.  Denies other travel.  Denies smoking, exogenous estrogen use, or hx dvt/pe.  Denies f/c, cp, sob, numbness/tingling/weakness to ext, calf pain.  Exam with swelling to L knee and mild posterior knee ttp  Suspect likely msk pain.    Will get XR  Low suspicion for DVT.  Pt requesting doppler, will order to r/o  Pain control prn - pt declining

## 2023-09-10 NOTE — ED PROVIDER NOTE - OBJECTIVE STATEMENT
81 y/o female w/ hx htn, hypothyroidism, arthritis p/w L posterior knee pain x 2 days.  Denies known trauma/ injury.   States rides bicycle regularly.  Has known arthritis and swelling in L knee at baseline.  Went to  today, who sent pt to ED to r/o DVT.  Pt traveled to [place] in July.  Denies other travel.  Denies smoking, exogenous estrogen use, or hx dvt/pe.  Denies f/c, cp, sob, numbness/tingling/weakness to ext, calf pain.

## 2023-09-10 NOTE — ED PROVIDER NOTE - NSFOLLOWUPINSTRUCTIONS_ED_ALL_ED_FT
Thank you for visiting [facility] Emergency Department.      We saw you today for knee pain.    PAIN CONTROL:   You may take ibuprofen (Motrin, Advil) 400 mg (2 regular tablets) every 6 hours as needed for pain.  Please take with food.  Stop taking if you develop abdominal pain, dark/ bloody stools.  Do not mix with other NSAIDS (ie. Naproxen, Aleve, Celecoxib).  You may also take acetaminophen (Tylenol) 650-975mg (2-3 regular tablets) or 500-1000mg (1-2 extra strength tablets) every 6 hours as needed for pain.  Do not exceed 4000 mg in 1 day. These medications may be bought over the counter.    I recommend alternating the Ibuprofen and Tylenol so you are getting medications around the clock.  For example take the Ibuprofen, then 3 hours later take the Tylenol, then 3 hours later take the Ibuprofen, and repeat as needed.    Rest. Apply ice to affected area 20 minutes on, then 20 minutes off.  You may repeat throughout the day.  Please wear ACE wrap as instructed. Elevate affected extremity.    Please follow up with your orthopedist in 1 week for re-evaluation.   Please know that no emergency visit is complete without follow-up with your primary care provider in 1 week.  Please bring copies of all discharge papers and results and show to your doctor.      Please continue taking all previous medications as instructed unless we discussed otherwise.     I appreciated your patience and hope you feel better soon.     Return to ER immediately if you develop fevers, chills, chest pain, shortness of breath, worsening and/or any concerning symptoms.

## 2023-09-10 NOTE — ED PROVIDER NOTE - PHYSICAL EXAMINATION
CONSTITUTIONAL: Awake, alert.  Nontoxic, no acute distress.    HEAD: Normocephalic, atraumatic.    LUNGS:  No acute respiratory distress.  Non-tachypneic and non-labored.  Speaking in full clear sentences    MUSCULOSKELETAL:  L knee swollen.  No erythema.  Minimal ttp to posterior L knee.  FROM b/l lower extremities.  5/5 strength b/l lower ext.  Sensation and motor function grossly intact.  Strong equal peripheral pulses b/l.   Cap refill < 2 b/l upper and lower ext.  All compartments soft.  No joint laxity to knee.  No calf ttp    SKIN: Skin in warm, dry and intact without rashes or lesions.  Appropriate color for ethnicity.    NEUROLOGICAL:  Patient is alert, oriented x person, place and time.    PSYCH: Appropriate mood and affect. Good judgment and insight.

## 2023-09-10 NOTE — ED PROVIDER NOTE - NS ED ROS FT
CONSTITUTIONAL: Denies fever and chills    RESPIRATORY: Denies SOB    CARDIOVASCULAR: Denies chest pain.    MUSCULOSKELETAL: See HPI

## 2023-09-10 NOTE — ED PROVIDER NOTE - PATIENT PORTAL LINK FT
You can access the FollowMyHealth Patient Portal offered by [facility] by registering at the following website: [email or web address]. By joining [employer]’s FollowMyHealth portal, you will also be able to view your health information using other applications (apps) compatible with our system.

## 2023-09-14 ENCOUNTER — OFFICE (OUTPATIENT)
Dept: URBAN - METROPOLITAN AREA CLINIC 28 | Facility: CLINIC | Age: 83
Setting detail: OPHTHALMOLOGY
End: 2023-09-14
Payer: MEDICARE

## 2023-09-14 ENCOUNTER — RX ONLY (RX ONLY)
Age: 83
End: 2023-09-14

## 2023-09-14 DIAGNOSIS — H01.004: ICD-10-CM

## 2023-09-14 DIAGNOSIS — H47.233: ICD-10-CM

## 2023-09-14 DIAGNOSIS — H16.223: ICD-10-CM

## 2023-09-14 DIAGNOSIS — H25.13: ICD-10-CM

## 2023-09-14 DIAGNOSIS — H40.013: ICD-10-CM

## 2023-09-14 DIAGNOSIS — H40.063: ICD-10-CM

## 2023-09-14 DIAGNOSIS — H01.001: ICD-10-CM

## 2023-09-14 PROCEDURE — 92002 INTRM OPH EXAM NEW PATIENT: CPT | Performed by: OPHTHALMOLOGY

## 2023-09-14 PROCEDURE — 92250 FUNDUS PHOTOGRAPHY W/I&R: CPT | Performed by: OPHTHALMOLOGY

## 2023-09-14 ASSESSMENT — TEAR BREAK UP TIME (TBUT)
OD_TBUT: 1+
OS_TBUT: 1+

## 2023-09-14 ASSESSMENT — VISUAL ACUITY
OD_BCVA: 20/60-1
OS_BCVA: 20/60-1

## 2023-09-14 ASSESSMENT — PACHYMETRY
OS_CT_CORRECTION: -3
OD_CT_UM: 576
OD_CT_CORRECTION: -2
OS_CT_UM: 583

## 2023-09-14 ASSESSMENT — TONOMETRY
OS_IOP_MMHG: 20
OD_IOP_MMHG: 20

## 2023-09-14 ASSESSMENT — KERATOMETRY
OS_K1POWER_DIOPTERS: 44.00
OS_K2POWER_DIOPTERS: 44.50
METHOD_AUTO_MANUAL: AUTO
OD_K2POWER_DIOPTERS: 44.75
OS_AXISANGLE_DEGREES: 081
OD_K1POWER_DIOPTERS: 44.00
OD_AXISANGLE_DEGREES: 085

## 2023-09-14 ASSESSMENT — LID EXAM ASSESSMENTS
OS_BLEPHARITIS: LUL 1+ 2+
OD_BLEPHARITIS: RUL 1+ 2+

## 2023-09-14 ASSESSMENT — CONFRONTATIONAL VISUAL FIELD TEST (CVF)
OS_FINDINGS: FULL
OD_FINDINGS: FULL

## 2023-10-16 ENCOUNTER — APPOINTMENT (OUTPATIENT)
Dept: HEART AND VASCULAR | Facility: CLINIC | Age: 83
End: 2023-10-16
Payer: MEDICARE

## 2023-10-16 VITALS
SYSTOLIC BLOOD PRESSURE: 158 MMHG | HEIGHT: 64 IN | DIASTOLIC BLOOD PRESSURE: 80 MMHG | WEIGHT: 145 LBS | HEART RATE: 67 BPM | BODY MASS INDEX: 24.75 KG/M2

## 2023-10-16 PROCEDURE — 93000 ELECTROCARDIOGRAM COMPLETE: CPT

## 2023-10-16 PROCEDURE — 99215 OFFICE O/P EST HI 40 MIN: CPT

## 2023-10-16 RX ORDER — EVOLOCUMAB 140 MG/ML
140 INJECTION, SOLUTION SUBCUTANEOUS
Qty: 3 | Refills: 3 | Status: ACTIVE | OUTPATIENT
Start: 2022-04-05

## 2023-10-24 ENCOUNTER — APPOINTMENT (OUTPATIENT)
Dept: NEPHROLOGY | Facility: CLINIC | Age: 83
End: 2023-10-24

## 2023-10-26 ENCOUNTER — APPOINTMENT (OUTPATIENT)
Dept: INTERNAL MEDICINE | Facility: CLINIC | Age: 83
End: 2023-10-26
Payer: MEDICARE

## 2023-10-26 VITALS
SYSTOLIC BLOOD PRESSURE: 160 MMHG | BODY MASS INDEX: 25.23 KG/M2 | DIASTOLIC BLOOD PRESSURE: 84 MMHG | OXYGEN SATURATION: 97 % | RESPIRATION RATE: 18 BRPM | WEIGHT: 147 LBS | HEART RATE: 63 BPM

## 2023-10-26 PROCEDURE — 99212 OFFICE O/P EST SF 10 MIN: CPT | Mod: 25

## 2023-10-26 PROCEDURE — 36415 COLL VENOUS BLD VENIPUNCTURE: CPT

## 2023-10-27 LAB — TSH SERPL-ACNC: 0.56 UIU/ML

## 2023-11-01 ASSESSMENT — KERATOMETRY
METHOD_AUTO_MANUAL: AUTO
OD_AXISANGLE_DEGREES: 085
OD_K2POWER_DIOPTERS: 44.75
OD_K1POWER_DIOPTERS: 44.00
OS_AXISANGLE_DEGREES: 081
OS_K1POWER_DIOPTERS: 44.00
OS_K2POWER_DIOPTERS: 44.50

## 2023-11-14 ENCOUNTER — APPOINTMENT (OUTPATIENT)
Dept: NEPHROLOGY | Facility: CLINIC | Age: 83
End: 2023-11-14
Payer: MEDICARE

## 2023-11-14 VITALS — DIASTOLIC BLOOD PRESSURE: 74 MMHG | HEART RATE: 70 BPM | SYSTOLIC BLOOD PRESSURE: 140 MMHG

## 2023-11-14 DIAGNOSIS — N18.9 CHRONIC KIDNEY DISEASE, UNSPECIFIED: ICD-10-CM

## 2023-11-14 DIAGNOSIS — D63.1 CHRONIC KIDNEY DISEASE, UNSPECIFIED: ICD-10-CM

## 2023-11-14 PROCEDURE — 93784 AMBL BP MNTR W/SOFTWARE: CPT

## 2023-11-17 ENCOUNTER — APPOINTMENT (OUTPATIENT)
Dept: INTERNAL MEDICINE | Facility: CLINIC | Age: 83
End: 2023-11-17
Payer: MEDICARE

## 2023-11-17 DIAGNOSIS — U07.1 COVID-19: ICD-10-CM

## 2023-11-17 PROCEDURE — 99442: CPT | Mod: 95

## 2023-12-06 LAB
ALBUMIN SERPL ELPH-MCNC: 4.3 G/DL
ANION GAP SERPL CALC-SCNC: 12 MMOL/L
APPEARANCE: CLEAR
BACTERIA: ABNORMAL /HPF
BASOPHILS # BLD AUTO: 0.02 K/UL
BASOPHILS NFR BLD AUTO: 0.2 %
BILIRUBIN URINE: NEGATIVE
BLOOD URINE: ABNORMAL
BUN SERPL-MCNC: 29 MG/DL
CALCIUM SERPL-MCNC: 10.5 MG/DL
CALCIUM SERPL-MCNC: 10.5 MG/DL
CHLORIDE SERPL-SCNC: 106 MMOL/L
CO2 SERPL-SCNC: 25 MMOL/L
COLOR: YELLOW
CREAT SERPL-MCNC: 1.55 MG/DL
CREAT SPEC-SCNC: 157 MG/DL
EGFR: 33 ML/MIN/1.73M2
EOSINOPHIL # BLD AUTO: 0.09 K/UL
EOSINOPHIL NFR BLD AUTO: 1.1 %
GLUCOSE QUALITATIVE U: NEGATIVE MG/DL
GLUCOSE SERPL-MCNC: 84 MG/DL
HCT VFR BLD CALC: 36 %
HGB BLD-MCNC: 11.2 G/DL
HYALINE CASTS: PRESENT
IMM GRANULOCYTES NFR BLD AUTO: 0.4 %
IRON SATN MFR SERPL: 11 %
IRON SERPL-MCNC: 36 UG/DL
KETONES URINE: NEGATIVE MG/DL
LEUKOCYTE ESTERASE URINE: ABNORMAL
LYMPHOCYTES # BLD AUTO: 1.95 K/UL
LYMPHOCYTES NFR BLD AUTO: 23 %
MAN DIFF?: NORMAL
MCHC RBC-ENTMCNC: 29.9 PG
MCHC RBC-ENTMCNC: 31.1 GM/DL
MCV RBC AUTO: 96 FL
MICROALBUMIN 24H UR DL<=1MG/L-MCNC: 18.2 MG/DL
MICROALBUMIN/CREAT 24H UR-RTO: 116 MG/G
MICROSCOPIC-UA: NORMAL
MONOCYTES # BLD AUTO: 1.12 K/UL
MONOCYTES NFR BLD AUTO: 13.2 %
NEUTROPHILS # BLD AUTO: 5.28 K/UL
NEUTROPHILS NFR BLD AUTO: 62.1 %
NITRITE URINE: NEGATIVE
PARATHYROID HORMONE INTACT: 188 PG/ML
PH URINE: 6
PHOSPHATE SERPL-MCNC: 3.8 MG/DL
PLATELET # BLD AUTO: 289 K/UL
POTASSIUM SERPL-SCNC: 4.3 MMOL/L
PROTEIN URINE: 100 MG/DL
RBC # BLD: 3.75 M/UL
RBC # FLD: 15 %
RED BLOOD CELLS URINE: 5 /HPF
SODIUM SERPL-SCNC: 144 MMOL/L
SPECIFIC GRAVITY URINE: 1.02
SQUAMOUS EPITHELIAL CELLS: PRESENT
TIBC SERPL-MCNC: 337 UG/DL
UIBC SERPL-MCNC: 301 UG/DL
URATE SERPL-MCNC: 8.4 MG/DL
URINE COMMENTS: NORMAL
UROBILINOGEN URINE: 1 MG/DL
WBC # FLD AUTO: 8.49 K/UL
WHITE BLOOD CELLS URINE: 339 /HPF

## 2023-12-10 ASSESSMENT — REFRACTION_CURRENTRX
OS_OVR_VA: 20/
OD_CYLINDER: 0.00
OS_SPHERE: +2.00
OD_OVR_VA: 20/
OS_CYLINDER: -0.50
OS_AXIS: 064
OS_AXIS: 180
OS_AXIS: 066
OS_OVR_VA: 20/
OD_AXIS: 180
OD_OVR_VA: 20/
OS_SPHERE: +4.50
OD_OVR_VA: 20/
OD_CYLINDER: 0.00
OD_AXIS: 180
OD_SPHERE: +4.00
OS_SPHERE: +4.00
OS_OVR_VA: 20/
OD_SPHERE: +2.50
OD_AXIS: 180
OD_SPHERE: +5.00
OS_CYLINDER: -0.50

## 2023-12-10 ASSESSMENT — KERATOMETRY
OS_K1POWER_DIOPTERS: 44.00
METHOD_AUTO_MANUAL: AUTO
OS_AXISANGLE_DEGREES: 081
OD_K1POWER_DIOPTERS: 44.00
OD_AXISANGLE_DEGREES: 085
OD_K2POWER_DIOPTERS: 44.75
OS_K2POWER_DIOPTERS: 44.50

## 2023-12-10 ASSESSMENT — AXIALLENGTH_DERIVED
OS_AL: 22.717
OD_AL: 22.3639

## 2023-12-10 ASSESSMENT — REFRACTION_AUTOREFRACTION
OD_AXIS: 107
OS_CYLINDER: -0.75
OS_SPHERE: +2.00
OD_SPHERE: +2.75
OD_CYLINDER: -0.50
OS_AXIS: 077

## 2023-12-10 ASSESSMENT — SPHEQUIV_DERIVED
OD_SPHEQUIV: 2.5
OS_SPHEQUIV: 1.625

## 2023-12-11 ENCOUNTER — APPOINTMENT (OUTPATIENT)
Dept: NEPHROLOGY | Facility: CLINIC | Age: 83
End: 2023-12-11
Payer: MEDICARE

## 2023-12-11 VITALS — SYSTOLIC BLOOD PRESSURE: 140 MMHG | DIASTOLIC BLOOD PRESSURE: 70 MMHG | HEART RATE: 70 BPM

## 2023-12-11 DIAGNOSIS — R80.9 PROTEINURIA, UNSPECIFIED: ICD-10-CM

## 2023-12-11 PROCEDURE — 99214 OFFICE O/P EST MOD 30 MIN: CPT

## 2023-12-13 NOTE — HISTORY OF PRESENT ILLNESS
[FreeTextEntry1] : 82 yo woman here for f/u evaluation of CKD 4, gout HTN, HLD, hyperparathyroidism, hypothyroid, multiple drug allergies and recent acute gout attack. Very upset today, tearful,very close friend sick in hospital with confusion, underlying dx of leukemia- she can't find his next of kin- very stressful that she is in position of medical decisions completed ABPM 11/14/2023  dipping 14-16%- daytime BPs above target Prior, did have a wonderful time on her cruise on [place] gout controlled, but still using prednisone- for rheum f/u  5/5/2023 note: Recently inpatient at [facility] for SBO- (known recurrent bowel obstructions) resolved with conservative management- no surgery- did need Morphine towards end of her hospital stay developed an acute gout attack-  was dc'd home on prednisone on 5/3- spoke with NP there prior her dc.- 20 mg  on day 1, then dropped to 10 mg yesterday- now at 7.5 mg- will continue taper by 2.5 mg every day or so. she read somewhere that cinacalcet can precipitate gout attacks, so she dc'd  prior elevated tryptase levels - no need for heme f/u per heme.  2/2022 note: did test + rheumatoid factor- Low back pain, from ? arthritis- going to PT- helpful-  Prefer her to start [id number]- she declines over concern about UTIs  reminder from prior note: ramipril was changed to metoprolol (elevated mast cells) in beginning of August; subsequently developed depressive symptoms- metoprolol dc'd and HCTZ added, initially 25 mg, then reduced to 1/2 tablet daily. depression resolved.

## 2023-12-13 NOTE — ASSESSMENT
[FreeTextEntry1] : all lab data was reviewed with patient in detail from 12/5/2023 84 yo woman with CKD 4, gout, pseudogout on recent hospitalization, HTN, HLD, hypothyroid, arthritis very stressed today, as above; remains very active- using bicycle for her mode of transportation -HTN -CKD 4-  creat 1.55, within stable range -albuminuria Ualb/creat 116- up tick- monitoring -gout UA 8.4- stable - hyperparathyroidism - PTH up to 188-  defer Vit D for now as has h/o hypercalcemia- c/w tertiary hyperpara is not taking cinacalcet at this time -gout- no changes -advised rheum f/u -HLD- controlled with repatha -arthritis- refer to rheum  f/u 4-6 months

## 2023-12-14 ENCOUNTER — OFFICE (OUTPATIENT)
Dept: URBAN - METROPOLITAN AREA CLINIC 28 | Facility: CLINIC | Age: 83
Setting detail: OPHTHALMOLOGY
End: 2023-12-14
Payer: MEDICARE

## 2023-12-14 ENCOUNTER — APPOINTMENT (OUTPATIENT)
Dept: RHEUMATOLOGY | Facility: CLINIC | Age: 83
End: 2023-12-14
Payer: MEDICARE

## 2023-12-14 DIAGNOSIS — H16.223: ICD-10-CM

## 2023-12-14 DIAGNOSIS — H40.063: ICD-10-CM

## 2023-12-14 DIAGNOSIS — H40.013: ICD-10-CM

## 2023-12-14 DIAGNOSIS — M19.90 UNSPECIFIED OSTEOARTHRITIS, UNSPECIFIED SITE: ICD-10-CM

## 2023-12-14 DIAGNOSIS — H47.233: ICD-10-CM

## 2023-12-14 DIAGNOSIS — H25.13: ICD-10-CM

## 2023-12-14 DIAGNOSIS — E79.0 HYPERURICEMIA W/OUT SIGNS OF INFLAMMATORY ARTHRITIS AND TOPHACEOUS DISEASE: ICD-10-CM
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
## 2023-12-14 DIAGNOSIS — H02.403: ICD-10-CM

## 2023-12-14 DIAGNOSIS — H01.004: ICD-10-CM

## 2023-12-14 DIAGNOSIS — H01.001: ICD-10-CM

## 2023-12-14 PROCEDURE — 92020 GONIOSCOPY: CPT | Performed by: OPHTHALMOLOGY

## 2023-12-14 PROCEDURE — 92014 COMPRE OPH EXAM EST PT 1/>: CPT | Performed by: OPHTHALMOLOGY

## 2023-12-14 PROCEDURE — 55555 MISCELLANEOUS CHARGES: CPT | Performed by: OPHTHALMOLOGY

## 2023-12-14 PROCEDURE — 99443: CPT | Mod: 95

## 2023-12-14 ASSESSMENT — TEAR BREAK UP TIME (TBUT)
OS_TBUT: 2+ 3+
OD_TBUT: 2+ 3+

## 2023-12-14 ASSESSMENT — REFRACTION_AUTOREFRACTION
OS_AXIS: 069
OD_AXIS: 175
OS_CYLINDER: -0.25
OS_SPHERE: +2.00
OD_SPHERE: +2.50
OD_CYLINDER: -0.25

## 2023-12-14 ASSESSMENT — CONFRONTATIONAL VISUAL FIELD TEST (CVF)
OS_FINDINGS: FULL
OD_FINDINGS: FULL

## 2023-12-14 ASSESSMENT — REFRACTION_CURRENTRX
OD_CYLINDER: 0.00
OD_AXIS: 180
OD_SPHERE: +2.50
OS_SPHERE: +4.50
OS_CYLINDER: -0.50
OS_OVR_VA: 20/
OD_OVR_VA: 20/
OD_OVR_VA: 20/
OS_SPHERE: +4.00
OD_CYLINDER: 0.00
OD_AXIS: 180
OD_AXIS: 180
OS_AXIS: 066
OS_OVR_VA: 20/
OS_OVR_VA: 20/
OS_AXIS: 064
OD_SPHERE: +5.00
OD_SPHERE: +4.00
OS_CYLINDER: -0.50
OS_SPHERE: +2.00
OS_AXIS: 180
OD_OVR_VA: 20/

## 2023-12-14 ASSESSMENT — LID EXAM ASSESSMENTS
OS_BLEPHARITIS: LUL 1+ 2+
OD_BLEPHARITIS: RUL 1+ 2+

## 2023-12-14 ASSESSMENT — LID POSITION - PTOSIS
OD_PTOSIS: RUL 2+ 3+
OS_PTOSIS: LUL 1+ 2+

## 2023-12-14 ASSESSMENT — SPHEQUIV_DERIVED
OD_SPHEQUIV: 2.375
OS_SPHEQUIV: 1.875
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
## 2023-12-14 NOTE — ASSESSMENT
[FreeTextEntry1] : 83 year old woman with HTN, CKD 4, proteinuria, HKD, hypothyroidism, HLD on Zetia/repatha, did not tolerate statin due to myalgia. Patient referred for follow up of gouty arthritis. Patient with nontophaceous gout with hyperuricemia, unable to tolerate multiple medications due to anaphylaxis secondary to allopurinol as well as colchicine. Patient also previously treated with probenecid, although with improvement in uric acid levels, felt increasing flares while taking it, last dose in 2021. Patient reports previous increase in gout flares starting in February until about May 2023, feels related to initiation of Sensipar, now since discontinued, with minimal flares since that time.  Patient takes prednisone as needed for acute gout flare treatment, as unable to tolerate colchicine due to previous anaphylactic reaction, few courses since the spring of 2023.  Previous rheumatologist discussed possible Krystexxa with patient, patient is concerned about possible side effects, not interested at this time in this treatment, 12/5/2023 uric acid 8.4. Previous labs elevated rheumatoid factor, anti-CCP antibody negative, although symptoms not suggestive of an inflammatory arthritis such as rheumatoid arthritis. Feels improvement in joint symptoms with tumeric, had not been taking regualrly recently with an increase in arthralgias, now feels improving with more regular use. Discussed low-dose prednisone for gout prophylaxis, however limited by osteoporosis, discussed possible treatments for osteoporosis with patient, patient is not interested at this time given concern for side effects of medications. Patient will follow up in four months in person or sooner as needed.

## 2024-01-22 ENCOUNTER — APPOINTMENT (OUTPATIENT)
Dept: NEUROLOGY | Facility: CLINIC | Age: 84
End: 2024-01-22

## 2024-01-31 NOTE — ED ADULT NURSE NOTE - NURSING ED SKIN COLOR
- Follow up with your doctor within 2-3 days.   - Return to the ED for any new or worsening symptoms including but not limited to worsening redness, swelling, pain, pus drainage, fevers, etc.  - Keep areas clean and dry  - May rinse with soap and water, do not rub or scrub sutures  - Return to ED in 7-10 days for suture removal  - May remove dressing tomorrow night  - Follow-up with hand specialist within 1 week for further evaluation    Laceration    A laceration is a cut that goes through all of the layers of the skin and into the tissue that is right under the skin. Some lacerations heal on their own. Others need to be closed with skin adhesive strips, skin glue, stitches (sutures), or staples. Proper laceration care minimizes the risk of infection and helps the laceration to heal better.  If non-absorbable stitches or staples have been placed, they must be taken out within the time frame instructed by your healthcare provider.    SEEK IMMEDIATE MEDICAL CARE IF YOU HAVE ANY OF THE FOLLOWING SYMPTOMS: swelling around the wound, worsening pain, drainage from the wound, red streaking going away from your wound, inability to move finger or toe near the laceration, or discoloration of skin near the laceration.
normal for race

## 2024-02-15 PROBLEM — Z78.9 STATIN INTOLERANCE: Status: ACTIVE | Noted: 2022-04-05

## 2024-02-16 ENCOUNTER — APPOINTMENT (OUTPATIENT)
Dept: HEART AND VASCULAR | Facility: CLINIC | Age: 84
End: 2024-02-16
Payer: MEDICARE

## 2024-02-16 VITALS
BODY MASS INDEX: 24.75 KG/M2 | HEART RATE: 72 BPM | DIASTOLIC BLOOD PRESSURE: 72 MMHG | WEIGHT: 145 LBS | HEIGHT: 64 IN | SYSTOLIC BLOOD PRESSURE: 138 MMHG

## 2024-02-16 DIAGNOSIS — I27.20 PULMONARY HYPERTENSION, UNSPECIFIED: ICD-10-CM

## 2024-02-16 DIAGNOSIS — Z78.9 OTHER SPECIFIED HEALTH STATUS: ICD-10-CM

## 2024-02-16 PROCEDURE — 99215 OFFICE O/P EST HI 40 MIN: CPT

## 2024-02-16 PROCEDURE — G2211 COMPLEX E/M VISIT ADD ON: CPT

## 2024-02-16 NOTE — CARDIOLOGY SUMMARY
[de-identified] : TTE 7/2021: mitral annular calcification; mildly calcified aortic valve; moderately dilated LA; mild concentric LVH; LVEF 65%; aortic arch plaque noted  [de-identified] : carotid 10/2021: mild atherosclerotic plaque in R carotid bulb, prox and mid ICA 16-49% OSCAR stenosis; mild-mod atherosclerotic plaque in L carotid bulb, prox ICA 16-49% LICA stenosis; mild hemodynamically insignificant atherosclerotic plaque of L ECA

## 2024-02-16 NOTE — PHYSICAL EXAM
[Well Developed] : well developed [Well Nourished] : well nourished [No Acute Distress] : no acute distress [Normal Conjunctiva] : normal conjunctiva [Normal S1, S2] : normal S1, S2 [Normal Gait] : normal gait [No Edema] : no edema [Moves all extremities] : moves all extremities [Normal] : alert and oriented, normal memory [Alert and Oriented] : alert and oriented [de-identified] : 2/6 systolic murmur heard throughout

## 2024-02-16 NOTE — DISCUSSION/SUMMARY
[FreeTextEntry1] : 83 F w/ hx of hyperlipidemia, CKD 4, hypertension, dilated aortic root, coronary artery calcification on CT chest, gout presents for a follow up visit.   #ASCVD - Coronary, carotid and aortic arch plaque- Does not tolerate statins. Severe muscle aches and brain fog; bempedoic acid was too expensive. - c/w Zetia and Repatha; recent LDL 55 (LDL goal <70) Recheck yearly.  -Blood pressures are now well controlled.    #Aortic root ectasia  - 3.7 cm - Dimensions normal on MRI 10/2021. Can monitor with echo every 2-3 years - Echo (5/2023):  nl LV and RV size and fx, mild tricuspid regurgitation, estimated pulmonary artery pressure is 43 mm Hg, no pericardial effusion, EF 75% Recheck in 5/2024  #Pulm HTN - echo as above, estimated pulmonary artery pressure is 43 mm Hg (5/2023) - repeat echo in May 2024  #CKD, gout:  - follows w/ Dr. Love - She never tried Farxiga and cost was also an issue, but holding off because of risk of UTI. - She will f/u with her other providers.  Follow up visit in 6 months in person with Dr Daugherty.

## 2024-02-16 NOTE — HISTORY OF PRESENT ILLNESS
[FreeTextEntry1] : 83 F w/ hx of hyperlipidemia, CKD 4, hypertension, dilated aortic root, coronary artery calcification on CT chest, gout presents for a follow up visit.   No chest pain, SOB, palpitations, nausea, vomiting, PND, or orthopnea reported. She had a loss of her good friend recently and is now grieving.  She otherwise feels well overall.  She is still dealing with some of the side effects of chemo that she had received (cysplatin). She has some GI issues, but not currently entertaining surgery for adhesions.  She also gets intermittent night sweats that are erratic and she can't understand what they are due to.   She has been paying out of pocket for Repatha because of a supply issue (not insurance). Patient states that she bikes 6 miles a day. She took her blood pressure medication about one hour prior to this appointment. Blood pressure in office noted to be elevated to 158/80 mm Hg.   Blood pressure at home: 130s-140s/ 70s mm Hg.   Interim hx:   -last saw nephrology 6/2023  -Gout managed by Dr. Love. ================================================================================ 8/23/23: TAG 60, , HDL 95, LDL 55 1/31/23:  TG 67 HDL 77 LDL 55  8/11/2022:  TG 82 HDL 82 LDL 55 A1c 5.8% 6/15/22:  TG 55 HDL 83 LDL 52 (on Zetia and Repatha) 8/23/23 A1C: 5.6   11/14/23- ABPM- daytime above goal with good dipping Echo (5/2023):  nl LV and RV size and fx, mild tricuspid regurgitation, estimated pulmonary artery pressure is 43 mm Hg, no pericardial effusion, EF 75%  Records from Dr. Cardozo's office:   -LE arterial 9/2020 - reduced blood flow; doppler is biphasic, suggestive of mild PAD; increased wall resistance   -LE venous 9/2020 - incompetent valves seen in PTV/PTV in both legs; varicose veins   -CT neck w/out contrast 2/2018: no cervical mass or lymphadenopathy    -Stress test 12/2020 - negative for ischemia - modified Christiano; manual stage 2 was max |  total exercise time: 08:24  |  max  bpm; 90% of predicted, 5.10 METs | target HR achieved |  no chest pain    -Carotid 10/2020: IMT b/l carotid systems; no significant stenosis.

## 2024-03-18 ENCOUNTER — MED ADMIN CHARGE (OUTPATIENT)
Age: 84
End: 2024-03-18

## 2024-03-18 ENCOUNTER — APPOINTMENT (OUTPATIENT)
Dept: INTERNAL MEDICINE | Facility: CLINIC | Age: 84
End: 2024-03-18
Payer: MEDICARE

## 2024-03-18 VITALS
OXYGEN SATURATION: 97 % | SYSTOLIC BLOOD PRESSURE: 137 MMHG | DIASTOLIC BLOOD PRESSURE: 70 MMHG | BODY MASS INDEX: 24.07 KG/M2 | HEART RATE: 78 BPM | WEIGHT: 141 LBS | TEMPERATURE: 97.6 F | HEIGHT: 64 IN | RESPIRATION RATE: 18 BRPM

## 2024-03-18 DIAGNOSIS — E21.3 HYPERPARATHYROIDISM, UNSPECIFIED: ICD-10-CM

## 2024-03-18 DIAGNOSIS — M81.0 AGE-RELATED OSTEOPOROSIS W/OUT CURRENT PATHOLOGICAL FRACTURE: ICD-10-CM

## 2024-03-18 DIAGNOSIS — N18.4 CHRONIC KIDNEY DISEASE, STAGE 4 (SEVERE): ICD-10-CM

## 2024-03-18 DIAGNOSIS — R53.83 OTHER FATIGUE: ICD-10-CM

## 2024-03-18 DIAGNOSIS — E78.00 PURE HYPERCHOLESTEROLEMIA, UNSPECIFIED: ICD-10-CM

## 2024-03-18 DIAGNOSIS — E03.9 HYPOTHYROIDISM, UNSPECIFIED: ICD-10-CM

## 2024-03-18 PROCEDURE — 96372 THER/PROPH/DIAG INJ SC/IM: CPT

## 2024-03-18 PROCEDURE — 99214 OFFICE O/P EST MOD 30 MIN: CPT | Mod: 25

## 2024-03-18 PROCEDURE — 36415 COLL VENOUS BLD VENIPUNCTURE: CPT

## 2024-03-18 RX ADMIN — CYANOCOBALAMIN 0 MCG/ML: 1000 INJECTION, SOLUTION INTRAMUSCULAR at 00:00

## 2024-03-18 NOTE — END OF VISIT
[FreeTextEntry3] : I, Dr. Guzman, personally performed the evaluation and management (E/M) services for this established patient who presents today with (a) new problem(s)/exacerbation of (an) existing condition(s).  That E/M includes conducting the examination, assessing all new/exacerbated conditions, and establishing a new plan of care.  Today, my PA, Sushma Cisneros, was here to observe my evaluation and management services for this new problem/exacerbated condition to be followed going forward.

## 2024-03-18 NOTE — PHYSICAL EXAM
[No Acute Distress] : no acute distress [Well-Appearing] : well-appearing [Normal Sclera/Conjunctiva] : normal sclera/conjunctiva [Normal Rate] : normal rate  [Regular Rhythm] : with a regular rhythm [No Edema] : there was no peripheral edema [Normal] : affect was normal and insight and judgment were intact [de-identified] : radial pulses 2 + b/l

## 2024-03-18 NOTE — HISTORY OF PRESENT ILLNESS
[FreeTextEntry1] : f/u fatigue s/p covid infection [de-identified] : Pt is a 82 y/o F with PMHx of mesothelioma of abd, HTN, HLD, hypothyroidism, pulm HTN, OP who presents to the office today for f/u fatigue s/p covid infection  Covid: - fatigue since dx of covid in Nov 2023 - she also lost a friend to leukemia in Dec 2023 - tired after usual activities - feels like she is dragging and pushing herself to get through ADLs - was very tired after a walk across [address], had to go to a friends and rest for a few hours - now no "brain fog."  Bereavement: - pt is still grieving the loss of her friend - she tries not to think about it which helps but still impacts her mood

## 2024-03-19 LAB
ALBUMIN SERPL ELPH-MCNC: 4.4 G/DL
ALP BLD-CCNC: 79 U/L
ALT SERPL-CCNC: 13 U/L
ANION GAP SERPL CALC-SCNC: 14 MMOL/L
AST SERPL-CCNC: 17 U/L
BASOPHILS # BLD AUTO: 0.03 K/UL
BASOPHILS NFR BLD AUTO: 0.6 %
BILIRUB SERPL-MCNC: 0.3 MG/DL
BUN SERPL-MCNC: 28 MG/DL
CALCIUM SERPL-MCNC: 11.1 MG/DL
CHLORIDE SERPL-SCNC: 107 MMOL/L
CHOLEST SERPL-MCNC: 155 MG/DL
CO2 SERPL-SCNC: 24 MMOL/L
CREAT SERPL-MCNC: 1.58 MG/DL
EGFR: 33 ML/MIN/1.73M2
EOSINOPHIL # BLD AUTO: 0.13 K/UL
EOSINOPHIL NFR BLD AUTO: 2.5 %
FERRITIN SERPL-MCNC: 19 NG/ML
FOLATE SERPL-MCNC: >20 NG/ML
GLUCOSE SERPL-MCNC: 77 MG/DL
HCT VFR BLD CALC: 36.1 %
HDLC SERPL-MCNC: 81 MG/DL
HGB BLD-MCNC: 11.6 G/DL
IMM GRANULOCYTES NFR BLD AUTO: 0.4 %
IRON SATN MFR SERPL: 23 %
IRON SERPL-MCNC: 89 UG/DL
LDLC SERPL CALC-MCNC: 58 MG/DL
LYMPHOCYTES # BLD AUTO: 1.36 K/UL
LYMPHOCYTES NFR BLD AUTO: 25.9 %
MAN DIFF?: NORMAL
MCHC RBC-ENTMCNC: 30.9 PG
MCHC RBC-ENTMCNC: 32.1 GM/DL
MCV RBC AUTO: 96.3 FL
MONOCYTES # BLD AUTO: 0.56 K/UL
MONOCYTES NFR BLD AUTO: 10.6 %
NEUTROPHILS # BLD AUTO: 3.16 K/UL
NEUTROPHILS NFR BLD AUTO: 60 %
NONHDLC SERPL-MCNC: 74 MG/DL
PLATELET # BLD AUTO: 251 K/UL
POTASSIUM SERPL-SCNC: 4.8 MMOL/L
PROT SERPL-MCNC: 6.7 G/DL
RBC # BLD: 3.75 M/UL
RBC # FLD: 15.6 %
SODIUM SERPL-SCNC: 144 MMOL/L
TIBC SERPL-MCNC: 377 UG/DL
TRIGL SERPL-MCNC: 89 MG/DL
TSH SERPL-ACNC: 0.31 UIU/ML
UIBC SERPL-MCNC: 289 UG/DL
VIT B12 SERPL-MCNC: 245 PG/ML
WBC # FLD AUTO: 5.26 K/UL

## 2024-03-22 RX ORDER — CYANOCOBALAMIN 1000 UG/ML
1000 INJECTION INTRAMUSCULAR; SUBCUTANEOUS
Qty: 1 | Refills: 0 | Status: COMPLETED | OUTPATIENT
Start: 2024-03-18

## 2024-04-11 ENCOUNTER — OFFICE (OUTPATIENT)
Dept: URBAN - METROPOLITAN AREA CLINIC 28 | Facility: CLINIC | Age: 84
Setting detail: OPHTHALMOLOGY
End: 2024-04-11
Payer: MEDICARE

## 2024-04-11 DIAGNOSIS — H47.233: ICD-10-CM

## 2024-04-11 DIAGNOSIS — H16.142: ICD-10-CM

## 2024-04-11 DIAGNOSIS — H40.063: ICD-10-CM

## 2024-04-11 DIAGNOSIS — H25.13: ICD-10-CM

## 2024-04-11 DIAGNOSIS — H40.013: ICD-10-CM

## 2024-04-11 DIAGNOSIS — H01.001: ICD-10-CM

## 2024-04-11 DIAGNOSIS — H01.004: ICD-10-CM

## 2024-04-11 DIAGNOSIS — H02.403: ICD-10-CM

## 2024-04-11 DIAGNOSIS — H16.223: ICD-10-CM

## 2024-04-11 PROCEDURE — 92012 INTRM OPH EXAM EST PATIENT: CPT | Performed by: OPHTHALMOLOGY

## 2024-04-11 PROCEDURE — 92133 CPTRZD OPH DX IMG PST SGM ON: CPT | Performed by: OPHTHALMOLOGY

## 2024-04-11 PROCEDURE — 92020 GONIOSCOPY: CPT | Performed by: OPHTHALMOLOGY

## 2024-04-11 ASSESSMENT — LID EXAM ASSESSMENTS
OS_BLEPHARITIS: LUL 1+
OD_BLEPHARITIS: RUL 1+

## 2024-04-11 ASSESSMENT — LID POSITION - PTOSIS
OD_PTOSIS: RUL 2+ 3+
OS_PTOSIS: LUL 1+ 2+

## 2024-04-14 ENCOUNTER — EMERGENCY (EMERGENCY)
Facility: HOSPITAL | Age: 84
LOS: 1 days | Discharge: HOME | End: 2024-04-14
Attending: STUDENT IN AN ORGANIZED HEALTH CARE EDUCATION/TRAINING PROGRAM | Admitting: STUDENT IN AN ORGANIZED HEALTH CARE EDUCATION/TRAINING PROGRAM
Payer: MEDICARE

## 2024-04-14 VITALS
OXYGEN SATURATION: 97 % | RESPIRATION RATE: 18 BRPM | HEART RATE: 70 BPM | SYSTOLIC BLOOD PRESSURE: 176 MMHG | TEMPERATURE: 98 F | DIASTOLIC BLOOD PRESSURE: 85 MMHG

## 2024-04-14 VITALS
WEIGHT: 143.96 LBS | DIASTOLIC BLOOD PRESSURE: 86 MMHG | TEMPERATURE: 98 F | SYSTOLIC BLOOD PRESSURE: 180 MMHG | OXYGEN SATURATION: 98 % | RESPIRATION RATE: 18 BRPM | HEART RATE: 89 BPM | HEIGHT: 64 IN

## 2024-04-14 DIAGNOSIS — R79.89 OTHER SPECIFIED ABNORMAL FINDINGS OF BLOOD CHEMISTRY: ICD-10-CM

## 2024-04-14 DIAGNOSIS — F41.0 PANIC DISORDER [EPISODIC PAROXYSMAL ANXIETY]: ICD-10-CM

## 2024-04-14 DIAGNOSIS — Z90.710 ACQUIRED ABSENCE OF BOTH CERVIX AND UTERUS: Chronic | ICD-10-CM

## 2024-04-14 DIAGNOSIS — Z91.018 ALLERGY TO OTHER FOODS: ICD-10-CM

## 2024-04-14 DIAGNOSIS — M19.90 UNSPECIFIED OSTEOARTHRITIS, UNSPECIFIED SITE: ICD-10-CM

## 2024-04-14 DIAGNOSIS — Z88.2 ALLERGY STATUS TO SULFONAMIDES: ICD-10-CM

## 2024-04-14 DIAGNOSIS — E03.9 HYPOTHYROIDISM, UNSPECIFIED: ICD-10-CM

## 2024-04-14 DIAGNOSIS — Z88.8 ALLERGY STATUS TO OTHER DRUGS, MEDICAMENTS AND BIOLOGICAL SUBSTANCES: ICD-10-CM

## 2024-04-14 DIAGNOSIS — Z88.1 ALLERGY STATUS TO OTHER ANTIBIOTIC AGENTS STATUS: ICD-10-CM

## 2024-04-14 DIAGNOSIS — I10 ESSENTIAL (PRIMARY) HYPERTENSION: ICD-10-CM

## 2024-04-14 DIAGNOSIS — Z88.0 ALLERGY STATUS TO PENICILLIN: ICD-10-CM

## 2024-04-14 LAB
ANION GAP SERPL CALC-SCNC: 13 MMOL/L — SIGNIFICANT CHANGE UP (ref 5–17)
BASOPHILS # BLD AUTO: 0.01 K/UL — SIGNIFICANT CHANGE UP (ref 0–0.2)
BASOPHILS NFR BLD AUTO: 0.1 % — SIGNIFICANT CHANGE UP (ref 0–2)
BUN SERPL-MCNC: 37 MG/DL — HIGH (ref 7–23)
CALCIUM SERPL-MCNC: 11.1 MG/DL — HIGH (ref 8.4–10.5)
CHLORIDE SERPL-SCNC: 101 MMOL/L — SIGNIFICANT CHANGE UP (ref 96–108)
CO2 SERPL-SCNC: 22 MMOL/L — SIGNIFICANT CHANGE UP (ref 22–31)
CREAT SERPL-MCNC: 1.38 MG/DL — HIGH (ref 0.5–1.3)
EGFR: 38 ML/MIN/1.73M2 — LOW
EOSINOPHIL # BLD AUTO: 0 K/UL — SIGNIFICANT CHANGE UP (ref 0–0.5)
EOSINOPHIL NFR BLD AUTO: 0 % — SIGNIFICANT CHANGE UP (ref 0–6)
GLUCOSE SERPL-MCNC: 119 MG/DL — HIGH (ref 70–99)
HCT VFR BLD CALC: 38.1 % — SIGNIFICANT CHANGE UP (ref 34.5–45)
HGB BLD-MCNC: 12 G/DL — SIGNIFICANT CHANGE UP (ref 11.5–15.5)
IMM GRANULOCYTES NFR BLD AUTO: 0.4 % — SIGNIFICANT CHANGE UP (ref 0–0.9)
LYMPHOCYTES # BLD AUTO: 1.44 K/UL — SIGNIFICANT CHANGE UP (ref 1–3.3)
LYMPHOCYTES # BLD AUTO: 15.6 % — SIGNIFICANT CHANGE UP (ref 13–44)
MCHC RBC-ENTMCNC: 30.1 PG — SIGNIFICANT CHANGE UP (ref 27–34)
MCHC RBC-ENTMCNC: 31.5 GM/DL — LOW (ref 32–36)
MCV RBC AUTO: 95.5 FL — SIGNIFICANT CHANGE UP (ref 80–100)
MONOCYTES # BLD AUTO: 0.83 K/UL — SIGNIFICANT CHANGE UP (ref 0–0.9)
MONOCYTES NFR BLD AUTO: 9 % — SIGNIFICANT CHANGE UP (ref 2–14)
NEUTROPHILS # BLD AUTO: 6.89 K/UL — SIGNIFICANT CHANGE UP (ref 1.8–7.4)
NEUTROPHILS NFR BLD AUTO: 74.9 % — SIGNIFICANT CHANGE UP (ref 43–77)
NRBC # BLD: 0 /100 WBCS — SIGNIFICANT CHANGE UP (ref 0–0)
PLATELET # BLD AUTO: 280 K/UL — SIGNIFICANT CHANGE UP (ref 150–400)
POTASSIUM SERPL-MCNC: 4.8 MMOL/L — SIGNIFICANT CHANGE UP (ref 3.5–5.3)
POTASSIUM SERPL-SCNC: 4.8 MMOL/L — SIGNIFICANT CHANGE UP (ref 3.5–5.3)
RBC # BLD: 3.99 M/UL — SIGNIFICANT CHANGE UP (ref 3.8–5.2)
RBC # FLD: 14.6 % — HIGH (ref 10.3–14.5)
SODIUM SERPL-SCNC: 136 MMOL/L — SIGNIFICANT CHANGE UP (ref 135–145)
WBC # BLD: 9.21 K/UL — SIGNIFICANT CHANGE UP (ref 3.8–10.5)
WBC # FLD AUTO: 9.21 K/UL — SIGNIFICANT CHANGE UP (ref 3.8–10.5)

## 2024-04-14 PROCEDURE — 85025 COMPLETE CBC W/AUTO DIFF WBC: CPT

## 2024-04-14 PROCEDURE — 80048 BASIC METABOLIC PNL TOTAL CA: CPT

## 2024-04-14 PROCEDURE — 36415 COLL VENOUS BLD VENIPUNCTURE: CPT

## 2024-04-14 PROCEDURE — 99284 EMERGENCY DEPT VISIT MOD MDM: CPT

## 2024-04-14 RX ORDER — SODIUM CHLORIDE 9 MG/ML
1000 INJECTION, SOLUTION INTRAVENOUS ONCE
Refills: 0 | Status: COMPLETED | OUTPATIENT
Start: 2024-04-14 | End: 2024-04-14

## 2024-04-14 RX ORDER — ALPRAZOLAM 0.25 MG
1 TABLET ORAL ONCE
Refills: 0 | Status: DISCONTINUED | OUTPATIENT
Start: 2024-04-14 | End: 2024-04-14

## 2024-04-14 RX ADMIN — Medication 1 MILLIGRAM(S): at 21:13

## 2024-04-14 RX ADMIN — SODIUM CHLORIDE 1000 MILLILITER(S): 9 INJECTION, SOLUTION INTRAVENOUS at 20:50

## 2024-04-14 NOTE — ED PROVIDER NOTE - CLINICAL SUMMARY MEDICAL DECISION MAKING FREE TEXT BOX
VS w/ hypertensive likely 2/2 stress and anxiety as well as underlying HTN   exam w/o focal neurologic deficits  presentation consistent w/ likely panic attack  not consistent w/ ACS- pt says no chest pain/SOB and EKG normal sinus non-ischemic here, no hx of CAD either  due to age, will check basic labs to r/o underlying medical process  basic grossly wnl, Cr mildly elevated 1.38, discussed w/ pt who says her baseline Cr is around 1.5-1.6  symptomatic treatment w/ xanax, pt symptoms improved, stable for dc  No further indication for emergent or inpatient workup, pt stable and ready for discharge. Results, diagnosis and post-discharge plan including appropriate outpatient follow up were discussed and all questions answered

## 2024-04-14 NOTE — ED PROVIDER NOTE - PATIENT PORTAL LINK FT
You can access the FollowMyHealth Patient Portal offered by [facility] by registering at the following website: [email or web address]. By joining [facility]’s FollowMyHealth portal, you will also be able to view your health information using other applications (apps) compatible with our system.

## 2024-04-14 NOTE — ED ADULT TRIAGE NOTE - CHIEF COMPLAINT QUOTE
Returned your call. She can be reached at [phone]   Pt states, "I have a lot of stress going on, we were talking about the person who  and all of a sudden my mouth got really dry and I lost my train of thought and I just can't bear it at all."

## 2024-04-14 NOTE — ED ADULT NURSE NOTE - NSFALLRISKINTERV_ED_ALL_ED

## 2024-04-14 NOTE — ED PROVIDER NOTE - OBJECTIVE STATEMENT
83yF w/ HN hypothyroid arthritis baseline ambulatory and functional comes in for eval. Pt says someone recently  and she was in the  person's house, speaking about the  with her family and all of a sudden felt very stressed and overwhelmed, her mouth and throat went dry, her whole body started tingling, she lost train of thought. No chest pain or SOB.   Pt reports hx of panic attacks in the past and used to take valium for it. Instead switched to natural herbal remedies, tried her herbal remedy tonight w/o relief so came here. Pt says no unilateral weakness/numbness. Symptoms similar to prior panic attacks.

## 2024-04-14 NOTE — ED PROVIDER NOTE - NSFOLLOWUPINSTRUCTIONS_ED_ALL_ED_FT
Panic Attack    WHAT YOU NEED TO KNOW:    What is a panic attack? A panic attack is a strong feeling of fear or discomfort. The attack starts suddenly, is worst 10 minutes after it starts, and stops within 20 minutes. An attack may be triggered by something you do, such as public speaking. Exposure to something you are afraid of can also trigger an attack. A panic attack can also happen for no clear reason. Panic attacks that happen often may be a sign of a panic disorder that needs long-term treatment.    What are the signs and symptoms of a panic attack?    Chest pain or discomfort, or fast or irregular heartbeats    Sweating, trembling, lightheartedness, or fainting    Hyperventilation (breathing so quickly you become dizzy, lightheaded, or faint)    Shortness of breath, trouble breathing, or a feeling that you are choking or smothering    Pale or cold skin, chills, or hot flashes    Nausea, vomiting, or abdominal pain    A feeling that you are separate from your body  Heart Attack vs Panic Attack  How is a panic attack diagnosed and treated? Your healthcare provider will ask what triggered the attack. Tell your provider if fear of another panic attack limits your daily activities. Also tell your provider about any medications you currently take. Tests may be done to check for medical conditions that may be causing your symptoms. Treatment may include any of the following:    Medicines may be given to make you feel more relaxed or to reduce anxiety that causes a panic attack. Some medicines are taken only when you are having a panic attack. Other medicines can be taken to prevent panic attacks.    A behavior therapist can help you learn to control how your body responds to stressful situations. The therapist may also teach you ways to relax your muscles and slow your breathing during a panic attack.    Exposure therapy is used to help you change your reaction to triggers. You are exposed to your panic attack triggers in small amounts. The amount of exposure is slowly increased until it no longer triggers a panic attack.  What can I do to manage or prevent a panic attack?    Manage stress. Stress can trigger a panic attack. Ways to lower your stress level include yoga, meditation, and talking to someone about the stress in your life.    Exercise as directed. Exercise can reduce stress and help you sleep better. Try to get at least 30 minutes of physical activity on most days of the week. Your healthcare provider can help you create an exercise plan.    Set a sleep schedule. Too little sleep can increase anxiety. Go to bed at the same time each night and wake up at the same time each morning. Keep your room quiet and free from distractions, such as a television or computer.    Eat a variety of healthy foods. Healthy foods include fruits, vegetables, low-fat dairy products, lean meats, fish, and beans. Limit sugar. Sugar can increase your symptoms.    Do not have foods or drinks that contain caffeine. These include coffee, tea, soda, energy drinks, and chocolate. Caffeine can make anxiety worse or trigger a panic attack.    Limit alcohol. You may think alcohol makes you calmer, but it is not a safe or effective way to control anxiety. Alcohol can increase anxiety if you drink large amounts or drink often. Ask your healthcare provider how much alcohol is okay for you to drink. A drink of alcohol is 12 ounces of beer, 5 ounces of wine, or 1½ ounces of liquor.    Do not smoke. Nicotine and other chemicals in cigarettes and cigars can increase anxiety. Ask your healthcare provider for information if you currently smoke and need help to quit. E-cigarettes or smokeless tobacco still contain nicotine. Talk to your healthcare provider before you use these products.  Wellness Tips  Call your local emergency number (911 in the [place]) if:    You have any of the following signs of a heart attack:  Squeezing, pressure, or pain in your chest    You may also have any of the following:  Discomfort or pain in your back, neck, jaw, stomach, or arm    Shortness of breath    Nausea or vomiting    Lightheadedness or a sudden cold sweat    When should I call my doctor or therapist?    You have new or worsening panic attacks after treatment.    You have questions or concerns about your condition or care.  CARE AGREEMENT:    You have the right to help plan your care. Learn about your health condition and how it may be treated. Discuss treatment options with your healthcare providers to decide what care you want to receive. You always have the right to refuse treatment.    © [employer] 1973, 2024    	  back to top            © [employer] 1973, 2024

## 2024-04-14 NOTE — ED ADULT NURSE NOTE - CHIEF COMPLAINT QUOTE
Pt states, "I have a lot of stress going on, we were talking about the person who  and all of a sudden my mouth got really dry and I lost my train of thought and I just can't bear it at all."

## 2024-04-14 NOTE — ED ADULT NURSE NOTE - OBJECTIVE STATEMENT
Pt is an 83 YOF who presents with c/o anxiety. Pt reports she was talking about someone who passed away and started to feel a tingling sensation in her legs, dry mouth and lost her train of thought. Pt reports she has a hx of panic attacks in the past and this feels similar. On arrival to ED pt is A&Ox3 with breathing even and unlabored.

## 2024-04-18 ENCOUNTER — APPOINTMENT (OUTPATIENT)
Dept: INTERNAL MEDICINE | Facility: CLINIC | Age: 84
End: 2024-04-18
Payer: MEDICARE

## 2024-04-18 VITALS
HEART RATE: 99 BPM | OXYGEN SATURATION: 98 % | HEIGHT: 64 IN | DIASTOLIC BLOOD PRESSURE: 75 MMHG | SYSTOLIC BLOOD PRESSURE: 145 MMHG | TEMPERATURE: 97.5 F

## 2024-04-18 DIAGNOSIS — A09 INFECTIOUS GASTROENTERITIS AND COLITIS, UNSPECIFIED: ICD-10-CM

## 2024-04-18 DIAGNOSIS — F41.9 ANXIETY DISORDER, UNSPECIFIED: ICD-10-CM

## 2024-04-18 DIAGNOSIS — Z63.4 DISAPPEARANCE AND DEATH OF FAMILY MEMBER: ICD-10-CM

## 2024-04-18 DIAGNOSIS — M10.9 GOUT, UNSPECIFIED: ICD-10-CM

## 2024-04-18 PROCEDURE — 99214 OFFICE O/P EST MOD 30 MIN: CPT | Mod: 25

## 2024-04-18 PROCEDURE — 96372 THER/PROPH/DIAG INJ SC/IM: CPT

## 2024-04-18 PROCEDURE — G2211 COMPLEX E/M VISIT ADD ON: CPT

## 2024-04-18 RX ORDER — CYANOCOBALAMIN 1000 UG/ML
1000 INJECTION INTRAMUSCULAR; SUBCUTANEOUS
Qty: 0 | Refills: 0 | Status: COMPLETED | OUTPATIENT
Start: 2024-04-18

## 2024-04-18 RX ORDER — FOLIC ACID 1 MG/1
1 TABLET ORAL
Qty: 90 | Refills: 0 | Status: ACTIVE | COMMUNITY
Start: 2021-08-11 | End: 1900-01-01

## 2024-04-18 RX ADMIN — CYANOCOBALAMIN 1 MCG/ML: 1000 INJECTION INTRAMUSCULAR; SUBCUTANEOUS at 00:00

## 2024-04-18 SDOH — SOCIAL STABILITY - SOCIAL INSECURITY: DISSAPEARANCE AND DEATH OF FAMILY MEMBER: Z63.4

## 2024-04-18 NOTE — PHYSICAL EXAM
[No Acute Distress] : no acute distress [Normal Sclera/Conjunctiva] : normal sclera/conjunctiva [No Respiratory Distress] : no respiratory distress  [Alert and Oriented x3] : oriented to person, place, and time [de-identified] : tearful during exam, depressed about friend's passing.

## 2024-04-18 NOTE — HISTORY OF PRESENT ILLNESS
[FreeTextEntry1] : ED follow up [de-identified] : [facility] ED visit 4/14/24 - went to ED for evaluion of difficulty speaking/change in memory - was sitting with friend at mutual friend's apartment who recently passed - was overwhelmed w/ emotions - in ED dx as panic attack, sx improved w/ IV hydration and xanax - pt would like new local therapist, current therapist is too far away - has been using OTC herbal antianxiety medication which is working well for her; contains lemon balm and lavender - will be traveling to [place] for [place]; and is very anxious about being emotionally overwhelmed

## 2024-04-19 ENCOUNTER — EMERGENCY (EMERGENCY)
Facility: HOSPITAL | Age: 84
LOS: 1 days | Discharge: HOME | End: 2024-04-19
Attending: STUDENT IN AN ORGANIZED HEALTH CARE EDUCATION/TRAINING PROGRAM | Admitting: STUDENT IN AN ORGANIZED HEALTH CARE EDUCATION/TRAINING PROGRAM
Payer: MEDICARE

## 2024-04-19 VITALS
OXYGEN SATURATION: 97 % | SYSTOLIC BLOOD PRESSURE: 177 MMHG | HEIGHT: 64 IN | RESPIRATION RATE: 17 BRPM | DIASTOLIC BLOOD PRESSURE: 81 MMHG | TEMPERATURE: 98 F | WEIGHT: 145.95 LBS | HEART RATE: 69 BPM

## 2024-04-19 DIAGNOSIS — Z90.710 ACQUIRED ABSENCE OF BOTH CERVIX AND UTERUS: Chronic | ICD-10-CM

## 2024-04-19 PROCEDURE — 99283 EMERGENCY DEPT VISIT LOW MDM: CPT

## 2024-04-19 PROCEDURE — 99284 EMERGENCY DEPT VISIT MOD MDM: CPT

## 2024-04-19 RX ORDER — ALPRAZOLAM 0.25 MG
1 TABLET ORAL ONCE
Refills: 0 | Status: DISCONTINUED | OUTPATIENT
Start: 2024-04-19 | End: 2024-04-19

## 2024-04-19 RX ADMIN — Medication 1 MILLIGRAM(S): at 21:21

## 2024-04-19 NOTE — ED PROVIDER NOTE - NSFOLLOWUPINSTRUCTIONS_ED_ALL_ED_FT
Managing Loss, Adult  People experience loss in many different ways throughout their lives. Events such as moving, changing jobs, and losing friends can create a sense of loss. The loss may be as serious as a major health change, divorce, death of a pet, or death of a loved one. All of these types of loss are likely to create a physical and emotional reaction known as grief. Grief is the result of a major change or an absence of something or someone that you count on. Grief is a normal reaction to loss.    A variety of factors can affect your grieving experience, including:  The nature of your loss.  Your relationship to what or whom you lost.  Your understanding of grief and how to manage it.  Your support system.  Be aware that when grief becomes extreme, it can lead to more severe issues like isolation, depression, anxiety, or suicidal thoughts. Talk with your health care provider if you have any of these issues.    How to manage lifestyle changes  Three people at a table; two are playing chess, while the third watches.  Keep to your normal routine as much as possible.  If you have trouble focusing or doing normal activities, it is acceptable to take some time away from your normal routine.  Spend time with friends and loved ones.  Eat a healthy diet, get plenty of sleep, and rest when you feel tired.  How to recognize changes  The way that you deal with your grief will affect your ability to function as you normally do. When grieving, you may experience these changes:  Numbness, shock, sadness, anxiety, anger, denial, and guilt.  Thoughts about death.  Unexpected crying.  A physical sensation of emptiness in your stomach.  Problems sleeping and eating.  Tiredness (fatigue).  Loss of interest in normal activities.  Dreaming about or imagining seeing the person who .  A need to remember what or whom you lost.  Difficulty thinking about anything other than your loss for a period of time.  Relief. If you have been expecting the loss for a while, you may feel a sense of relief when it happens.  Follow these instructions at home:  Activity    An adult sitting on a park bench and listening to music through headphones.  Express your feelings in healthy ways, such as:  Talking with others about your loss. It may be helpful to find others who have had a similar loss, such as a support group.  Writing down your feelings in a journal.  Doing physical activities to release stress and emotional energy.  Doing creative activities like painting, sculpting, or playing or listening to music.  Practicing resilience. This is the ability to recover and adjust after facing challenges. Reading some resources that encourage resilience may help you to learn ways to practice those behaviors.  General instructions    Be patient with yourself and others. Allow the grieving process to happen, and remember that grieving takes time.  It is likely that you may never feel completely done with some grief. You may find a way to move on while still cherishing memories and feelings about your loss.  Accepting your loss is a process. It can take months or longer to adjust.  Keep all follow-up visits. This is important.  Where to find support  To get support for managing loss:  Ask your health care provider for help and recommendations, such as grief counseling or therapy.  Think about joining a support group for people who are managing a loss.  Where to find more information  You can find more information about managing loss from:  American Society of Clinical Oncology: www.cancer.net  American Psychological Association: www.apa.org  Contact a health care provider if:  Your grief is extreme and keeps getting worse.  You have ongoing grief that does not improve.  Your body shows symptoms of grief, such as illness.  You feel depressed, anxious, or hopeless.  Get help right away if:  You have thoughts about hurting yourself or others.  Get help right away if you feel like you may hurt yourself or others, or have thoughts about taking your own life. Go to your nearest emergency room or:  Call 911.  Call the National Suicide Prevention Lifeline at 1-988.167.6807 or 289. This is open 24 hours a day.  Text the Crisis Text Line at 210234.  Summary  Grief is the result of a major change or an absence of someone or something that you count on. Grief is a normal reaction to loss.  The depth of grief and the period of recovery depend on the type of loss and your ability to adjust to the change and process your feelings.  Processing grief requires patience and a willingness to accept your feelings and talk about your loss with people who are supportive.  It is important to find resources that work for you and to realize that people experience grief differently. There is not one grieving process that works for everyone in the same way.  Be aware that when grief becomes extreme, it can lead to more severe issues like isolation, depression, anxiety, or suicidal thoughts. Talk with your health care provider if you have any of these issues.  This information is not intended to replace advice given to you by your health care provider. Make sure you discuss any questions you have with your health care provider.

## 2024-04-19 NOTE — ED PROVIDER NOTE - PHYSICAL EXAMINATION
General: resting in ED  HEENT: atraumatic, no eye erythema or discharge  Pulm: no cyanosis, no added work of breathing  Cardiac: no pallor, intact peripheral pulse  GI: no abdominal distension  Neuro: alert, conversant  Psych: labile affect, cooperative, linear  Msk: no gross deformity or instability  Skin: no erythema or rash

## 2024-04-19 NOTE — ED ADULT NURSE NOTE - OBJECTIVE STATEMENT
83 yr old female c/o anxiety. Pt states she has been dealing with severe anxiety after a loss of a friend. Pt denies chest pain, SOB, fevers, chills, N/V. Respirations spontaneous and unlabored. A&Ox4. NAD. DURAN.

## 2024-04-19 NOTE — ED ADULT NURSE NOTE - NSFALLUNIVINTERV_ED_ALL_ED
Bed/Stretcher in lowest position, wheels locked, appropriate side rails in place/Call bell, personal items and telephone in reach/Instruct patient to call for assistance before getting out of bed/chair/stretcher/Non-slip footwear applied when patient is off stretcher/[place] to call system/Physically safe environment - no spills, clutter or unnecessary equipment/Purposeful proactive rounding/Room/bathroom lighting operational, light cord in reach

## 2024-04-19 NOTE — ED PROVIDER NOTE - CLINICAL SUMMARY MEDICAL DECISION MAKING FREE TEXT BOX
Concern for grief reaction superimposed on existing anxiety disorder.  Risks of benzodiazapine prescription greater than benefits given patient's age, however will give one dose in ED.  No history or vital sign abnormality suggestive of current or impending cardiopulmonary compromise.  No mood syndrome requiring inpatient hospitalization, concern below threshold for psychiatry consult in ED.  Will encourage patient to follow up with outpatient psychiatry.  Plan to discharge home with return precautions and outpatient followup.  Patient's friend will escort patient home.

## 2024-04-19 NOTE — ED ADULT TRIAGE NOTE - WEIGHT IN KG
66.2
Review of Systems    Constitutional: (-) fever or chills  respiratory: (-) cough (-) shortness of breath  Cardiovascular: (-) syncope, palpitations or chest pain  Integumentary: (-) rash or painful lymph nodes  Neurological: (-) altered mental status, headache or head injury

## 2024-04-19 NOTE — ED PROVIDER NOTE - PATIENT PORTAL LINK FT
You can access the FollowMyHealth Patient Portal offered by [facility] by registering at the following website: [email or web address]. By joining [employer]’s FollowMyHealth portal, you will also be able to view your health information using other applications (apps) compatible with our system.

## 2024-04-19 NOTE — ED PROVIDER NOTE - OBJECTIVE STATEMENT
As per patient and friend, 83F with hx anxiety, now with severe grief / sadness / panic attacks surrounding death of close friend, seen in ED previously and at PCP for similar symptoms, s/p EKG and labs ultimately discharged from ED, now with severe anxiety and grief.  No passive or active SI, HI, or AVH.  Requesting benzodiazapine prescription possibly promised by PCP.

## 2024-04-19 NOTE — ED ADULT TRIAGE NOTE - CHIEF COMPLAINT QUOTE
Pt presents with c/o "severe anxiety", seen on Sunday for c/o same, following death of friend.. Received Valium, went to PCP for rx yesterday , did not get called in to pharmacy per pt. Also went to [facility] today for Valium, did not get med. States "I just need something to get me through". Denies any c/pp or SOB.

## 2024-04-19 NOTE — ED PROVIDER NOTE - NSFOLLOWUPCLINICS_GEN_ALL_ED_FT
[facility]  Family Medicine  [address], [address]  [place], [place] [address]  Phone: [phone]  Fax:

## 2024-04-22 DIAGNOSIS — Z88.1 ALLERGY STATUS TO OTHER ANTIBIOTIC AGENTS STATUS: ICD-10-CM

## 2024-04-22 DIAGNOSIS — Z88.8 ALLERGY STATUS TO OTHER DRUGS, MEDICAMENTS AND BIOLOGICAL SUBSTANCES: ICD-10-CM

## 2024-04-22 DIAGNOSIS — F41.9 ANXIETY DISORDER, UNSPECIFIED: ICD-10-CM

## 2024-04-22 DIAGNOSIS — Z88.0 ALLERGY STATUS TO PENICILLIN: ICD-10-CM

## 2024-04-22 DIAGNOSIS — Z91.018 ALLERGY TO OTHER FOODS: ICD-10-CM

## 2024-04-22 DIAGNOSIS — F43.21 ADJUSTMENT DISORDER WITH DEPRESSED MOOD: ICD-10-CM

## 2024-04-22 DIAGNOSIS — Z88.2 ALLERGY STATUS TO SULFONAMIDES: ICD-10-CM

## 2024-04-22 RX ORDER — DIAZEPAM 5 MG/1
5 TABLET ORAL
Qty: 30 | Refills: 0 | Status: ACTIVE | COMMUNITY
Start: 2023-05-17 | End: 1900-01-01

## 2024-05-23 PROBLEM — I77.810 DILATION OF THORACIC AORTA: Status: ACTIVE | Noted: 2021-10-18

## 2024-05-24 ENCOUNTER — APPOINTMENT (OUTPATIENT)
Dept: HEART AND VASCULAR | Facility: CLINIC | Age: 84
End: 2024-05-24
Payer: MEDICARE

## 2024-05-24 VITALS
WEIGHT: 147 LBS | TEMPERATURE: 97.2 F | HEART RATE: 84 BPM | OXYGEN SATURATION: 97 % | DIASTOLIC BLOOD PRESSURE: 68 MMHG | HEIGHT: 64 IN | SYSTOLIC BLOOD PRESSURE: 124 MMHG | BODY MASS INDEX: 25.1 KG/M2

## 2024-05-24 DIAGNOSIS — I25.10 ATHEROSCLEROTIC HEART DISEASE OF NATIVE CORONARY ARTERY W/OUT ANGINA PECTORIS: ICD-10-CM

## 2024-05-24 DIAGNOSIS — I77.810 THORACIC AORTIC ECTASIA: ICD-10-CM

## 2024-05-24 PROCEDURE — 99214 OFFICE O/P EST MOD 30 MIN: CPT

## 2024-05-24 PROCEDURE — G2211 COMPLEX E/M VISIT ADD ON: CPT

## 2024-05-24 PROCEDURE — 93000 ELECTROCARDIOGRAM COMPLETE: CPT

## 2024-05-24 NOTE — PHYSICAL EXAM
[Well Developed] : well developed [Well Nourished] : well nourished [No Acute Distress] : no acute distress [Normal Conjunctiva] : normal conjunctiva [Normal S1, S2] : normal S1, S2 [Normal Gait] : normal gait [No Edema] : no edema [Moves all extremities] : moves all extremities [Normal] : alert and oriented, normal memory [Alert and Oriented] : alert and oriented [Normal Venous Pressure] : normal venous pressure [No Carotid Bruit] : no carotid bruit [de-identified] : 2/6 systolic murmur heard throughout

## 2024-05-24 NOTE — HISTORY OF PRESENT ILLNESS
[FreeTextEntry1] : 83 F w/ hx of hyperlipidemia, CKD 4, hypertension, dilated aortic root, coronary artery calcification on CT chest, gout presents for a follow up visit and repeat echo.   No chest pain, SOB, palpitations, nausea, vomiting, PND, or orthopnea reported. She had a loss of her good friend recently and is still grieving. Got a cold [place] from [place] and is still coughing a bit. Had a gout flair and is now tapering down her prednisone dosage.  She otherwise feels well overall.   Blood pressure at home: 130s-140s/ 70s mm Hg.   Interim hx:   -last saw nephrology 6/2023  -Gout managed by Dr. Love. ================================================================================ 8/23/23: TAG 60, , HDL 95, LDL 55 1/31/23:  TG 67 HDL 77 LDL 55  8/11/2022:  TG 82 HDL 82 LDL 55 A1c 5.8% 6/15/22:  TG 55 HDL 83 LDL 52 (on Zetia and Repatha) 8/23/23 A1C: 5.6   11/14/23- ABPM- daytime above goal with good dipping Echo (5/2023):  nl LV and RV size and fx, mild tricuspid regurgitation, estimated pulmonary artery pressure is 43 mm Hg, no pericardial effusion, EF 75%  Records from Dr. Cardozo's office:   -LE arterial 9/2020 - reduced blood flow; doppler is biphasic, suggestive of mild PAD; increased wall resistance   -LE venous 9/2020 - incompetent valves seen in PTV/PTV in both legs; varicose veins   -CT neck w/out contrast 2/2018: no cervical mass or lymphadenopathy    -Stress test 12/2020 - negative for ischemia - modified Christiano; manual stage 2 was max |  total exercise time: 08:24  |  max  bpm; 90% of predicted, 5.10 METs | target HR achieved |  no chest pain    -Carotid 10/2020: IMT b/l carotid systems; no significant stenosis.

## 2024-05-24 NOTE — CARDIOLOGY SUMMARY
[de-identified] : TTE 7/2021: mitral annular calcification; mildly calcified aortic valve; moderately dilated LA; mild concentric LVH; LVEF 65%; aortic arch plaque noted  [de-identified] : carotid 10/2021: mild atherosclerotic plaque in R carotid bulb, prox and mid ICA 16-49% OSCAR stenosis; mild-mod atherosclerotic plaque in L carotid bulb, prox ICA 16-49% LICA stenosis; mild hemodynamically insignificant atherosclerotic plaque of L ECA

## 2024-05-24 NOTE — DISCUSSION/SUMMARY
[FreeTextEntry1] : 83 F w/ hx of hyperlipidemia, CKD 4, hypertension, dilated aortic root, coronary artery calcification on CT chest, gout presents for a follow up visit and repeat echo.   #ASCVD - Coronary, carotid and aortic arch plaque- Does not tolerate statins. Severe muscle aches and brain fog; bempedoic acid was too expensive (also has gout so not ideal as it can increase uric acid levels)  - c/w Zetia and Repatha; recent LDL 58 in March (LDL goal <70) -Blood pressures are now well controlled.    #Aortic root ectasia, #Pulm HTN - 3.7 cm - Dimensions normal on MRI 10/2021 - Echo (5/2023):  nl LV and RV size and fx, mild tricuspid regurgitation, estimated pulmonary artery pressure is 43 mm Hg, no pericardial effusion, EF 75% - Repeat echo today; Dr. Daugherty will read   #CKD, gout: taking prednisone now for gout flair  - follows w/ Dr. Love - She never tried Farxiga and cost was also an issue, but holding off because of risk of UTI. - She will f/u with her other providers.  Follow up visit in 6 months in person with Dr Daugherty.  [EKG obtained to assist in diagnosis and management of assessed problem(s)] : EKG obtained to assist in diagnosis and management of assessed problem(s)

## 2024-06-25 ENCOUNTER — APPOINTMENT (OUTPATIENT)
Dept: INTERNAL MEDICINE | Facility: CLINIC | Age: 84
End: 2024-06-25
Payer: MEDICARE

## 2024-06-25 VITALS
DIASTOLIC BLOOD PRESSURE: 70 MMHG | RESPIRATION RATE: 18 BRPM | HEIGHT: 64 IN | TEMPERATURE: 98.1 F | OXYGEN SATURATION: 98 % | HEART RATE: 75 BPM | BODY MASS INDEX: 24.24 KG/M2 | SYSTOLIC BLOOD PRESSURE: 144 MMHG | WEIGHT: 142 LBS

## 2024-06-25 DIAGNOSIS — E78.5 HYPERLIPIDEMIA, UNSPECIFIED: ICD-10-CM

## 2024-06-25 DIAGNOSIS — I10 ESSENTIAL (PRIMARY) HYPERTENSION: ICD-10-CM

## 2024-06-25 DIAGNOSIS — R53.82 CHRONIC FATIGUE, UNSPECIFIED: ICD-10-CM

## 2024-06-25 PROCEDURE — 99213 OFFICE O/P EST LOW 20 MIN: CPT | Mod: 25

## 2024-06-25 PROCEDURE — G2211 COMPLEX E/M VISIT ADD ON: CPT

## 2024-06-25 PROCEDURE — 96372 THER/PROPH/DIAG INJ SC/IM: CPT

## 2024-06-25 RX ORDER — DOXYCYCLINE HYCLATE 100 MG/1
100 CAPSULE ORAL
Qty: 6 | Refills: 0 | Status: COMPLETED | COMMUNITY
Start: 2024-04-18 | End: 2024-06-25

## 2024-06-25 RX ORDER — TRAMADOL HYDROCHLORIDE 50 MG/1
50 TABLET, COATED ORAL
Qty: 10 | Refills: 0 | Status: ACTIVE | COMMUNITY
Start: 2024-06-25 | End: 1900-01-01

## 2024-06-25 RX ORDER — CYANOCOBALAMIN 1000 UG/ML
1000 INJECTION INTRAMUSCULAR; SUBCUTANEOUS
Qty: 0 | Refills: 0 | Status: COMPLETED | OUTPATIENT
Start: 2024-06-25

## 2024-06-25 RX ADMIN — CYANOCOBALAMIN 0 MCG/ML: 1000 INJECTION INTRAMUSCULAR; SUBCUTANEOUS at 00:00

## 2024-07-10 ENCOUNTER — APPOINTMENT (OUTPATIENT)
Dept: NEPHROLOGY | Facility: CLINIC | Age: 84
End: 2024-07-10
Payer: MEDICARE

## 2024-07-10 VITALS — DIASTOLIC BLOOD PRESSURE: 68 MMHG | SYSTOLIC BLOOD PRESSURE: 136 MMHG | HEART RATE: 74 BPM

## 2024-07-10 DIAGNOSIS — E83.52 HYPERCALCEMIA: ICD-10-CM

## 2024-07-10 DIAGNOSIS — E79.0 HYPERURICEMIA W/OUT SIGNS OF INFLAMMATORY ARTHRITIS AND TOPHACEOUS DISEASE: ICD-10-CM

## 2024-07-10 DIAGNOSIS — E21.3 HYPERPARATHYROIDISM, UNSPECIFIED: ICD-10-CM

## 2024-07-10 DIAGNOSIS — N18.4 CHRONIC KIDNEY DISEASE, STAGE 4 (SEVERE): ICD-10-CM

## 2024-07-10 DIAGNOSIS — R80.9 PROTEINURIA, UNSPECIFIED: ICD-10-CM

## 2024-07-10 PROCEDURE — 99214 OFFICE O/P EST MOD 30 MIN: CPT

## 2024-07-10 PROCEDURE — G2211 COMPLEX E/M VISIT ADD ON: CPT

## 2024-07-11 ENCOUNTER — OFFICE (OUTPATIENT)
Dept: URBAN - METROPOLITAN AREA CLINIC 28 | Facility: CLINIC | Age: 84
Setting detail: OPHTHALMOLOGY
End: 2024-07-11
Payer: MEDICARE

## 2024-07-11 DIAGNOSIS — H40.013: ICD-10-CM

## 2024-07-11 DIAGNOSIS — H01.004: ICD-10-CM

## 2024-07-11 DIAGNOSIS — H01.001: ICD-10-CM

## 2024-07-11 DIAGNOSIS — H52.4: ICD-10-CM
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
## 2024-07-11 DIAGNOSIS — H47.233: ICD-10-CM

## 2024-07-11 DIAGNOSIS — H16.223: ICD-10-CM

## 2024-07-11 DIAGNOSIS — H40.063: ICD-10-CM

## 2024-07-11 DIAGNOSIS — H25.13: ICD-10-CM

## 2024-07-11 DIAGNOSIS — H02.403: ICD-10-CM

## 2024-07-11 DIAGNOSIS — H40.033: ICD-10-CM

## 2024-07-11 PROCEDURE — 92020 GONIOSCOPY: CPT | Performed by: OPHTHALMOLOGY

## 2024-07-11 PROCEDURE — 55555 MISCELLANEOUS CHARGES: CPT | Performed by: OPHTHALMOLOGY

## 2024-07-11 PROCEDURE — 92015 DETERMINE REFRACTIVE STATE: CPT | Performed by: OPHTHALMOLOGY

## 2024-07-11 PROCEDURE — 92014 COMPRE OPH EXAM EST PT 1/>: CPT | Performed by: OPHTHALMOLOGY

## 2024-07-11 ASSESSMENT — CONFRONTATIONAL VISUAL FIELD TEST (CVF)
OD_FINDINGS: FULL
OS_FINDINGS: FULL

## 2024-07-11 ASSESSMENT — LID EXAM ASSESSMENTS
OD_BLEPHARITIS: RUL 1+ 2+
OS_BLEPHARITIS: LUL 1+ 2+

## 2024-07-11 ASSESSMENT — LID POSITION - PTOSIS
OS_PTOSIS: LUL 1+ 2+
OD_PTOSIS: RUL 2+ 3+

## 2024-08-18 NOTE — ED ADULT NURSE NOTE - DISCHARGE DATE/TIME
Problem: Pain - Pediatric  Goal: Verbalizes/displays adequate comfort level or baseline comfort level  Outcome: Progressing     Problem: Safety Pediatric - Fall  Goal: Free from fall injury  Outcome: Progressing     Problem: Pain  Goal: Turns in bed with improved pain control throughout the shift  Outcome: Progressing  Goal: Walks with improved pain control throughout the shift  Outcome: Progressing  Goal: Performs ADL's with improved pain control throughout shift  Outcome: Progressing   The patient's goals for the shift include      The clinical goals for the shift include Patient will be free from falls this shift.    VS remained stable this shift.  Patient ambulated to bathroom and had whole body tremors observed by this RN, patient calm when in bed and no shaking observed, neuro checks WNL.  Tylenol and zofran given for nausea/abdominal pain, patient reported to mom that she felt nauseated after taking PO meds but patient was sleeping on my assessment.  Patient slept well overnight, continues on MIVF.  Mom and stepdad at bedside, active in care.  Awaiting urine sample, patient voided but missed hat in toilet.   16-Dec-2021 00:53

## 2024-08-26 ENCOUNTER — RX RENEWAL (OUTPATIENT)
Age: 84
End: 2024-08-26

## 2024-09-03 ENCOUNTER — APPOINTMENT (OUTPATIENT)
Dept: INTERNAL MEDICINE | Facility: CLINIC | Age: 84
End: 2024-09-03
Payer: MEDICARE

## 2024-09-03 VITALS
TEMPERATURE: 97.8 F | DIASTOLIC BLOOD PRESSURE: 69 MMHG | WEIGHT: 152 LBS | BODY MASS INDEX: 25.95 KG/M2 | OXYGEN SATURATION: 97 % | SYSTOLIC BLOOD PRESSURE: 160 MMHG | HEIGHT: 64 IN | HEART RATE: 72 BPM

## 2024-09-03 VITALS — SYSTOLIC BLOOD PRESSURE: 157 MMHG | DIASTOLIC BLOOD PRESSURE: 75 MMHG

## 2024-09-03 DIAGNOSIS — Z86.19 PERSONAL HISTORY OF OTHER INFECTIOUS AND PARASITIC DISEASES: ICD-10-CM

## 2024-09-03 DIAGNOSIS — N18.4 CHRONIC KIDNEY DISEASE, STAGE 4 (SEVERE): ICD-10-CM

## 2024-09-03 DIAGNOSIS — M81.0 AGE-RELATED OSTEOPOROSIS W/OUT CURRENT PATHOLOGICAL FRACTURE: ICD-10-CM

## 2024-09-03 DIAGNOSIS — Z00.00 ENCOUNTER FOR GENERAL ADULT MEDICAL EXAMINATION W/OUT ABNORMAL FINDINGS: ICD-10-CM

## 2024-09-03 DIAGNOSIS — I10 ESSENTIAL (PRIMARY) HYPERTENSION: ICD-10-CM

## 2024-09-03 DIAGNOSIS — R82.90 UNSPECIFIED ABNORMAL FINDINGS IN URINE: ICD-10-CM

## 2024-09-03 DIAGNOSIS — E78.5 HYPERLIPIDEMIA, UNSPECIFIED: ICD-10-CM

## 2024-09-03 PROCEDURE — G0439: CPT

## 2024-09-03 PROCEDURE — 96372 THER/PROPH/DIAG INJ SC/IM: CPT

## 2024-09-03 PROCEDURE — G0444 DEPRESSION SCREEN ANNUAL: CPT

## 2024-09-03 PROCEDURE — 36415 COLL VENOUS BLD VENIPUNCTURE: CPT

## 2024-09-03 RX ORDER — CYANOCOBALAMIN 1000 UG/ML
1000 INJECTION INTRAMUSCULAR; SUBCUTANEOUS
Qty: 0 | Refills: 0 | Status: COMPLETED | OUTPATIENT
Start: 2024-09-03

## 2024-09-03 RX ADMIN — CYANOCOBALAMIN 0 MCG/ML: 1000 INJECTION INTRAMUSCULAR; SUBCUTANEOUS at 00:00

## 2024-09-03 NOTE — PHYSICAL EXAM
[No Acute Distress] : no acute distress [Well-Appearing] : well-appearing [Normal Sclera/Conjunctiva] : normal sclera/conjunctiva [EOMI] : extraocular movements intact [Normal Outer Ear/Nose] : the outer ears and nose were normal in appearance [Normal Oropharynx] : the oropharynx was normal [No JVD] : no jugular venous distention [No Lymphadenopathy] : no lymphadenopathy [Supple] : supple [Thyroid Normal, No Nodules] : the thyroid was normal and there were no nodules present [No Respiratory Distress] : no respiratory distress  [No Accessory Muscle Use] : no accessory muscle use [Clear to Auscultation] : lungs were clear to auscultation bilaterally [Normal Rate] : normal rate  [Regular Rhythm] : with a regular rhythm [Normal S1, S2] : normal S1 and S2 [No Murmur] : no murmur heard [Soft] : abdomen soft [Non Tender] : non-tender [Non-distended] : non-distended [No Masses] : no abdominal mass palpated [No HSM] : no HSM [Normal Bowel Sounds] : normal bowel sounds [Normal Posterior Cervical Nodes] : no posterior cervical lymphadenopathy [Normal Anterior Cervical Nodes] : no anterior cervical lymphadenopathy [No Spinal Tenderness] : no spinal tenderness [Grossly Normal Strength/Tone] : grossly normal strength/tone [No Rash] : no rash [Coordination Grossly Intact] : coordination grossly intact [Normal Gait] : normal gait [Normal Affect] : the affect was normal [Normal Insight/Judgement] : insight and judgment were intact [Kyphosis] : kyphosis [de-identified] : +1 BLLE edema [de-identified] : R 1st digit swelling from gout flare.

## 2024-09-03 NOTE — HISTORY OF PRESENT ILLNESS
[FreeTextEntry1] : annual exam [de-identified] : 82 y/o female presents for annual exam.   HTN: -currently on ramipril and amlodipine.  Gout: - Gout flare up R 1st digit and R ankle/leg - Aleve and tramadol helps with pain management.  - Went to [facility] within the last month for gout flare up. [facility] recommended to start prednisone 50mg taper however patient did a 30mg prednisone taper. However pt refused to take higher dose makes her depressed.  - currently off prednisone for 1 week.  - upset about weight gain with prednisone.   Constipation: - states prednisone makes her constipated at times - Constipation alleviated with prunes.   Vit B 12: - states in last visit vit B 12 shot was given - interested in getting another shot today.  Folic acid level: - concerned about folic acid level  - previously on 400mg in the past - currently on 1mg  Panic attack: - last panic attack a few months ago which leaded to ED visit at [facility].  - started natural remedy: tranquil rx tea with passionflower extra, camiflower - has not taken diazepam since last panic attack episode.   Night sweats: - still has night sweats that occur for days in a row and then stops for a few months.  Vaccinations: - interested in COVID booster and flu shot - last COVID injection pt has increased fatigue. - stated she will get flu and COVID vaccination  in october.   HCM: Optho: UTD Dentist: UTD Mammo: UTD DEXA: states has appt in dec.  Derm: UTD Colonoscopy: 2022

## 2024-09-03 NOTE — END OF VISIT
[FreeTextEntry3] : I personally performed the service described in the documentation, reviewed the documentation recorded by student DESTINEE Storey  in my presence and it accurately and completely records my words and actions.

## 2024-09-03 NOTE — HEALTH RISK ASSESSMENT
[0] : 2) Feeling down, depressed, or hopeless: Not at all (0) [PHQ-2 Negative - No further assessment needed] : PHQ-2 Negative - No further assessment needed [Time Spent: ___ Minutes] : I spent [unfilled] minutes performing a depression screening for this patient. [Never] : Never [[id number]] : 0

## 2024-09-04 ENCOUNTER — OFFICE (OUTPATIENT)
Dept: URBAN - METROPOLITAN AREA CLINIC 28 | Facility: CLINIC | Age: 84
Setting detail: OPHTHALMOLOGY
End: 2024-09-04
Payer: MEDICARE

## 2024-09-04 DIAGNOSIS — H40.063: ICD-10-CM

## 2024-09-04 DIAGNOSIS — H02.403: ICD-10-CM

## 2024-09-04 DIAGNOSIS — H02.134: ICD-10-CM

## 2024-09-04 DIAGNOSIS — H40.013: ICD-10-CM

## 2024-09-04 DIAGNOSIS — H02.131: ICD-10-CM

## 2024-09-04 DIAGNOSIS — H01.004: ICD-10-CM

## 2024-09-04 DIAGNOSIS — H40.033: ICD-10-CM

## 2024-09-04 DIAGNOSIS — H47.233: ICD-10-CM

## 2024-09-04 DIAGNOSIS — H01.001: ICD-10-CM

## 2024-09-04 DIAGNOSIS — H16.223: ICD-10-CM

## 2024-09-04 DIAGNOSIS — H02.825: ICD-10-CM

## 2024-09-04 DIAGNOSIS — H25.13: ICD-10-CM

## 2024-09-04 PROCEDURE — 99213 OFFICE O/P EST LOW 20 MIN: CPT | Performed by: OPHTHALMOLOGY

## 2024-09-04 ASSESSMENT — LID POSITION - PTOSIS
OD_PTOSIS: RUL 2+ 3+
OS_PTOSIS: LUL 1+ 2+

## 2024-09-04 ASSESSMENT — LID EXAM ASSESSMENTS
OS_COMMENTS: 1+ 2+ ECTROPIO
OS_BLEPHARITIS: LUL 1+ 2+
OD_BLEPHARITIS: RUL 1+ 2+
OD_COMMENTS: 1+ 2+ ECTROPIO

## 2024-09-04 ASSESSMENT — CONFRONTATIONAL VISUAL FIELD TEST (CVF)
OD_FINDINGS: FULL
OS_FINDINGS: FULL

## 2024-09-06 LAB
ALBUMIN SERPL ELPH-MCNC: 4.3 G/DL
ALP BLD-CCNC: 84 U/L
ALT SERPL-CCNC: 10 U/L
ANION GAP SERPL CALC-SCNC: 12 MMOL/L
APPEARANCE: CLEAR
AST SERPL-CCNC: 18 U/L
BACTERIA UR CULT: ABNORMAL
BACTERIA: ABNORMAL /HPF
BASOPHILS # BLD AUTO: 0.04 K/UL
BASOPHILS NFR BLD AUTO: 0.7 %
BILIRUB SERPL-MCNC: 0.2 MG/DL
BILIRUBIN URINE: NEGATIVE
BLOOD URINE: NEGATIVE
BUN SERPL-MCNC: 31 MG/DL
CALCIUM SERPL-MCNC: 10.4 MG/DL
CAST: 1 /LPF
CHLORIDE SERPL-SCNC: 105 MMOL/L
CHOLEST SERPL-MCNC: 160 MG/DL
CO2 SERPL-SCNC: 23 MMOL/L
COLOR: YELLOW
CREAT SERPL-MCNC: 1.46 MG/DL
EGFR: 36 ML/MIN/1.73M2
EOSINOPHIL # BLD AUTO: 0.29 K/UL
EOSINOPHIL NFR BLD AUTO: 5 %
EPITHELIAL CELLS: 1 /HPF
ESTIMATED AVERAGE GLUCOSE: 117 MG/DL
FOLATE SERPL-MCNC: >20 NG/ML
GLUCOSE QUALITATIVE U: NEGATIVE MG/DL
GLUCOSE SERPL-MCNC: 91 MG/DL
HBA1C MFR BLD HPLC: 5.7 %
HCT VFR BLD CALC: 35.7 %
HCV AB SER QL: NONREACTIVE
HCV S/CO RATIO: 0.2 S/CO
HCYS SERPL-MCNC: 17.3 UMOL/L
HDLC SERPL-MCNC: 87 MG/DL
HGB BLD-MCNC: 11 G/DL
IMM GRANULOCYTES NFR BLD AUTO: 0.3 %
KETONES URINE: NEGATIVE MG/DL
LDLC SERPL CALC-MCNC: 61 MG/DL
LEUKOCYTE ESTERASE URINE: ABNORMAL
LYMPHOCYTES # BLD AUTO: 1.07 K/UL
LYMPHOCYTES NFR BLD AUTO: 18.6 %
MAN DIFF?: NORMAL
MCHC RBC-ENTMCNC: 30.1 PG
MCHC RBC-ENTMCNC: 30.8 GM/DL
MCV RBC AUTO: 97.8 FL
MICROSCOPIC-UA: NORMAL
MONOCYTES # BLD AUTO: 0.69 K/UL
MONOCYTES NFR BLD AUTO: 12 %
NEUTROPHILS # BLD AUTO: 3.64 K/UL
NEUTROPHILS NFR BLD AUTO: 63.4 %
NITRITE URINE: POSITIVE
NONHDLC SERPL-MCNC: 73 MG/DL
PH URINE: 6.5
PLATELET # BLD AUTO: 278 K/UL
POTASSIUM SERPL-SCNC: 4.8 MMOL/L
PROT SERPL-MCNC: 6.8 G/DL
PROTEIN URINE: NEGATIVE MG/DL
RBC # BLD: 3.65 M/UL
RBC # FLD: 15.3 %
RED BLOOD CELLS URINE: NORMAL /HPF
REVIEW: NORMAL
SODIUM SERPL-SCNC: 139 MMOL/L
SPECIFIC GRAVITY URINE: 1.01
TRIGL SERPL-MCNC: 64 MG/DL
TSH SERPL-ACNC: 0.78 UIU/ML
UROBILINOGEN URINE: 0.2 MG/DL
VIT B12 SERPL-MCNC: 430 PG/ML
WBC # FLD AUTO: 5.75 K/UL
WHITE BLOOD CELLS URINE: 3 /HPF

## 2024-09-08 NOTE — ED ADULT NURSE NOTE - NS ED NURSE RECORD ANOTHER VITAL SIGN
HPI   Chief Complaint   Patient presents with    Syncope    Leg Pain     Patient was getting up with home health and she stated he passed out and fell. He is complaining of right lower leg pain. Previous history of breaking it in July. Also the nurse was concerned that is neck radiation spot was looking infected.        56-year-old male with laryngeal cancer on radiation, chronic wound left thigh with wound VAC, status post right ankle fracture presents with fall, syncopal episode.  Complaining of pain in his right lower leg.  Sent in by wound care nurse when he had a syncopal episode at home.      History provided by:  Patient, medical records and EMS personnel          Patient History   Past Medical History:   Diagnosis Date    Acute upper respiratory infection, unspecified 03/20/2015    Acute upper respiratory infection    Alcohol abuse, in remission 02/12/2015    History of ETOH abuse    Alcohol dependence, in remission (Multi) 05/07/2015    Alcohol dependence in remission    Allergic contact dermatitis due to plants, except food 05/21/2014    Contact dermatitis due to poison ivy    Allergy status to unspecified drugs, medicaments and biological substances 08/29/2013    History of allergy    Anxiety disorder, unspecified 03/20/2015    Anxiety    Bipolar disorder, current episode manic without psychotic features, unspecified (Multi)     Bipolar disorder, current episode manic without psychotic features    Cervicalgia     Cervicalgia    Chronic obstructive pulmonary disease, unspecified (Multi) 12/02/2014    Chronic asthmatic bronchitis    Encounter for immunization 09/22/2016    Flu vaccine need    Hypertension     Other cervical disc degeneration, unspecified cervical region     Degeneration of cervical intervertebral disc    Other conditions influencing health status 06/27/2013    Acute Inflammation Of The Orbit    Other long term (current) drug therapy 06/02/2015    High risk medication use    Other specified  health status     No pertinent past surgical history    Personal history of nicotine dependence 08/16/2017    History of tobacco abuse    Personal history of nicotine dependence 06/26/2017    History of nicotine dependence    Personal history of other diseases of the digestive system 04/11/2013    History of gastroenteritis    Personal history of other diseases of the digestive system 08/27/2015    History of esophageal reflux    Personal history of other diseases of the respiratory system 03/20/2017    History of sore throat    Personal history of other diseases of the respiratory system 02/05/2014    History of sinusitis    Personal history of other diseases of the respiratory system 01/16/2014    History of allergic rhinitis    Personal history of other diseases of the respiratory system 09/26/2013    History of acute sinusitis    Personal history of other diseases of the respiratory system 06/26/2014    History of allergic rhinitis    Personal history of other infectious and parasitic diseases     History of hepatitis C    Personal history of other mental and behavioral disorders     History of depression    Personal history of other specified conditions 08/25/2016    History of dizziness    Personal history of other specified conditions 02/16/2017    History of abdominal pain    Unspecified asthma, uncomplicated ([facility]) 02/13/2014    Asthmatic bronchitis     Past Surgical History:   Procedure Laterality Date    CHOLECYSTECTOMY      CT ABDOMEN PELVIS ANGIOGRAM W AND/OR WO IV CONTRAST  04/01/2020    CT ABDOMEN PELVIS ANGIOGRAM W AND/OR WO IV CONTRAST 4/1/2020 [facility]    INVASIVE VASCULAR PROCEDURE N/A 11/08/2023    Procedure: Pulmonary Angiogram;  Surgeon: Surya Templeton MD;  Location: [facility] Cardiac Cath Lab;  Service: Cardiovascular;  Laterality: N/A;    MR HEAD ANGIO WO IV CONTRAST  01/24/2016    MR HEAD ANGIO WO IV CONTRAST 1/24/2016 [facility]    MR NECK ANGIO WO IV CONTRAST   01/24/2016    MR NECK ANGIO WO IV CONTRAST 1/24/2016 [facility]     No family history on file.  Social History     Tobacco Use    Smoking status: Every Day     Current packs/day: 0.50     Types: Cigarettes    Smokeless tobacco: Never   Substance Use Topics    Alcohol use: Not Currently    Drug use: Yes     Types: Methamphetamines     Comment: Night before       Physical Exam   ED Triage Vitals [09/08/24 1301]   Temperature Heart Rate Respirations BP   36.1 °C (97 °F) 84 16 (!) 109/93      Pulse Ox Temp Source Heart Rate Source Patient Position   100 % Oral Monitor Lying      BP Location FiO2 (%)     Right arm --       Physical Exam  Vitals and nursing note reviewed.   Constitutional:       General: He is not in acute distress.     Appearance: He is well-developed.   HENT:      Head: Normocephalic and atraumatic.   Eyes:      Conjunctiva/sclera: Conjunctivae normal.   Neck:      Comments: Erythema and skin thickening consistent with active radiation therapy.  Cardiovascular:      Rate and Rhythm: Normal rate and regular rhythm.      Heart sounds: No murmur heard.  Pulmonary:      Effort: Pulmonary effort is normal. No respiratory distress.      Breath sounds: Normal breath sounds.   Abdominal:      Palpations: Abdomen is soft.      Tenderness: There is no abdominal tenderness.   Musculoskeletal:         General: Tenderness present. No swelling or deformity.      Cervical back: Neck supple.      Right lower leg: No edema.      Comments: Head normocephalic atraumatic.  No midline tenderness step-offs or deformities of the entire spinal column.  Chest wall stable, secure.  No bony tenderness to bilateral upper extremities or left lower extremity.  He has tenderness to the right lower leg and ankle.  Prior surgical incisions noted.  They are healing well.   Skin:     General: Skin is warm and dry.      Capillary Refill: Capillary refill takes less than 2 seconds.   Neurological:      Mental Status: He is alert.       Cranial Nerves: No cranial nerve deficit.      Sensory: No sensory deficit.      Coordination: Coordination normal.   Psychiatric:         Mood and Affect: Mood normal.           ED Course & MDM   ED Course as of 09/08/24 1754   Sun Sep 08, 2024   1316 ECG 12 lead  EKG interpreted by me shows sinus rhythm with rate of 81.  Normal intervals.  No acute injury pattern. [BT]   1413 56-year-old male presents with right lower leg pain after syncopal episode with fall.  Labs.  X-ray of right tib-fib, ankle, and foot.  CT head. [BT]   1459 POTASSIUM(!): 3.3  Replaced with oral potassium [BT]   1459 MAGNESIUM(!): 1.43  Given 2 g mag sulfate [BT]   1501 ECG 12 lead  EKG interpreted by me shows sinus rhythm with rate of 71.  Prolonged QT at QTc 480.  Nonspecific ST-T changes.  No acute injury pattern. [BT]   1547 Troponin I Series, High Sensitivity (0, 1 HR)  Troponin negative x 2.  EKG without acute injury pattern.  Doubt acute coronary syndrome. [BT]   1641 XR ankle right 3+ views  IMPRESSION:  Right Foot: No acute fracture or malalignment  Right Ankle: Post surgical changes with healing distal tibial-fibular  fractures with intact hardware.  Soft tissue swelling.  Right Tibia and Fibula: No acute fracture or malalignment.  Soft  tissue swelling.    Noted [BT]   1641 XR chest 1 view  No acute process on chest x-ray [BT]   1641 CT head wo IV contrast  CT head with no acute process [BT]   1702 Patient tried getting out of bed here in the emergency department and he almost fell. [BT]   1702 He does not feel safe at home.  He is dehydrated and has gait instability.  He had a syncopal episode today.  He will be hospitalized for further evaluation management of syncope, impaired ADLs, gait instability in setting of laryngeal cancer. [BT]   1753 Spoke with Nancy.  They feel he is too complex for their facility.  Spoke with medicine hospitalist Dr. Avila at [facility] who accepted patient for transfer for syncope, fall.  [BT]      ED Course User Index  [BT] Yves Nielson DO         Diagnoses as of 09/08/24 1754   Syncope, unspecified syncope type   Pain of right lower leg   Laryngeal cancer (Multi)   Hypokalemia   Hypomagnesemia   Gait instability   Impaired mobility and ADLs                 No data recorded     [place] Coma Scale Score: 15 (09/08/24 1511 : Alvina Blue RN)                           Medical Decision Making      Procedure  Procedures     Yves Nielson DO  09/08/24 1750     Yes

## 2024-10-02 ENCOUNTER — APPOINTMENT (OUTPATIENT)
Dept: INTERNAL MEDICINE | Facility: CLINIC | Age: 84
End: 2024-10-02
Payer: MEDICARE

## 2024-10-02 ENCOUNTER — MED ADMIN CHARGE (OUTPATIENT)
Age: 84
End: 2024-10-02

## 2024-10-02 VITALS
SYSTOLIC BLOOD PRESSURE: 122 MMHG | HEIGHT: 64 IN | BODY MASS INDEX: 25.27 KG/M2 | HEART RATE: 80 BPM | RESPIRATION RATE: 17 BRPM | OXYGEN SATURATION: 97 % | TEMPERATURE: 98.1 F | WEIGHT: 148 LBS | DIASTOLIC BLOOD PRESSURE: 69 MMHG

## 2024-10-02 DIAGNOSIS — R45.89 OTHER SYMPTOMS AND SIGNS INVOLVING EMOTIONAL STATE: ICD-10-CM

## 2024-10-02 DIAGNOSIS — Z63.4 DISAPPEARANCE AND DEATH OF FAMILY MEMBER: ICD-10-CM

## 2024-10-02 DIAGNOSIS — R14.0 ABDOMINAL DISTENSION (GASEOUS): ICD-10-CM

## 2024-10-02 DIAGNOSIS — F41.0 PANIC DISORDER [EPISODIC PAROXYSMAL ANXIETY]: ICD-10-CM

## 2024-10-02 PROCEDURE — G0008: CPT

## 2024-10-02 PROCEDURE — G2211 COMPLEX E/M VISIT ADD ON: CPT

## 2024-10-02 PROCEDURE — 99214 OFFICE O/P EST MOD 30 MIN: CPT

## 2024-10-02 PROCEDURE — 83014 H PYLORI DRUG ADMIN: CPT

## 2024-10-02 PROCEDURE — 90662 IIV NO PRSV INCREASED AG IM: CPT

## 2024-10-02 SDOH — SOCIAL STABILITY - SOCIAL INSECURITY: DISSAPEARANCE AND DEATH OF FAMILY MEMBER: Z63.4

## 2024-10-04 PROBLEM — R14.0 ABDOMINAL BLOATING: Status: ACTIVE | Noted: 2024-10-02

## 2024-10-04 PROBLEM — R45.89 DEPRESSED MOOD: Status: ACTIVE | Noted: 2024-10-04

## 2024-10-04 PROBLEM — F41.0 PANIC ATTACK: Status: ACTIVE | Noted: 2024-10-02

## 2024-10-04 LAB — UREA BREATH TEST QL: NEGATIVE

## 2024-10-04 NOTE — HEALTH RISK ASSESSMENT
[Never] : Never [2] : 1) Little interest or pleasure doing things for more than half of the days (2) [3] : 2) Feeling down, depressed, or hopeless for nearly every day (3) [PHQ-2 Positive] : PHQ-2 Positive [I have developed a follow-up plan documented below in the note.] : I have developed a follow-up plan documented below in the note.

## 2024-10-04 NOTE — HISTORY OF PRESENT ILLNESS
[FreeTextEntry1] : f/u [de-identified] : Pt is an 84 y/o F with PMHx of HTN, HLD, hypothyroidism, gout, panic attack who presents to the office today for f/u  Abd bloating: - 2 weeks ago, it began - eating normal, no emesis and normal bowels - bloating is persistent - taking prevacid, some relief with gas-x - bowels and urination are unchanged - no pain but "discomfort"  R hand; - "inflamed tendons" she went to urgent care and then ortho - was on prednisone taper that ended 2 weeks ago (avoids NSAIDs due to renal function) - improved since prednisone taper  Grief: - has been dealing with flashbacks and ruminating thoughts regarding the death of her friend - mood is down and depressed when she thinks about it - she had tried paxil, zoloft, and another medication in the past but she gained "a lot of weight" - Had a panic attack last week. She has drops that she will use that have herbal supplements that help her manage her anxiety

## 2024-10-04 NOTE — PHYSICAL EXAM
[No Acute Distress] : no acute distress [Normal Sclera/Conjunctiva] : normal sclera/conjunctiva [No Edema] : there was no peripheral edema [Soft] : abdomen soft [Non Tender] : non-tender [Non-distended] : non-distended [No Masses] : no abdominal mass palpated [No HSM] : no HSM [Normal] : no rash [Normal Insight/Judgement] : insight and judgment were intact [de-identified] : hyperactive bowel sounds [de-identified] : tearful, depressed mood

## 2024-10-04 NOTE — HISTORY OF PRESENT ILLNESS
[FreeTextEntry1] : f/u [de-identified] : Pt is an 84 y/o F with PMHx of HTN, HLD, hypothyroidism, gout, panic attack who presents to the office today for f/u  Abd bloating: - 2 weeks ago, it began - eating normal, no emesis and normal bowels - bloating is persistent - taking prevacid, some relief with gas-x - bowels and urination are unchanged - no pain but "discomfort"  R hand; - "inflamed tendons" she went to urgent care and then ortho - was on prednisone taper that ended 2 weeks ago (avoids NSAIDs due to renal function) - improved since prednisone taper  Grief: - has been dealing with flashbacks and ruminating thoughts regarding the death of her friend - mood is down and depressed when she thinks about it - she had tried paxil, zoloft, and another medication in the past but she gained "a lot of weight" - Had a panic attack last week. She has drops that she will use that have herbal supplements that help her manage her anxiety

## 2024-10-04 NOTE — PHYSICAL EXAM
[No Acute Distress] : no acute distress [Normal Sclera/Conjunctiva] : normal sclera/conjunctiva [No Edema] : there was no peripheral edema [Soft] : abdomen soft [Non Tender] : non-tender [Non-distended] : non-distended [No Masses] : no abdominal mass palpated [No HSM] : no HSM [Normal] : no rash [Normal Insight/Judgement] : insight and judgment were intact [de-identified] : hyperactive bowel sounds [de-identified] : tearful, depressed mood

## 2024-10-09 ENCOUNTER — APPOINTMENT (OUTPATIENT)
Dept: INTERNAL MEDICINE | Facility: CLINIC | Age: 84
End: 2024-10-09

## 2024-10-22 ENCOUNTER — RX ONLY (RX ONLY)
Age: 84
End: 2024-10-22

## 2024-10-22 ENCOUNTER — OFFICE (OUTPATIENT)
Dept: URBAN - METROPOLITAN AREA CLINIC 28 | Facility: CLINIC | Age: 84
Setting detail: OPHTHALMOLOGY
End: 2024-10-22
Payer: MEDICARE

## 2024-10-22 DIAGNOSIS — H40.013: ICD-10-CM

## 2024-10-22 DIAGNOSIS — H01.005: ICD-10-CM

## 2024-10-22 DIAGNOSIS — H25.13: ICD-10-CM

## 2024-10-22 DIAGNOSIS — H40.033: ICD-10-CM

## 2024-10-22 DIAGNOSIS — H01.002: ICD-10-CM

## 2024-10-22 DIAGNOSIS — H47.233: ICD-10-CM

## 2024-10-22 DIAGNOSIS — H01.004: ICD-10-CM

## 2024-10-22 DIAGNOSIS — H16.223: ICD-10-CM

## 2024-10-22 DIAGNOSIS — H02.131: ICD-10-CM

## 2024-10-22 DIAGNOSIS — H40.063: ICD-10-CM

## 2024-10-22 DIAGNOSIS — H01.001: ICD-10-CM

## 2024-10-22 DIAGNOSIS — H02.403: ICD-10-CM

## 2024-10-22 PROBLEM — H02.135 ECTROPION-SENILE; RIGHT UPPER LID, RIGHT LOWER LID, LEFT UPPER LID, LEFT LOWER LID: Status: ACTIVE | Noted: 2024-10-22

## 2024-10-22 PROBLEM — H02.134 ECTROPION-SENILE; RIGHT UPPER LID, RIGHT LOWER LID, LEFT UPPER LID, LEFT LOWER LID: Status: ACTIVE | Noted: 2024-10-22

## 2024-10-22 PROBLEM — H02.132 ECTROPION-SENILE; RIGHT UPPER LID, RIGHT LOWER LID, LEFT UPPER LID, LEFT LOWER LID: Status: ACTIVE | Noted: 2024-10-22

## 2024-10-22 PROCEDURE — 92012 INTRM OPH EXAM EST PATIENT: CPT | Performed by: OPHTHALMOLOGY

## 2024-10-22 PROCEDURE — 92250 FUNDUS PHOTOGRAPHY W/I&R: CPT | Performed by: OPHTHALMOLOGY

## 2024-10-22 ASSESSMENT — REFRACTION_AUTOREFRACTION
OS_SPHERE: +1.75
OS_CYLINDER: -0.25
OD_CYLINDER: -0.50
OS_AXIS: 067
OD_SPHERE: +3.00
OD_AXIS: 106

## 2024-10-22 ASSESSMENT — REFRACTION_CURRENTRX
OD_AXIS: 180
OS_AXIS: 180
OS_AXIS: 066
OS_SPHERE: +1.50
OD_CYLINDER: 0.00
OS_VPRISM_DIRECTION: SV
OS_SPHERE: +3.50
OD_CYLINDER: 0.00
OS_VPRISM_DIRECTION: SV
OS_CYLINDER: 0.00
OD_AXIS: 180
OS_AXIS: 072
OD_SPHERE: +5.00
OS_SPHERE: +1.75
OD_VPRISM_DIRECTION: SV
OS_AXIS: 180
OS_CYLINDER: -0.50
OD_OVR_VA: 20/
OD_SPHERE: +1.50
OS_SPHERE: +4.25
OS_CYLINDER: 0.00
OD_CYLINDER: 0.00
OD_AXIS: 180
OD_VPRISM_DIRECTION: SV
OS_OVR_VA: 20/
OD_CYLINDER: 0.00
OD_CYLINDER: 0.00
OD_VPRISM_DIRECTION: SV
OS_CYLINDER: -0.50
OS_SPHERE: +4.50
OD_OVR_VA: 20/
OD_AXIS: 180
OD_OVR_VA: 20/
OS_VPRISM_DIRECTION: SV
OS_CYLINDER: 0.00
OD_AXIS: 147
OD_SPHERE: +5.00
OD_SPHERE: +2.50
OD_VPRISM_DIRECTION: SV
OS_OVR_VA: 20/
OD_SPHERE: +3.50
OS_OVR_VA: 20/
OD_CYLINDER: -0.50
OS_CYLINDER: 0.00
OS_AXIS: 180
OD_SPHERE: +1.00
OS_AXIS: 180
OS_SPHERE: +1.25
OD_AXIS: 180
OS_VPRISM_DIRECTION: SV

## 2024-10-22 ASSESSMENT — PACHYMETRY
OD_CT_CORRECTION: -2
OS_CT_UM: 583
OS_CT_CORRECTION: -3
OD_CT_UM: 576

## 2024-10-22 ASSESSMENT — TEAR BREAK UP TIME (TBUT)
OD_TBUT: 2+
OS_TBUT: 2+

## 2024-10-22 ASSESSMENT — KERATOMETRY
OS_K1POWER_DIOPTERS: 44.25
OS_AXISANGLE_DEGREES: 084
METHOD_AUTO_MANUAL: AUTO
OD_AXISANGLE_DEGREES: 083
OD_K1POWER_DIOPTERS: 44.00
OS_K2POWER_DIOPTERS: 45.00
OD_K2POWER_DIOPTERS: 44.25

## 2024-10-22 ASSESSMENT — LID EXAM ASSESSMENTS
OD_BLEPHARITIS: RLL RUL T
OS_BLEPHARITIS: LLL LUL T
OD_COMMENTS: 1+ 2+ ECTROPIO
OS_COMMENTS: 1+ 2+ ECTROPIO

## 2024-10-22 ASSESSMENT — LID POSITION - PTOSIS
OS_PTOSIS: LUL 1+ 2+
OD_PTOSIS: RUL 2+ 3+

## 2024-10-22 ASSESSMENT — REFRACTION_MANIFEST
OD_SPHERE: +2.50
OD_ADD: +2.50
OS_SPHERE: +1.75
OS_ADD: +2.50

## 2024-10-22 ASSESSMENT — VISUAL ACUITY
OS_BCVA: 20/25-1
OD_BCVA: 20/20-1

## 2024-10-22 ASSESSMENT — TONOMETRY
OD_IOP_MMHG: 18
OS_IOP_MMHG: 19

## 2024-10-22 ASSESSMENT — CONFRONTATIONAL VISUAL FIELD TEST (CVF)
OD_FINDINGS: FULL
OS_FINDINGS: FULL

## 2024-10-23 ENCOUNTER — EMERGENCY (EMERGENCY)
Facility: HOSPITAL | Age: 84
LOS: 1 days | Discharge: HOME | End: 2024-10-23
Attending: STUDENT IN AN ORGANIZED HEALTH CARE EDUCATION/TRAINING PROGRAM | Admitting: STUDENT IN AN ORGANIZED HEALTH CARE EDUCATION/TRAINING PROGRAM
Payer: SELF-PAY

## 2024-10-23 VITALS
RESPIRATION RATE: 18 BRPM | OXYGEN SATURATION: 98 % | SYSTOLIC BLOOD PRESSURE: 170 MMHG | TEMPERATURE: 98 F | HEART RATE: 78 BPM | DIASTOLIC BLOOD PRESSURE: 90 MMHG | WEIGHT: 139.99 LBS

## 2024-10-23 VITALS — HEART RATE: 78 BPM | DIASTOLIC BLOOD PRESSURE: 78 MMHG | SYSTOLIC BLOOD PRESSURE: 138 MMHG

## 2024-10-23 DIAGNOSIS — S80.01XA CONTUSION OF RIGHT KNEE, INITIAL ENCOUNTER: ICD-10-CM

## 2024-10-23 DIAGNOSIS — S01.81XA LACERATION WITHOUT FOREIGN BODY OF OTHER PART OF HEAD, INITIAL ENCOUNTER: ICD-10-CM

## 2024-10-23 DIAGNOSIS — S80.02XA CONTUSION OF LEFT KNEE, INITIAL ENCOUNTER: ICD-10-CM

## 2024-10-23 DIAGNOSIS — Z88.8 ALLERGY STATUS TO OTHER DRUGS, MEDICAMENTS AND BIOLOGICAL SUBSTANCES: ICD-10-CM

## 2024-10-23 DIAGNOSIS — M54.2 CERVICALGIA: ICD-10-CM

## 2024-10-23 DIAGNOSIS — Z88.2 ALLERGY STATUS TO SULFONAMIDES: ICD-10-CM
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
## 2024-10-23 DIAGNOSIS — S09.90XA UNSPECIFIED INJURY OF HEAD, INITIAL ENCOUNTER: ICD-10-CM

## 2024-10-23 DIAGNOSIS — Z88.0 ALLERGY STATUS TO PENICILLIN: ICD-10-CM
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
## 2024-10-23 DIAGNOSIS — Z88.1 ALLERGY STATUS TO OTHER ANTIBIOTIC AGENTS: ICD-10-CM
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
## 2024-10-23 DIAGNOSIS — I10 ESSENTIAL (PRIMARY) HYPERTENSION: ICD-10-CM

## 2024-10-23 DIAGNOSIS — Z90.710 ACQUIRED ABSENCE OF BOTH CERVIX AND UTERUS: Chronic | ICD-10-CM

## 2024-10-23 DIAGNOSIS — E78.5 HYPERLIPIDEMIA, UNSPECIFIED: ICD-10-CM

## 2024-10-23 PROCEDURE — 73562 X-RAY EXAM OF KNEE 3: CPT

## 2024-10-23 PROCEDURE — 72125 CT NECK SPINE W/O DYE: CPT | Mod: MC

## 2024-10-23 PROCEDURE — 99284 EMERGENCY DEPT VISIT MOD MDM: CPT

## 2024-10-23 PROCEDURE — 73562 X-RAY EXAM OF KNEE 3: CPT | Mod: 26,50

## 2024-10-23 PROCEDURE — 70450 CT HEAD/BRAIN W/O DYE: CPT | Mod: MC

## 2024-10-23 PROCEDURE — 99284 EMERGENCY DEPT VISIT MOD MDM: CPT | Mod: 25

## 2024-10-23 PROCEDURE — 72125 CT NECK SPINE W/O DYE: CPT | Mod: 26,MC

## 2024-10-23 PROCEDURE — 70450 CT HEAD/BRAIN W/O DYE: CPT | Mod: 26,MC

## 2024-10-23 RX ORDER — BACITRACIN 500 UNIT/G
1 OINTMENT (GRAM) TOPICAL ONCE
Refills: 0 | Status: DISCONTINUED | OUTPATIENT
Start: 2024-10-23 | End: 2024-10-27

## 2024-10-23 RX ORDER — ACETAMINOPHEN 500 MG/5ML
650 LIQUID (ML) ORAL ONCE
Refills: 0 | Status: COMPLETED | OUTPATIENT
Start: 2024-10-23 | End: 2024-10-23

## 2024-10-23 RX ADMIN — Medication 650 MILLIGRAM(S): at 19:54

## 2024-10-27 ENCOUNTER — EMERGENCY (EMERGENCY)
Facility: HOSPITAL | Age: 84
LOS: 1 days | Discharge: HOME | End: 2024-10-27
Attending: EMERGENCY MEDICINE | Admitting: EMERGENCY MEDICINE
Payer: SELF-PAY

## 2024-10-27 VITALS
HEART RATE: 88 BPM | WEIGHT: 147.93 LBS | OXYGEN SATURATION: 96 % | TEMPERATURE: 98 F | DIASTOLIC BLOOD PRESSURE: 75 MMHG | HEIGHT: 64 IN | SYSTOLIC BLOOD PRESSURE: 142 MMHG | RESPIRATION RATE: 16 BRPM

## 2024-10-27 DIAGNOSIS — Z90.710 ACQUIRED ABSENCE OF BOTH CERVIX AND UTERUS: Chronic | ICD-10-CM

## 2024-10-27 DIAGNOSIS — Z91.018 ALLERGY TO OTHER FOODS: ICD-10-CM

## 2024-10-27 DIAGNOSIS — M79.604 PAIN IN RIGHT LEG: ICD-10-CM

## 2024-10-27 DIAGNOSIS — Z88.1 ALLERGY STATUS TO OTHER ANTIBIOTIC AGENTS: ICD-10-CM
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
## 2024-10-27 DIAGNOSIS — E78.5 HYPERLIPIDEMIA, UNSPECIFIED: ICD-10-CM

## 2024-10-27 DIAGNOSIS — M25.461 EFFUSION, RIGHT KNEE: ICD-10-CM
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
## 2024-10-27 DIAGNOSIS — S80.11XA CONTUSION OF RIGHT LOWER LEG, INITIAL ENCOUNTER: ICD-10-CM

## 2024-10-27 DIAGNOSIS — Z88.0 ALLERGY STATUS TO PENICILLIN: ICD-10-CM

## 2024-10-27 DIAGNOSIS — M25.561 PAIN IN RIGHT KNEE: ICD-10-CM

## 2024-10-27 DIAGNOSIS — I10 ESSENTIAL (PRIMARY) HYPERTENSION: ICD-10-CM

## 2024-10-27 DIAGNOSIS — Z79.82 LONG TERM (CURRENT) USE OF ASPIRIN: ICD-10-CM

## 2024-10-27 DIAGNOSIS — Z88.8 ALLERGY STATUS TO OTHER DRUGS, MEDICAMENTS AND BIOLOGICAL SUBSTANCES: ICD-10-CM

## 2024-10-27 DIAGNOSIS — Z88.2 ALLERGY STATUS TO SULFONAMIDES: ICD-10-CM

## 2024-10-27 PROCEDURE — 93971 EXTREMITY STUDY: CPT | Mod: 26,RT

## 2024-10-27 PROCEDURE — 99284 EMERGENCY DEPT VISIT MOD MDM: CPT

## 2024-10-27 RX ORDER — ACETAMINOPHEN 325 MG
1000 TABLET ORAL ONCE
Refills: 0 | Status: COMPLETED | OUTPATIENT
Start: 2024-10-27 | End: 2024-10-27

## 2024-10-27 RX ADMIN — Medication 1000 MILLIGRAM(S): at 22:07
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
## 2024-10-27 NOTE — ED ADULT NURSE REASSESSMENT NOTE - NS ED NURSE REASSESS COMMENT FT1
Received report from mid shift RN. Pt resting comfortably in chair. Resp even and unlabored. pt to CT. Pending results. Able to ambulate with cane

## 2024-10-27 NOTE — ED ADULT NURSE NOTE - PAIN: RADIATION
VS: VSS.   O2: 99% on room air.    Output: Voiding spontaneously.    Last BM: Today.   Activity: Ambulating with walker.    Up for meals? Encouraged.    Skin: Intact.    Pain: Reports constant RLE pain - PRN dilaudid 2 mg given x1 to assist with pain. Scheduled robaxin also given.    CMS: Intact.    Dressing: None.    Diet: Regular, thin.    LDA: RUE PIV.    Equipment: Walker.    Plan: Continue pain management. Pt states pain management/regimen is improving.    Additional Info:      1600:  - Anticoagulants initiated. - not met.   - Patient or patient's representative demonstrates ability to administer Low Molecular Weight Heparin (LMWH) or home health is arranged. - not met.  - Patient education re: Deep Vein Thrombosis, LMWH, Coumadin. - not met.  - Diagnostic tests and consults completed and resulted - met.  - Vital signs normal or at patient baseline - met.   - Adequate pain control on oral analgesia if ordered - not met, improving.  - Return to baseline functional status - not met.   - Safe disposition plan has been identified - not yet met.     1800:  - Anticoagulants initiated. - met.  - Patient or patient's representative demonstrates ability to administer Low Molecular Weight Heparin (LMWH) or home health is arranged. - not met.  - Patient education re: Deep Vein Thrombosis, LMWH, Coumadin. - not met.   - Diagnostic tests and consults completed and resulted - met.   - Vital signs normal or at patient baseline - met.   - Adequate pain control on oral analgesia if ordered - not met, improving.   - Return to baseline functional status - not met.   - Safe disposition plan has been identified - not yet met.         RIGHT LEG HIP TO SHIN
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
## 2024-10-28 VITALS
DIASTOLIC BLOOD PRESSURE: 76 MMHG | TEMPERATURE: 97 F | RESPIRATION RATE: 18 BRPM | HEART RATE: 68 BPM | OXYGEN SATURATION: 98 % | SYSTOLIC BLOOD PRESSURE: 173 MMHG

## 2024-10-28 PROCEDURE — 99284 EMERGENCY DEPT VISIT MOD MDM: CPT | Mod: 25

## 2024-10-28 PROCEDURE — 73700 CT LOWER EXTREMITY W/O DYE: CPT | Mod: 26,RT,MC

## 2024-10-28 PROCEDURE — 73700 CT LOWER EXTREMITY W/O DYE: CPT | Mod: MC

## 2024-10-28 PROCEDURE — 93971 EXTREMITY STUDY: CPT

## 2024-10-28 RX ORDER — TRAMADOL HYDROCHLORIDE 50 MG/1
50 TABLET, COATED ORAL ONCE
Refills: 0 | Status: DISCONTINUED | OUTPATIENT
Start: 2024-10-28 | End: 2024-10-28

## 2024-10-28 RX ORDER — TRAMADOL HYDROCHLORIDE 50 MG/1
1 TABLET, COATED ORAL
Qty: 10 | Refills: 0
Start: 2024-10-28 | End: 2024-11-01

## 2024-10-28 RX ADMIN — TRAMADOL HYDROCHLORIDE 50 MILLIGRAM(S): 50 TABLET, COATED ORAL at 01:47

## 2024-10-28 RX ADMIN — TRAMADOL HYDROCHLORIDE 50 MILLIGRAM(S): 50 TABLET, COATED ORAL at 04:37
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
## 2024-10-28 NOTE — ED PROVIDER NOTE - PHYSICAL EXAMINATION
RLE - swelling, ecchymosis from just proximal to knee spreading distally. tenderness throughout knee but no focal bony tenderness. able to range knee but w discomfort. tenderness through soft tissue of calf but all compartments soft and easily compressible. ankle / foot / hip w/o bony tenderness, good ROM. no erythema. no skin breaks. 2+ DP pulses, cap refill <2 seconds, sensation intact distally, 5/5 distal strength.    Constitutional : Well appearing, non-toxic, no acute distress. awake, alert, oriented to person, place, time/situation.  Head : head normocephalic, atraumatic  EENMT : eyes clear bilaterally, PERRL, EOMI. airway patent. moist mucous membranes. neck supple.  Cardiac : Normal rate, regular rhythm. No murmur appreciated.  Resp : Breath sounds clear and equal bilaterally. Respirations even and unlabored.   Gastro : abdomen soft, nontender, nondistended. no rebound or guarding.   MSK : extremities otherwise - range of motion is not limited, no muscle or joint tenderness  Back : No evidence of trauma. No spinal tenderness.  Vasc : Extremities warm and well perfused. 2+ radial and DP pulses. cap refill <2 seconds  Neuro : Alert and oriented, CNII-XII grossly intact, no motor or sensory deficits.  Skin : Skin normal color for race, warm, dry and intact. No evidence of rash.  Psych : Alert and oriented to person, place, time/situation. normal mood and affect. no apparent risk to self or others.

## 2024-10-28 NOTE — ED PROVIDER NOTE - PATIENT PORTAL LINK FT
You can access the FollowMyHealth Patient Portal offered by [facility] [place] by registering at the following website: [email or web address]. By joining [facility]’s FollowMyHealth portal, you will also be able to view your health information using other applications (apps) compatible with our system.
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
## 2024-11-17 LAB
25(OH)D3 SERPL-MCNC: 25.8 NG/ML
ALBUMIN SERPL ELPH-MCNC: 4.3 G/DL
ANION GAP SERPL CALC-SCNC: 13 MMOL/L
APPEARANCE: CLEAR
BACTERIA: ABNORMAL /HPF
BILIRUBIN URINE: NEGATIVE
BLOOD URINE: NEGATIVE
BUN SERPL-MCNC: 33 MG/DL
CALCIUM SERPL-MCNC: 10.7 MG/DL
CALCIUM SERPL-MCNC: 10.7 MG/DL
CAST: 3 /LPF
CHLORIDE SERPL-SCNC: 106 MMOL/L
CHOLEST SERPL-MCNC: 155 MG/DL
CO2 SERPL-SCNC: 23 MMOL/L
COLOR: YELLOW
CREAT SERPL-MCNC: 1.6 MG/DL
CREAT SPEC-SCNC: 129 MG/DL
EGFR: 32 ML/MIN/1.73M2
EPITHELIAL CELLS: 1 /HPF
FOLATE SERPL-MCNC: >20 NG/ML
GLUCOSE QUALITATIVE U: NEGATIVE MG/DL
GLUCOSE SERPL-MCNC: 84 MG/DL
HCT VFR BLD CALC: 34.2 %
HDLC SERPL-MCNC: 89 MG/DL
HGB BLD-MCNC: 10.5 G/DL
IRON SATN MFR SERPL: 19 %
IRON SERPL-MCNC: 70 UG/DL
KETONES URINE: NEGATIVE MG/DL
LDLC SERPL CALC-MCNC: 55 MG/DL
LEUKOCYTE ESTERASE URINE: ABNORMAL
MAGNESIUM SERPL-MCNC: 1.9 MG/DL
MCHC RBC-ENTMCNC: 29.9 PG
MCHC RBC-ENTMCNC: 30.7 G/DL
MCV RBC AUTO: 97.4 FL
MICROALBUMIN 24H UR DL<=1MG/L-MCNC: 11.7 MG/DL
MICROALBUMIN/CREAT 24H UR-RTO: 91 MG/G
MICROSCOPIC-UA: NORMAL
NITRITE URINE: POSITIVE
NONHDLC SERPL-MCNC: 66 MG/DL
PARATHYROID HORMONE INTACT: 164 PG/ML
PH URINE: 6
PHOSPHATE SERPL-MCNC: 3 MG/DL
PLATELET # BLD AUTO: 326 K/UL
POTASSIUM SERPL-SCNC: 4.4 MMOL/L
PROTEIN URINE: 30 MG/DL
RBC # BLD: 3.51 M/UL
RBC # FLD: 16.2 %
RED BLOOD CELLS URINE: 0 /HPF
SODIUM SERPL-SCNC: 142 MMOL/L
SPECIFIC GRAVITY URINE: 1.02
TIBC SERPL-MCNC: 368 UG/DL
TRIGL SERPL-MCNC: 50 MG/DL
UIBC SERPL-MCNC: 298 UG/DL
URATE SERPL-MCNC: 8.3 MG/DL
UROBILINOGEN URINE: 0.2 MG/DL
VIT B12 SERPL-MCNC: 564 PG/ML
WBC # FLD AUTO: 6.42 K/UL
WHITE BLOOD CELLS URINE: 4 /HPF

## 2024-11-20 ENCOUNTER — APPOINTMENT (OUTPATIENT)
Dept: NEPHROLOGY | Facility: CLINIC | Age: 84
End: 2024-11-20
Payer: MEDICARE

## 2024-11-20 VITALS — SYSTOLIC BLOOD PRESSURE: 126 MMHG | HEART RATE: 82 BPM | DIASTOLIC BLOOD PRESSURE: 66 MMHG

## 2024-11-20 DIAGNOSIS — N18.4 CHRONIC KIDNEY DISEASE, STAGE 4 (SEVERE): ICD-10-CM

## 2024-11-20 DIAGNOSIS — M10.9 GOUT, UNSPECIFIED: ICD-10-CM

## 2024-11-20 DIAGNOSIS — E21.3 HYPERPARATHYROIDISM, UNSPECIFIED: ICD-10-CM

## 2024-11-20 PROCEDURE — 99214 OFFICE O/P EST MOD 30 MIN: CPT

## 2024-11-20 PROCEDURE — G2211 COMPLEX E/M VISIT ADD ON: CPT

## 2024-11-20 RX ORDER — TRAMADOL HYDROCHLORIDE 50 MG/1
50 TABLET, COATED ORAL
Qty: 14 | Refills: 0 | Status: ACTIVE | COMMUNITY
Start: 2024-11-20 | End: 1900-01-01

## 2024-11-25 ENCOUNTER — APPOINTMENT (OUTPATIENT)
Dept: HEART AND VASCULAR | Facility: CLINIC | Age: 84
End: 2024-11-25
Payer: MEDICARE

## 2024-11-25 VITALS — DIASTOLIC BLOOD PRESSURE: 76 MMHG | SYSTOLIC BLOOD PRESSURE: 145 MMHG

## 2024-11-25 VITALS
SYSTOLIC BLOOD PRESSURE: 166 MMHG | OXYGEN SATURATION: 96 % | BODY MASS INDEX: 25.78 KG/M2 | HEART RATE: 79 BPM | HEIGHT: 64 IN | TEMPERATURE: 97.7 F | WEIGHT: 151 LBS | DIASTOLIC BLOOD PRESSURE: 73 MMHG

## 2024-11-25 DIAGNOSIS — R60.0 LOCALIZED EDEMA: ICD-10-CM

## 2024-11-25 DIAGNOSIS — E78.5 HYPERLIPIDEMIA, UNSPECIFIED: ICD-10-CM

## 2024-11-25 DIAGNOSIS — I25.10 ATHEROSCLEROTIC HEART DISEASE OF NATIVE CORONARY ARTERY W/OUT ANGINA PECTORIS: ICD-10-CM

## 2024-11-25 PROCEDURE — 99214 OFFICE O/P EST MOD 30 MIN: CPT

## 2024-11-25 PROCEDURE — 93000 ELECTROCARDIOGRAM COMPLETE: CPT

## 2024-11-25 PROCEDURE — G2211 COMPLEX E/M VISIT ADD ON: CPT

## 2024-11-25 RX ORDER — FUROSEMIDE 20 MG/1
20 TABLET ORAL
Qty: 90 | Refills: 3 | Status: ACTIVE | COMMUNITY
Start: 2024-11-25 | End: 1900-01-01

## 2024-12-02 ENCOUNTER — APPOINTMENT (OUTPATIENT)
Dept: INTERNAL MEDICINE | Facility: CLINIC | Age: 84
End: 2024-12-02
Payer: MEDICARE

## 2024-12-02 VITALS
TEMPERATURE: 97.7 F | SYSTOLIC BLOOD PRESSURE: 150 MMHG | BODY MASS INDEX: 24.59 KG/M2 | HEIGHT: 64 IN | OXYGEN SATURATION: 99 % | WEIGHT: 144.06 LBS | DIASTOLIC BLOOD PRESSURE: 70 MMHG | HEART RATE: 73 BPM

## 2024-12-02 DIAGNOSIS — N30.00 ACUTE CYSTITIS W/OUT HEMATURIA: ICD-10-CM

## 2024-12-02 DIAGNOSIS — D63.1 CHRONIC KIDNEY DISEASE, UNSPECIFIED: ICD-10-CM

## 2024-12-02 DIAGNOSIS — I10 ESSENTIAL (PRIMARY) HYPERTENSION: ICD-10-CM

## 2024-12-02 DIAGNOSIS — N18.9 CHRONIC KIDNEY DISEASE, UNSPECIFIED: ICD-10-CM

## 2024-12-02 DIAGNOSIS — R53.83 OTHER FATIGUE: ICD-10-CM

## 2024-12-02 PROCEDURE — 99213 OFFICE O/P EST LOW 20 MIN: CPT | Mod: 25

## 2024-12-02 PROCEDURE — 96372 THER/PROPH/DIAG INJ SC/IM: CPT

## 2024-12-02 PROCEDURE — G2211 COMPLEX E/M VISIT ADD ON: CPT

## 2024-12-02 RX ORDER — CYANOCOBALAMIN 1000 UG/ML
1000 INJECTION, SOLUTION INTRAMUSCULAR; SUBCUTANEOUS
Qty: 0 | Refills: 0 | Status: COMPLETED | OUTPATIENT
Start: 2024-12-02

## 2024-12-02 RX ADMIN — CYANOCOBALAMIN 0 MCG/ML: 1000 INJECTION, SOLUTION INTRAMUSCULAR; SUBCUTANEOUS at 00:00

## 2025-01-02 ENCOUNTER — APPOINTMENT (OUTPATIENT)
Dept: HEART AND VASCULAR | Facility: CLINIC | Age: 85
End: 2025-01-02
Payer: MEDICARE

## 2025-01-02 VITALS
DIASTOLIC BLOOD PRESSURE: 83 MMHG | OXYGEN SATURATION: 96 % | TEMPERATURE: 97.7 F | WEIGHT: 151 LBS | SYSTOLIC BLOOD PRESSURE: 152 MMHG | BODY MASS INDEX: 25.78 KG/M2 | HEART RATE: 82 BPM | HEIGHT: 64 IN

## 2025-01-02 VITALS — SYSTOLIC BLOOD PRESSURE: 143 MMHG | DIASTOLIC BLOOD PRESSURE: 80 MMHG

## 2025-01-02 DIAGNOSIS — I83.893 VARICOSE VEINS OF BILATERAL LOWER EXTREMITIES WITH OTHER COMPLICATIONS: ICD-10-CM

## 2025-01-02 DIAGNOSIS — C45.7: ICD-10-CM

## 2025-01-02 DIAGNOSIS — M17.10 UNILATERAL PRIMARY OSTEOARTHRITIS, UNSPECIFIED KNEE: ICD-10-CM

## 2025-01-02 DIAGNOSIS — N18.4 CHRONIC KIDNEY DISEASE, STAGE 4 (SEVERE): ICD-10-CM

## 2025-01-02 DIAGNOSIS — L81.0 POSTINFLAMMATORY HYPERPIGMENTATION: ICD-10-CM

## 2025-01-02 DIAGNOSIS — M79.89 OTHER SPECIFIED SOFT TISSUE DISORDERS: ICD-10-CM

## 2025-01-02 DIAGNOSIS — M79.604 PAIN IN RIGHT LEG: ICD-10-CM

## 2025-01-02 DIAGNOSIS — E78.5 HYPERLIPIDEMIA, UNSPECIFIED: ICD-10-CM

## 2025-01-02 DIAGNOSIS — R60.0 LOCALIZED EDEMA: ICD-10-CM

## 2025-01-02 DIAGNOSIS — I10 ESSENTIAL (PRIMARY) HYPERTENSION: ICD-10-CM

## 2025-01-02 DIAGNOSIS — I27.20 PULMONARY HYPERTENSION, UNSPECIFIED: ICD-10-CM

## 2025-01-02 PROCEDURE — 93970 EXTREMITY STUDY: CPT

## 2025-01-02 PROCEDURE — 99215 OFFICE O/P EST HI 40 MIN: CPT | Mod: 25

## 2025-01-08 ENCOUNTER — APPOINTMENT (OUTPATIENT)
Dept: HEMATOLOGY ONCOLOGY | Facility: CLINIC | Age: 85
End: 2025-01-08

## 2025-01-08 ENCOUNTER — NON-APPOINTMENT (OUTPATIENT)
Age: 85
End: 2025-01-08

## 2025-01-08 VITALS
SYSTOLIC BLOOD PRESSURE: 138 MMHG | DIASTOLIC BLOOD PRESSURE: 84 MMHG | TEMPERATURE: 97.3 F | HEART RATE: 63 BPM | OXYGEN SATURATION: 99 % | BODY MASS INDEX: 24.92 KG/M2 | WEIGHT: 146 LBS | HEIGHT: 64 IN

## 2025-01-08 DIAGNOSIS — R74.8 ABNORMAL LEVELS OF OTHER SERUM ENZYMES: ICD-10-CM

## 2025-01-08 DIAGNOSIS — D63.1 CHRONIC KIDNEY DISEASE, UNSPECIFIED: ICD-10-CM

## 2025-01-08 DIAGNOSIS — R53.83 OTHER FATIGUE: ICD-10-CM

## 2025-01-08 DIAGNOSIS — R79.89 OTHER SPECIFIED ABNORMAL FINDINGS OF BLOOD CHEMISTRY: ICD-10-CM

## 2025-01-08 DIAGNOSIS — E53.8 DEFICIENCY OF OTHER SPECIFIED B GROUP VITAMINS: ICD-10-CM

## 2025-01-08 DIAGNOSIS — N18.9 CHRONIC KIDNEY DISEASE, UNSPECIFIED: ICD-10-CM

## 2025-01-08 PROCEDURE — 36415 COLL VENOUS BLD VENIPUNCTURE: CPT

## 2025-01-08 PROCEDURE — 99204 OFFICE O/P NEW MOD 45 MIN: CPT | Mod: 25

## 2025-01-08 PROCEDURE — 96372 THER/PROPH/DIAG INJ SC/IM: CPT

## 2025-01-08 RX ORDER — CYANOCOBALAMIN 1000 UG/ML
1000 INJECTION, SOLUTION INTRAMUSCULAR; SUBCUTANEOUS
Qty: 1 | Refills: 0 | Status: COMPLETED | OUTPATIENT
Start: 2025-01-08

## 2025-01-08 RX ADMIN — CYANOCOBALAMIN 0 MCG/ML: 1000 INJECTION, SOLUTION INTRAMUSCULAR at 00:00

## 2025-01-09 LAB
ACANTHOCYTES BLD QL SMEAR: SLIGHT
ALBUMIN SERPL ELPH-MCNC: 4.3 G/DL
ALP BLD-CCNC: 81 U/L
ALT SERPL-CCNC: 8 U/L
ANION GAP SERPL CALC-SCNC: 12 MMOL/L
AST SERPL-CCNC: 19 U/L
BASOPHILS # BLD AUTO: 0.03 K/UL
BASOPHILS # BLD AUTO: 0.03 K/UL
BASOPHILS NFR BLD AUTO: 0.3 %
BASOPHILS NFR BLD AUTO: 0.3 %
BILIRUB SERPL-MCNC: 0.3 MG/DL
BUN SERPL-MCNC: 30 MG/DL
CALCIUM SERPL-MCNC: 10.8 MG/DL
CHLORIDE SERPL-SCNC: 106 MMOL/L
CO2 SERPL-SCNC: 24 MMOL/L
CREAT SERPL-MCNC: 1.56 MG/DL
EGFR: 33 ML/MIN/1.73M2
EOSINOPHIL # BLD AUTO: 0.18 K/UL
EOSINOPHIL # BLD AUTO: 0.18 K/UL
EOSINOPHIL NFR BLD AUTO: 2.1 %
EOSINOPHIL NFR BLD AUTO: 2.1 %
ERYTHROCYTE [SEDIMENTATION RATE] IN BLOOD BY WESTERGREN METHOD: 19 MM/HR
FERRITIN SERPL-MCNC: 36 NG/ML
FOLATE SERPL-MCNC: >20 NG/ML
GLUCOSE SERPL-MCNC: 85 MG/DL
HAPTOGLOB SERPL-MCNC: 112 MG/DL
HCT VFR BLD CALC: 33.5 %
HCYS SERPL-MCNC: 19.6 UMOL/L
HGB BLD-MCNC: 10.3 G/DL
HYPOCHROMIA BLD QL SMEAR: SLIGHT
IMM GRANULOCYTES NFR BLD AUTO: 1 %
IRON SATN MFR SERPL: 19 %
IRON SERPL-MCNC: 68 UG/DL
LDH SERPL-CCNC: 178 U/L
LYMPHOCYTES # BLD AUTO: 1.56 K/UL
LYMPHOCYTES # BLD AUTO: 1.56 K/UL
LYMPHOCYTES NFR BLD AUTO: 18 %
LYMPHOCYTES NFR BLD AUTO: 18 %
MAN DIFF?: NORMAL
MCHC RBC-ENTMCNC: 29.3 PG
MCHC RBC-ENTMCNC: 30.7 G/DL
MCV RBC AUTO: 95.4 FL
MONOCYTES # BLD AUTO: 1.18 K/UL
MONOCYTES # BLD AUTO: 1.18 K/UL
MONOCYTES NFR BLD AUTO: 13.6 %
MONOCYTES NFR BLD AUTO: 13.6 %
NEUTROPHILS # BLD AUTO: 5.63 K/UL
NEUTROPHILS # BLD AUTO: 5.63 K/UL
NEUTROPHILS NFR BLD AUTO: 65 %
NEUTROPHILS NFR BLD AUTO: 65 %
OVALOCYTES BLD QL SMEAR: SLIGHT
PLAT MORPH BLD: ABNORMAL
PLATELET # BLD AUTO: 244 K/UL
PMV BLD AUTO: 0 /100 WBCS
POIKILOCYTOSIS BLD QL SMEAR: SLIGHT
POLYCHROMASIA BLD QL SMEAR: SLIGHT
POTASSIUM SERPL-SCNC: 4.5 MMOL/L
PROT SERPL-MCNC: 6.8 G/DL
RBC # BLD: 3.51 M/UL
RBC # BLD: 3.51 M/UL
RBC # FLD: 15 %
RBC BLD AUTO: ABNORMAL
RETICS # AUTO: 0.7 %
RETICS AGGREG/RBC NFR: 23.2 K/UL
SCHISTOCYTES BLD QL SMEAR: SLIGHT
SODIUM SERPL-SCNC: 142 MMOL/L
TIBC SERPL-MCNC: 370 UG/DL
UIBC SERPL-MCNC: 301 UG/DL
VIT B12 SERPL-MCNC: 540 PG/ML
WBC # FLD AUTO: 8.67 K/UL

## 2025-01-10 ENCOUNTER — EMERGENCY (EMERGENCY)
Facility: HOSPITAL | Age: 85
LOS: 1 days | Discharge: HOME | End: 2025-01-10
Admitting: EMERGENCY MEDICINE
Payer: MEDICARE

## 2025-01-10 VITALS
OXYGEN SATURATION: 99 % | RESPIRATION RATE: 20 BRPM | DIASTOLIC BLOOD PRESSURE: 71 MMHG | HEART RATE: 108 BPM | TEMPERATURE: 98 F | SYSTOLIC BLOOD PRESSURE: 148 MMHG

## 2025-01-10 DIAGNOSIS — Z90.710 ACQUIRED ABSENCE OF BOTH CERVIX AND UTERUS: Chronic | ICD-10-CM

## 2025-01-10 LAB — TRYPTASE: 26 UG/L

## 2025-01-10 PROCEDURE — 99283 EMERGENCY DEPT VISIT LOW MDM: CPT | Mod: 25

## 2025-01-10 PROCEDURE — 73564 X-RAY EXAM KNEE 4 OR MORE: CPT | Mod: 26,LT

## 2025-01-10 PROCEDURE — 73564 X-RAY EXAM KNEE 4 OR MORE: CPT

## 2025-01-10 PROCEDURE — 99283 EMERGENCY DEPT VISIT LOW MDM: CPT

## 2025-01-10 RX ORDER — ACETAMINOPHEN 80 MG/.8ML
650 SOLUTION/ DROPS ORAL ONCE
Refills: 0 | Status: COMPLETED | OUTPATIENT
Start: 2025-01-10 | End: 2025-01-10

## 2025-01-10 NOTE — ED PROVIDER NOTE - MUSCULOSKELETAL [+], MLM
left knee  Yeah yeah just a knee immobilizer I did not know what to put in for that so I I put the in the notes not/JOINT PAIN
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
## 2025-01-10 NOTE — ED ADULT NURSE NOTE - NSFALLRISKINTERV_ED_ALL_ED
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
## 2025-01-10 NOTE — ED PROVIDER NOTE - NSFOLLOWUPINSTRUCTIONS_ED_ALL_ED_FT
Knee Immobilizer    WHAT YOU NEED TO KNOW:    How might a knee immobilizer help me? A knee immobilizer limits knee movement. It is used after an injury or surgery to help your knee, muscles, or tendons heal.    How do I safely use a knee immobilizer?    Have your knee immobilizer fitted by your healthcare provider. It is important that your knee immobilizer is the right size for you and that it fits properly.    Wear your knee immobilizer as directed. It can be worn over your clothing. Check the fit of the knee immobilizer often. If it does not fit properly or slips out of place, it could cause further injury.    Use crutches as directed. You may need to avoid putting weight on your injured leg. Your healthcare provider will tell you if you need crutches and for how long.    Inspect your knee immobilizer often. Do not wear your knee immobilizer if it is damaged or broken. You may need to replace it if it becomes worn.    Ask your healthcare provider how to care for your knee immobilizer. You may be able to hand wash the fabric with mild soap and water. Do not place it in the washer or dryer.    Go to physical therapy as directed. A physical therapist can help you strengthen the muscles in your leg and help your knee heal.  When should I contact my healthcare provider?    Your knee pain becomes worse when you wear your knee immobilizer.    Your skin is sore or raw after you wear your knee immobilizer.    Your leg feels numb or swells while you wear your knee immobilizer.    Your knee immobilizer is damaged.    You have questions or concerns about your condition or care.  When should I seek immediate care or call 911?    You have severe swelling or pain in your leg or knee.    CARE AGREEMENT:    You have the right to help plan your care. Learn about your health condition and how it may be treated. Discuss treatment options with your healthcare providers to decide what care you want to receive. You always have the right to refuse treatment.    © [employer] 1973, 2025
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
## 2025-01-10 NOTE — ED ADULT NURSE NOTE - NSHOSCREENINGQ1_ED_ALL_ED
Group Topic:  Activity Group    Date: 11/8/2023  Start Time:  2:00 PM  End Time:  2:50 PM  Facilitators: Erasto Middleton LCSW    Focus: Perfectionism (Problem Solving Skills)  Number in attendance: 8    Continued presentation on the topic of perfectionism. Discussion centered around tips for overcoming perfectionism. Method: Group   Attendance: Present  Participation: Minimal  Patient Response: Attentive, Good eye contact and Quiet    Pt appeared attentive throughout the activity group. Pt was quiet but was observed maintaining good eye contact.      Ema Sullivan LCSW No

## 2025-01-10 NOTE — ED PROVIDER NOTE - PATIENT PORTAL LINK FT
You can access the FollowMyHealth Patient Portal offered by [facility] by registering at the following website: [email or web address]. By joining [facility]’s FollowMyHealth portal, you will also be able to view your health information using other applications (apps) compatible with our system.
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
## 2025-01-12 LAB
ALBUMIN MFR SERPL ELPH: 60 %
ALBUMIN SERPL-MCNC: 4 G/DL
ALBUMIN/GLOB SERPL: 1.5 RATIO
ALPHA1 GLOB MFR SERPL ELPH: 4.8 %
ALPHA1 GLOB SERPL ELPH-MCNC: 0.3 G/DL
ALPHA2 GLOB MFR SERPL ELPH: 9.2 %
ALPHA2 GLOB SERPL ELPH-MCNC: 0.6 G/DL
B-GLOBULIN MFR SERPL ELPH: 11.4 %
B-GLOBULIN SERPL ELPH-MCNC: 0.8 G/DL
DEPRECATED KAPPA LC FREE/LAMBDA SER: 1.74 RATIO
GAMMA GLOB FLD ELPH-MCNC: 1 G/DL
GAMMA GLOB MFR SERPL ELPH: 14.6 %
IGA SER QL IEP: 120 MG/DL
IGG SER QL IEP: 1002 MG/DL
IGM SER QL IEP: 71 MG/DL
INTERPRETATION SERPL IEP-IMP: NORMAL
KAPPA LC CSF-MCNC: 2.31 MG/DL
KAPPA LC SERPL-MCNC: 4.02 MG/DL
M PROTEIN SPEC IFE-MCNC: NORMAL
PROT SERPL-MCNC: 6.7 G/DL
PROT SERPL-MCNC: 6.7 G/DL

## 2025-01-14 ENCOUNTER — NON-APPOINTMENT (OUTPATIENT)
Age: 85
End: 2025-01-14

## 2025-01-31 NOTE — ED ADULT NURSE NOTE - BIRTH SEX
Acute hypoxic respiratory failure s/p Tracheostomy   Trach collar around the clock since 1/22   Transitioned to 6.0 cuffless on 1/29  Nebs daily Female

## 2025-02-11 ENCOUNTER — OFFICE (OUTPATIENT)
Dept: URBAN - METROPOLITAN AREA CLINIC 28 | Facility: CLINIC | Age: 85
Setting detail: OPHTHALMOLOGY
End: 2025-02-11
Payer: MEDICARE

## 2025-02-11 DIAGNOSIS — H01.004: ICD-10-CM

## 2025-02-11 DIAGNOSIS — H16.223: ICD-10-CM

## 2025-02-11 DIAGNOSIS — H47.233: ICD-10-CM

## 2025-02-11 DIAGNOSIS — H25.13: ICD-10-CM

## 2025-02-11 DIAGNOSIS — H01.001: ICD-10-CM

## 2025-02-11 DIAGNOSIS — H02.403: ICD-10-CM

## 2025-02-11 DIAGNOSIS — H01.005: ICD-10-CM

## 2025-02-11 DIAGNOSIS — H40.013: ICD-10-CM

## 2025-02-11 DIAGNOSIS — H40.033: ICD-10-CM

## 2025-02-11 DIAGNOSIS — H01.002: ICD-10-CM

## 2025-02-11 DIAGNOSIS — H02.131: ICD-10-CM

## 2025-02-11 DIAGNOSIS — H40.063: ICD-10-CM

## 2025-02-11 PROCEDURE — 92014 COMPRE OPH EXAM EST PT 1/>: CPT | Performed by: OPHTHALMOLOGY

## 2025-02-11 ASSESSMENT — REFRACTION_CURRENTRX
OS_AXIS: 180
OS_SPHERE: +1.50
OS_CYLINDER: -0.50
OD_AXIS: 180
OS_VPRISM_DIRECTION: SV
OS_AXIS: 180
OS_SPHERE: +1.75
OD_SPHERE: +1.50
OS_OVR_VA: 20/
OS_AXIS: 066
OS_CYLINDER: 0.00
OS_SPHERE: +4.25
OD_OVR_VA: 20/
OD_AXIS: 180
OD_SPHERE: +1.00
OD_CYLINDER: 0.00
OS_OVR_VA: 20/
OS_VPRISM_DIRECTION: SV
OS_CYLINDER: 0.00
OD_SPHERE: +5.00
OD_AXIS: 180
OD_VPRISM_DIRECTION: SV
OD_CYLINDER: 0.00
OD_SPHERE: +2.50
OS_CYLINDER: -0.50
OD_VPRISM_DIRECTION: SV
OS_AXIS: 180
OS_SPHERE: +1.25
OS_AXIS: 072
OS_VPRISM_DIRECTION: SV
OD_CYLINDER: -0.50
OS_SPHERE: +3.50
OD_OVR_VA: 20/
OS_OVR_VA: 20/
OD_OVR_VA: 20/
OD_VPRISM_DIRECTION: SV
OD_AXIS: 180
OS_VPRISM_DIRECTION: SV
OD_AXIS: 147
OD_CYLINDER: 0.00
OS_AXIS: 180
OD_CYLINDER: 0.00
OD_AXIS: 180
OS_CYLINDER: 0.00
OD_CYLINDER: 0.00
OD_SPHERE: +3.50
OD_SPHERE: +5.00
OS_CYLINDER: 0.00
OD_VPRISM_DIRECTION: SV
OS_SPHERE: +4.50

## 2025-02-11 ASSESSMENT — REFRACTION_AUTOREFRACTION
OD_AXIS: 006
OS_SPHERE: +2.25
OD_SPHERE: +2.75
OS_AXIS: 075
OD_CYLINDER: -0.25
OS_CYLINDER: -0.75

## 2025-02-11 ASSESSMENT — LID POSITION - PTOSIS
OD_PTOSIS: RUL 2+ 3+
OS_PTOSIS: LUL 1+ 2+

## 2025-02-11 ASSESSMENT — KERATOMETRY
OD_K2POWER_DIOPTERS: 45.25
OD_AXISANGLE_DEGREES: 092
OS_AXISANGLE_DEGREES: 090
METHOD_AUTO_MANUAL: AUTO
OD_K1POWER_DIOPTERS: 44.00
OS_K2POWER_DIOPTERS: 44.00
OS_K1POWER_DIOPTERS: 44.00

## 2025-02-11 ASSESSMENT — REFRACTION_MANIFEST
OS_SPHERE: +1.75
OD_SPHERE: +2.50
OD_ADD: +2.50
OS_ADD: +2.50

## 2025-02-11 ASSESSMENT — TONOMETRY
OD_IOP_MMHG: 20
OS_IOP_MMHG: 20

## 2025-02-11 ASSESSMENT — LID EXAM ASSESSMENTS
OS_BLEPHARITIS: LLL LUL T
OS_COMMENTS: 1+ 2+ ECTROPIO
OD_BLEPHARITIS: RLL RUL T
OD_COMMENTS: 1+ 2+ ECTROPIO

## 2025-02-11 ASSESSMENT — VISUAL ACUITY
OS_BCVA: 20/25-2
OD_BCVA: 20/20-1

## 2025-02-11 ASSESSMENT — PACHYMETRY
OD_CT_UM: 576
OD_CT_CORRECTION: -2
OS_CT_CORRECTION: -3
OS_CT_UM: 583

## 2025-02-11 ASSESSMENT — TEAR BREAK UP TIME (TBUT)
OS_TBUT: 2+
OD_TBUT: 2+

## 2025-02-11 ASSESSMENT — CONFRONTATIONAL VISUAL FIELD TEST (CVF)
OS_FINDINGS: FULL
OD_FINDINGS: FULL

## 2025-02-24 ENCOUNTER — APPOINTMENT (OUTPATIENT)
Dept: INTERNAL MEDICINE | Facility: CLINIC | Age: 85
End: 2025-02-24
Payer: MEDICARE

## 2025-02-24 VITALS
DIASTOLIC BLOOD PRESSURE: 70 MMHG | TEMPERATURE: 98.2 F | RESPIRATION RATE: 17 BRPM | WEIGHT: 146 LBS | HEIGHT: 64 IN | SYSTOLIC BLOOD PRESSURE: 121 MMHG | OXYGEN SATURATION: 96 % | HEART RATE: 104 BPM | BODY MASS INDEX: 24.92 KG/M2

## 2025-02-24 DIAGNOSIS — S89.92XS UNSPECIFIED INJURY OF LEFT LOWER LEG, SEQUELA: ICD-10-CM

## 2025-02-24 DIAGNOSIS — U07.1 COVID-19: ICD-10-CM

## 2025-02-24 DIAGNOSIS — E53.8 DEFICIENCY OF OTHER SPECIFIED B GROUP VITAMINS: ICD-10-CM

## 2025-02-24 PROCEDURE — 99213 OFFICE O/P EST LOW 20 MIN: CPT | Mod: 25

## 2025-02-24 PROCEDURE — 96372 THER/PROPH/DIAG INJ SC/IM: CPT

## 2025-02-24 RX ORDER — CYANOCOBALAMIN 1000 UG/ML
1000 INJECTION, SOLUTION INTRAMUSCULAR; SUBCUTANEOUS
Qty: 0 | Refills: 0 | Status: COMPLETED | OUTPATIENT
Start: 2025-02-24

## 2025-02-24 RX ADMIN — CYANOCOBALAMIN 0 MCG/ML: 1000 INJECTION, SOLUTION INTRAMUSCULAR; SUBCUTANEOUS at 00:00

## 2025-03-19 LAB
25(OH)D3 SERPL-MCNC: 26.5 NG/ML
ALBUMIN SERPL ELPH-MCNC: 4.3 G/DL
ALP BLD-CCNC: 82 U/L
ALT SERPL-CCNC: 5 U/L
ANION GAP SERPL CALC-SCNC: 12 MMOL/L
APPEARANCE: CLEAR
AST SERPL-CCNC: 14 U/L
BACTERIA: ABNORMAL /HPF
BILIRUB SERPL-MCNC: 0.2 MG/DL
BILIRUBIN URINE: NEGATIVE
BLOOD URINE: NEGATIVE
BUN SERPL-MCNC: 28 MG/DL
CALCIUM SERPL-MCNC: 11.4 MG/DL
CALCIUM SERPL-MCNC: 11.4 MG/DL
CAST: 16 /LPF
CHLORIDE SERPL-SCNC: 104 MMOL/L
CHOLEST SERPL-MCNC: 141 MG/DL
CO2 SERPL-SCNC: 24 MMOL/L
COLOR: YELLOW
CREAT SERPL-MCNC: 1.57 MG/DL
CREAT SPEC-SCNC: 184 MG/DL
EGFRCR SERPLBLD CKD-EPI 2021: 33 ML/MIN/1.73M2
EPITHELIAL CELLS: 6 /HPF
ESTIMATED AVERAGE GLUCOSE: 126 MG/DL
FOLATE SERPL-MCNC: >20 NG/ML
GLUCOSE QUALITATIVE U: NEGATIVE MG/DL
GLUCOSE SERPL-MCNC: 101 MG/DL
HBA1C MFR BLD HPLC: 6 %
HCT VFR BLD CALC: 34.2 %
HDLC SERPL-MCNC: 71 MG/DL
HGB BLD-MCNC: 10.8 G/DL
HYALINE CASTS: PRESENT
IRON SATN MFR SERPL: 15 %
IRON SERPL-MCNC: 48 UG/DL
KETONES URINE: NEGATIVE MG/DL
LDLC SERPL CALC-MCNC: 56 MG/DL
LEUKOCYTE ESTERASE URINE: ABNORMAL
MAGNESIUM SERPL-MCNC: 2 MG/DL
MCHC RBC-ENTMCNC: 30.3 PG
MCHC RBC-ENTMCNC: 31.6 G/DL
MCV RBC AUTO: 95.8 FL
MICROALBUMIN 24H UR DL<=1MG/L-MCNC: 15.9 MG/DL
MICROALBUMIN/CREAT 24H UR-RTO: 86 MG/G
MICROSCOPIC-UA: NORMAL
NITRITE URINE: POSITIVE
NONHDLC SERPL-MCNC: 71 MG/DL
NT-PROBNP SERPL-MCNC: 505 PG/ML
PARATHYROID HORMONE INTACT: 121 PG/ML
PH URINE: 5
PHOSPHATE SERPL-MCNC: 3 MG/DL
PLATELET # BLD AUTO: 349 K/UL
POTASSIUM SERPL-SCNC: 4.4 MMOL/L
PROT SERPL-MCNC: 7.1 G/DL
PROTEIN URINE: 30 MG/DL
RBC # BLD: 3.57 M/UL
RBC # FLD: 15.5 %
RED BLOOD CELLS URINE: 0 /HPF
REVIEW: NORMAL
SODIUM SERPL-SCNC: 140 MMOL/L
SPECIFIC GRAVITY URINE: 1.02
TIBC SERPL-MCNC: 324 UG/DL
TRIGL SERPL-MCNC: 75 MG/DL
TSH SERPL-ACNC: 0.61 UIU/ML
UIBC SERPL-MCNC: 276 UG/DL
URATE SERPL-MCNC: 9.2 MG/DL
UROBILINOGEN URINE: 0.2 MG/DL
VIT B12 SERPL-MCNC: 768 PG/ML
WBC # FLD AUTO: 8.76 K/UL
WHITE BLOOD CELLS URINE: 33 /HPF

## 2025-03-24 ENCOUNTER — APPOINTMENT (OUTPATIENT)
Dept: NEPHROLOGY | Facility: CLINIC | Age: 85
End: 2025-03-24
Payer: MEDICARE

## 2025-03-24 VITALS — DIASTOLIC BLOOD PRESSURE: 68 MMHG | SYSTOLIC BLOOD PRESSURE: 140 MMHG | HEART RATE: 74 BPM

## 2025-03-24 DIAGNOSIS — R80.9 PROTEINURIA, UNSPECIFIED: ICD-10-CM

## 2025-03-24 DIAGNOSIS — E78.00 PURE HYPERCHOLESTEROLEMIA, UNSPECIFIED: ICD-10-CM

## 2025-03-24 DIAGNOSIS — E78.5 HYPERLIPIDEMIA, UNSPECIFIED: ICD-10-CM

## 2025-03-24 DIAGNOSIS — E21.3 HYPERPARATHYROIDISM, UNSPECIFIED: ICD-10-CM

## 2025-03-24 DIAGNOSIS — I10 ESSENTIAL (PRIMARY) HYPERTENSION: ICD-10-CM

## 2025-03-24 DIAGNOSIS — M10.9 GOUT, UNSPECIFIED: ICD-10-CM

## 2025-03-24 DIAGNOSIS — N18.4 CHRONIC KIDNEY DISEASE, STAGE 4 (SEVERE): ICD-10-CM

## 2025-03-24 PROCEDURE — 99214 OFFICE O/P EST MOD 30 MIN: CPT

## 2025-03-24 PROCEDURE — G2211 COMPLEX E/M VISIT ADD ON: CPT

## 2025-04-01 ENCOUNTER — APPOINTMENT (OUTPATIENT)
Dept: OBGYN | Facility: CLINIC | Age: 85
End: 2025-04-01
Payer: MEDICARE

## 2025-04-01 VITALS
SYSTOLIC BLOOD PRESSURE: 151 MMHG | WEIGHT: 146 LBS | DIASTOLIC BLOOD PRESSURE: 80 MMHG | OXYGEN SATURATION: 97 % | HEART RATE: 80 BPM | BODY MASS INDEX: 25.06 KG/M2

## 2025-04-01 DIAGNOSIS — Z01.419 ENCOUNTER FOR GYNECOLOGICAL EXAMINATION (GENERAL) (ROUTINE) W/OUT ABNORMAL FINDINGS: ICD-10-CM

## 2025-04-01 PROCEDURE — G0101: CPT

## 2025-04-07 LAB — CYTOLOGY CVX/VAG DOC THIN PREP: ABNORMAL

## 2025-04-08 ENCOUNTER — APPOINTMENT (OUTPATIENT)
Dept: INTERNAL MEDICINE | Facility: CLINIC | Age: 85
End: 2025-04-08
Payer: MEDICARE

## 2025-04-08 VITALS — SYSTOLIC BLOOD PRESSURE: 117 MMHG | DIASTOLIC BLOOD PRESSURE: 67 MMHG

## 2025-04-08 VITALS
HEART RATE: 80 BPM | HEIGHT: 64 IN | SYSTOLIC BLOOD PRESSURE: 150 MMHG | WEIGHT: 144.06 LBS | BODY MASS INDEX: 24.59 KG/M2 | DIASTOLIC BLOOD PRESSURE: 69 MMHG | TEMPERATURE: 98.5 F | OXYGEN SATURATION: 95 %

## 2025-04-08 DIAGNOSIS — J30.9 ALLERGIC RHINITIS, UNSPECIFIED: ICD-10-CM

## 2025-04-08 DIAGNOSIS — J34.89 OTHER SPECIFIED DISORDERS OF NOSE AND NASAL SINUSES: ICD-10-CM

## 2025-04-08 DIAGNOSIS — R82.90 UNSPECIFIED ABNORMAL FINDINGS IN URINE: ICD-10-CM

## 2025-04-08 DIAGNOSIS — Z23 ENCOUNTER FOR IMMUNIZATION: ICD-10-CM

## 2025-04-08 PROCEDURE — G2211 COMPLEX E/M VISIT ADD ON: CPT

## 2025-04-08 PROCEDURE — 99213 OFFICE O/P EST LOW 20 MIN: CPT

## 2025-04-08 RX ORDER — FEXOFENADINE HCL 60 MG/1
60 TABLET, FILM COATED ORAL
Qty: 60 | Refills: 3 | Status: ACTIVE | COMMUNITY
Start: 2025-04-08 | End: 1900-01-01

## 2025-04-08 RX ORDER — FLUTICASONE PROPIONATE 50 UG/1
50 SPRAY, METERED NASAL
Refills: 0 | Status: ACTIVE | COMMUNITY
Start: 2025-04-08

## 2025-04-09 LAB
INFLUENZA A RESULT: NOT DETECTED
INFLUENZA B RESULT: NOT DETECTED
RESP SYN VIRUS RESULT: NOT DETECTED
SARS-COV-2 RESULT: NOT DETECTED

## 2025-04-12 PROBLEM — Z23 ENCOUNTER FOR IMMUNIZATION: Status: RESOLVED | Noted: 2022-08-09 | Resolved: 2025-04-12

## 2025-04-12 PROBLEM — R82.90 CLOUDY URINE: Status: RESOLVED | Noted: 2024-09-03 | Resolved: 2025-04-12

## 2025-04-12 PROBLEM — J30.9 ALLERGIC SINUSITIS: Status: ACTIVE | Noted: 2025-04-08

## 2025-04-12 PROBLEM — J34.89 SINUS PRESSURE: Status: ACTIVE | Noted: 2025-04-08

## 2025-04-18 RX ORDER — CINACALCET 30 MG/1
30 TABLET ORAL
Qty: 90 | Refills: 3 | Status: ACTIVE | COMMUNITY
Start: 2025-04-18 | End: 1900-01-01

## 2025-05-06 ENCOUNTER — TRANSCRIPTION ENCOUNTER (OUTPATIENT)
Age: 85
End: 2025-05-06

## 2025-05-19 ENCOUNTER — NON-APPOINTMENT (OUTPATIENT)
Age: 85
End: 2025-05-19

## 2025-05-19 ENCOUNTER — APPOINTMENT (OUTPATIENT)
Dept: HEART AND VASCULAR | Facility: CLINIC | Age: 85
End: 2025-05-19
Payer: MEDICARE

## 2025-05-19 VITALS
TEMPERATURE: 97.5 F | HEART RATE: 72 BPM | BODY MASS INDEX: 23.56 KG/M2 | DIASTOLIC BLOOD PRESSURE: 72 MMHG | HEIGHT: 64 IN | OXYGEN SATURATION: 96 % | WEIGHT: 138 LBS | SYSTOLIC BLOOD PRESSURE: 129 MMHG

## 2025-05-19 DIAGNOSIS — I77.810 THORACIC AORTIC ECTASIA: ICD-10-CM

## 2025-05-19 DIAGNOSIS — I10 ESSENTIAL (PRIMARY) HYPERTENSION: ICD-10-CM

## 2025-05-19 DIAGNOSIS — E78.5 HYPERLIPIDEMIA, UNSPECIFIED: ICD-10-CM

## 2025-05-19 PROCEDURE — 99214 OFFICE O/P EST MOD 30 MIN: CPT

## 2025-05-19 PROCEDURE — 93000 ELECTROCARDIOGRAM COMPLETE: CPT

## 2025-06-10 ENCOUNTER — OFFICE (OUTPATIENT)
Dept: URBAN - METROPOLITAN AREA CLINIC 28 | Facility: CLINIC | Age: 85
Setting detail: OPHTHALMOLOGY
End: 2025-06-10
Payer: MEDICARE

## 2025-06-10 DIAGNOSIS — H16.223: ICD-10-CM

## 2025-06-10 DIAGNOSIS — H02.131: ICD-10-CM

## 2025-06-10 DIAGNOSIS — H01.002: ICD-10-CM

## 2025-06-10 DIAGNOSIS — H01.004: ICD-10-CM

## 2025-06-10 DIAGNOSIS — H40.013: ICD-10-CM

## 2025-06-10 DIAGNOSIS — H47.233: ICD-10-CM

## 2025-06-10 DIAGNOSIS — H25.13: ICD-10-CM

## 2025-06-10 DIAGNOSIS — H02.403: ICD-10-CM

## 2025-06-10 DIAGNOSIS — H40.033: ICD-10-CM

## 2025-06-10 DIAGNOSIS — H40.063: ICD-10-CM

## 2025-06-10 DIAGNOSIS — H01.001: ICD-10-CM

## 2025-06-10 DIAGNOSIS — H01.005: ICD-10-CM

## 2025-06-10 PROCEDURE — 99213 OFFICE O/P EST LOW 20 MIN: CPT | Performed by: OPHTHALMOLOGY

## 2025-06-10 ASSESSMENT — REFRACTION_MANIFEST
OD_ADD: +2.50
OD_SPHERE: +2.50
OS_SPHERE: +1.75
OS_ADD: +2.50

## 2025-06-10 ASSESSMENT — REFRACTION_CURRENTRX
OS_AXIS: 072
OD_AXIS: 180
OS_CYLINDER: -0.50
OS_CYLINDER: -0.50
OD_SPHERE: +3.50
OD_CYLINDER: 0.00
OS_CYLINDER: 0.00
OS_AXIS: 180
OD_SPHERE: +5.00
OS_SPHERE: +3.50
OD_SPHERE: +1.50
OD_OVR_VA: 20/
OS_OVR_VA: 20/
OS_VPRISM_DIRECTION: SV
OD_OVR_VA: 20/
OS_VPRISM_DIRECTION: SV
OS_OVR_VA: 20/
OS_AXIS: 180
OD_AXIS: 147
OS_SPHERE: +1.25
OD_SPHERE: +2.50
OD_SPHERE: +5.00
OS_VPRISM_DIRECTION: SV
OD_AXIS: 180
OD_CYLINDER: 0.00
OS_SPHERE: +1.75
OD_AXIS: 180
OS_SPHERE: +4.50
OD_CYLINDER: 0.00
OD_AXIS: 180
OD_CYLINDER: 0.00
OS_SPHERE: +4.25
OD_VPRISM_DIRECTION: SV
OS_AXIS: 180
OS_AXIS: 180
OD_VPRISM_DIRECTION: SV
OD_CYLINDER: -0.50
OD_SPHERE: +1.00
OD_VPRISM_DIRECTION: SV
OS_SPHERE: +1.50
OS_OVR_VA: 20/
OS_CYLINDER: 0.00
OD_CYLINDER: 0.00
OS_CYLINDER: 0.00
OS_AXIS: 066
OD_VPRISM_DIRECTION: SV
OD_AXIS: 180
OS_CYLINDER: 0.00
OS_VPRISM_DIRECTION: SV
OD_OVR_VA: 20/

## 2025-06-10 ASSESSMENT — PACHYMETRY
OD_CT_UM: 576
OD_CT_CORRECTION: -2
OS_CT_CORRECTION: -3
OS_CT_UM: 583

## 2025-06-10 ASSESSMENT — REFRACTION_AUTOREFRACTION
OS_CYLINDER: -0.50
OS_AXIS: 068
OS_SPHERE: +2.00
OD_SPHERE: +2.50
OD_CYLINDER: 0.00
OD_AXIS: 180

## 2025-06-10 ASSESSMENT — LID EXAM ASSESSMENTS
OS_BLEPHARITIS: LLL LUL T
OS_COMMENTS: 1+ 2+ ECTROPIO
OD_COMMENTS: 1+ 2+ ECTROPIO
OD_BLEPHARITIS: RLL RUL T

## 2025-06-10 ASSESSMENT — KERATOMETRY
OS_K1POWER_DIOPTERS: 44.25
OD_K2POWER_DIOPTERS: 44.75
METHOD_AUTO_MANUAL: AUTO
OD_AXISANGLE_DEGREES: 098
OS_K2POWER_DIOPTERS: 44.25
OS_AXISANGLE_DEGREES: 090
OD_K1POWER_DIOPTERS: 44.00

## 2025-06-10 ASSESSMENT — TEAR BREAK UP TIME (TBUT)
OD_TBUT: 2+
OS_TBUT: 2+

## 2025-06-10 ASSESSMENT — TONOMETRY
OD_IOP_MMHG: 21
OS_IOP_MMHG: 21

## 2025-06-10 ASSESSMENT — VISUAL ACUITY
OD_BCVA: 20/40-1
OS_BCVA: 20/50-1

## 2025-06-10 ASSESSMENT — LID POSITION - PTOSIS
OD_PTOSIS: RUL 2+ 3+
OS_PTOSIS: LUL 1+ 2+

## 2025-07-08 NOTE — ED PROVIDER NOTE - PHYSICAL EXAMINATION
Pharmacy staes they are unable to get Propylene Glycol-Glycerin (ARTIFICIAL TEARS) 1-0.3 % SOLN in stock.    Requests an alternative prescription.   CONST: nontoxic NAD speaking in full sentences  HEAD: atraumatic  EYES: conjunctivae clear  NECK: supple  CARD: regular rate  CHEST: breathing comfortably, no stridor/retractions/tripoding, clear BS  EXT: FROM  SKIN: warm, dry  NEURO: awake alert answering questions oriented x3 following commands moving all extremities w/ equal strength, SILT x4

## 2025-07-14 ENCOUNTER — RX RENEWAL (OUTPATIENT)
Age: 85
End: 2025-07-14

## 2025-08-06 ENCOUNTER — TRANSCRIPTION ENCOUNTER (OUTPATIENT)
Age: 85
End: 2025-08-06

## 2025-08-14 ENCOUNTER — APPOINTMENT (OUTPATIENT)
Dept: HEART AND VASCULAR | Facility: CLINIC | Age: 85
End: 2025-08-14
Payer: MEDICARE

## 2025-08-14 VITALS
WEIGHT: 143 LBS | RESPIRATION RATE: 17 BRPM | HEART RATE: 79 BPM | DIASTOLIC BLOOD PRESSURE: 72 MMHG | SYSTOLIC BLOOD PRESSURE: 135 MMHG | HEIGHT: 64 IN | BODY MASS INDEX: 24.41 KG/M2 | OXYGEN SATURATION: 97 % | TEMPERATURE: 98 F

## 2025-08-14 DIAGNOSIS — I10 ESSENTIAL (PRIMARY) HYPERTENSION: ICD-10-CM

## 2025-08-14 DIAGNOSIS — E78.5 HYPERLIPIDEMIA, UNSPECIFIED: ICD-10-CM

## 2025-08-14 DIAGNOSIS — I25.10 ATHEROSCLEROTIC HEART DISEASE OF NATIVE CORONARY ARTERY W/OUT ANGINA PECTORIS: ICD-10-CM

## 2025-08-14 PROCEDURE — 99214 OFFICE O/P EST MOD 30 MIN: CPT

## 2025-08-14 PROCEDURE — 93000 ELECTROCARDIOGRAM COMPLETE: CPT

## 2025-08-28 ENCOUNTER — APPOINTMENT (OUTPATIENT)
Dept: HEART AND VASCULAR | Facility: CLINIC | Age: 85
End: 2025-08-28

## 2025-08-28 PROCEDURE — 93306 TTE W/DOPPLER COMPLETE: CPT

## 2025-09-03 ENCOUNTER — EMERGENCY (EMERGENCY)
Facility: HOSPITAL | Age: 85
LOS: 1 days | End: 2025-09-03
Attending: EMERGENCY MEDICINE | Admitting: EMERGENCY MEDICINE
Payer: MEDICARE

## 2025-09-03 VITALS
SYSTOLIC BLOOD PRESSURE: 186 MMHG | OXYGEN SATURATION: 95 % | DIASTOLIC BLOOD PRESSURE: 83 MMHG | HEART RATE: 80 BPM | TEMPERATURE: 98 F | RESPIRATION RATE: 18 BRPM

## 2025-09-03 VITALS — WEIGHT: 143.08 LBS

## 2025-09-03 DIAGNOSIS — R21 RASH AND OTHER NONSPECIFIC SKIN ERUPTION: ICD-10-CM

## 2025-09-03 DIAGNOSIS — R51.9 HEADACHE, UNSPECIFIED: ICD-10-CM

## 2025-09-03 DIAGNOSIS — R07.89 OTHER CHEST PAIN: ICD-10-CM

## 2025-09-03 DIAGNOSIS — Z91.018 ALLERGY TO OTHER FOODS: ICD-10-CM

## 2025-09-03 DIAGNOSIS — Z88.2 ALLERGY STATUS TO SULFONAMIDES: ICD-10-CM

## 2025-09-03 DIAGNOSIS — R11.0 NAUSEA: ICD-10-CM

## 2025-09-03 DIAGNOSIS — Z90.710 ACQUIRED ABSENCE OF BOTH CERVIX AND UTERUS: Chronic | ICD-10-CM

## 2025-09-03 DIAGNOSIS — Z88.0 ALLERGY STATUS TO PENICILLIN: ICD-10-CM

## 2025-09-03 DIAGNOSIS — Z88.1 ALLERGY STATUS TO OTHER ANTIBIOTIC AGENTS: ICD-10-CM

## 2025-09-03 DIAGNOSIS — R10.10 UPPER ABDOMINAL PAIN, UNSPECIFIED: ICD-10-CM

## 2025-09-03 DIAGNOSIS — Z88.8 ALLERGY STATUS TO OTHER DRUGS, MEDICAMENTS AND BIOLOGICAL SUBSTANCES: ICD-10-CM

## 2025-09-03 LAB
ALBUMIN SERPL ELPH-MCNC: 3.6 G/DL — SIGNIFICANT CHANGE UP (ref 3.3–5)
ALP SERPL-CCNC: 83 U/L — SIGNIFICANT CHANGE UP (ref 40–120)
ALT FLD-CCNC: 8 U/L — LOW (ref 10–45)
ANION GAP SERPL CALC-SCNC: 10 MMOL/L — SIGNIFICANT CHANGE UP (ref 5–17)
AST SERPL-CCNC: 17 U/L — SIGNIFICANT CHANGE UP (ref 10–40)
BASOPHILS # BLD AUTO: 0.03 K/UL — SIGNIFICANT CHANGE UP (ref 0–0.2)
BASOPHILS NFR BLD AUTO: 0.6 % — SIGNIFICANT CHANGE UP (ref 0–2)
BILIRUB SERPL-MCNC: 0.2 MG/DL — SIGNIFICANT CHANGE UP (ref 0.2–1.2)
BUN SERPL-MCNC: 30 MG/DL — HIGH (ref 7–23)
CALCIUM SERPL-MCNC: 11.1 MG/DL — HIGH (ref 8.4–10.5)
CHLORIDE SERPL-SCNC: 106 MMOL/L — SIGNIFICANT CHANGE UP (ref 96–108)
CO2 SERPL-SCNC: 23 MMOL/L — SIGNIFICANT CHANGE UP (ref 22–31)
CREAT SERPL-MCNC: 1.48 MG/DL — HIGH (ref 0.5–1.3)
EGFR: 35 ML/MIN/1.73M2 — LOW
EGFR: 35 ML/MIN/1.73M2 — LOW
EOSINOPHIL # BLD AUTO: 0.25 K/UL — SIGNIFICANT CHANGE UP (ref 0–0.5)
EOSINOPHIL NFR BLD AUTO: 4.7 % — SIGNIFICANT CHANGE UP (ref 0–6)
GLUCOSE SERPL-MCNC: 103 MG/DL — HIGH (ref 70–99)
HCT VFR BLD CALC: 31.7 % — LOW (ref 34.5–45)
HGB BLD-MCNC: 10 G/DL — LOW (ref 11.5–15.5)
IMM GRANULOCYTES # BLD AUTO: 0.02 K/UL — SIGNIFICANT CHANGE UP (ref 0–0.07)
IMM GRANULOCYTES NFR BLD AUTO: 0.4 % — SIGNIFICANT CHANGE UP (ref 0–0.9)
LIDOCAIN IGE QN: 16 U/L — SIGNIFICANT CHANGE UP (ref 7–60)
LYMPHOCYTES # BLD AUTO: 1.4 K/UL — SIGNIFICANT CHANGE UP (ref 1–3.3)
LYMPHOCYTES NFR BLD AUTO: 26.4 % — SIGNIFICANT CHANGE UP (ref 13–44)
MAGNESIUM SERPL-MCNC: 2 MG/DL — SIGNIFICANT CHANGE UP (ref 1.6–2.6)
MCHC RBC-ENTMCNC: 30.1 PG — SIGNIFICANT CHANGE UP (ref 27–34)
MCHC RBC-ENTMCNC: 31.5 G/DL — LOW (ref 32–36)
MCV RBC AUTO: 95.5 FL — SIGNIFICANT CHANGE UP (ref 80–100)
MONOCYTES # BLD AUTO: 0.93 K/UL — HIGH (ref 0–0.9)
MONOCYTES NFR BLD AUTO: 17.5 % — HIGH (ref 2–14)
NEUTROPHILS # BLD AUTO: 2.67 K/UL — SIGNIFICANT CHANGE UP (ref 1.8–7.4)
NEUTROPHILS NFR BLD AUTO: 50.4 % — SIGNIFICANT CHANGE UP (ref 43–77)
NRBC # BLD AUTO: 0 K/UL — SIGNIFICANT CHANGE UP (ref 0–0)
NRBC # FLD: 0 K/UL — SIGNIFICANT CHANGE UP (ref 0–0)
NRBC BLD AUTO-RTO: 0 /100 WBCS — SIGNIFICANT CHANGE UP (ref 0–0)
PLATELET # BLD AUTO: 263 K/UL — SIGNIFICANT CHANGE UP (ref 150–400)
PMV BLD: 9.7 FL — SIGNIFICANT CHANGE UP (ref 7–13)
POTASSIUM SERPL-MCNC: 4.2 MMOL/L — SIGNIFICANT CHANGE UP (ref 3.5–5.3)
POTASSIUM SERPL-SCNC: 4.2 MMOL/L — SIGNIFICANT CHANGE UP (ref 3.5–5.3)
PROT SERPL-MCNC: 6.2 G/DL — SIGNIFICANT CHANGE UP (ref 6–8.3)
RBC # BLD: 3.32 M/UL — LOW (ref 3.8–5.2)
RBC # FLD: 15.4 % — HIGH (ref 10.3–14.5)
SODIUM SERPL-SCNC: 139 MMOL/L — SIGNIFICANT CHANGE UP (ref 135–145)
TROPONIN T, HIGH SENSITIVITY RESULT: 20 NG/L — HIGH
TROPONIN T, HIGH SENSITIVITY RESULT: 23 NG/L — HIGH
WBC # BLD: 5.3 K/UL — SIGNIFICANT CHANGE UP (ref 3.8–10.5)
WBC # FLD AUTO: 5.3 K/UL — SIGNIFICANT CHANGE UP (ref 3.8–10.5)

## 2025-09-03 PROCEDURE — 84484 ASSAY OF TROPONIN QUANT: CPT

## 2025-09-03 PROCEDURE — 99285 EMERGENCY DEPT VISIT HI MDM: CPT | Mod: FS

## 2025-09-03 PROCEDURE — 83690 ASSAY OF LIPASE: CPT

## 2025-09-03 PROCEDURE — 36415 COLL VENOUS BLD VENIPUNCTURE: CPT

## 2025-09-03 PROCEDURE — 85025 COMPLETE CBC W/AUTO DIFF WBC: CPT

## 2025-09-03 PROCEDURE — 93010 ELECTROCARDIOGRAM REPORT: CPT

## 2025-09-03 PROCEDURE — 80053 COMPREHEN METABOLIC PANEL: CPT

## 2025-09-03 PROCEDURE — 93005 ELECTROCARDIOGRAM TRACING: CPT

## 2025-09-03 PROCEDURE — 96374 THER/PROPH/DIAG INJ IV PUSH: CPT

## 2025-09-03 PROCEDURE — 83735 ASSAY OF MAGNESIUM: CPT

## 2025-09-03 PROCEDURE — 99284 EMERGENCY DEPT VISIT MOD MDM: CPT | Mod: 25

## 2025-09-03 RX ORDER — ACETAMINOPHEN 500 MG/5ML
1000 LIQUID (ML) ORAL ONCE
Refills: 0 | Status: COMPLETED | OUTPATIENT
Start: 2025-09-03 | End: 2025-09-03

## 2025-09-03 RX ADMIN — Medication 1000 MILLIGRAM(S): at 05:34

## 2025-09-03 RX ADMIN — Medication 400 MILLIGRAM(S): at 03:26

## 2025-09-08 ENCOUNTER — EMERGENCY (EMERGENCY)
Facility: HOSPITAL | Age: 85
LOS: 1 days | End: 2025-09-08
Attending: STUDENT IN AN ORGANIZED HEALTH CARE EDUCATION/TRAINING PROGRAM | Admitting: STUDENT IN AN ORGANIZED HEALTH CARE EDUCATION/TRAINING PROGRAM
Payer: MEDICARE

## 2025-09-08 VITALS
OXYGEN SATURATION: 97 % | DIASTOLIC BLOOD PRESSURE: 81 MMHG | RESPIRATION RATE: 16 BRPM | WEIGHT: 145.06 LBS | SYSTOLIC BLOOD PRESSURE: 174 MMHG | HEIGHT: 64 IN | HEART RATE: 94 BPM | TEMPERATURE: 98 F

## 2025-09-08 DIAGNOSIS — Z90.710 ACQUIRED ABSENCE OF BOTH CERVIX AND UTERUS: Chronic | ICD-10-CM

## 2025-09-08 LAB
ALBUMIN SERPL ELPH-MCNC: 4.3 G/DL — SIGNIFICANT CHANGE UP (ref 3.3–5)
ALP SERPL-CCNC: 85 U/L — SIGNIFICANT CHANGE UP (ref 40–120)
ALT FLD-CCNC: 9 U/L — LOW (ref 10–45)
ANION GAP SERPL CALC-SCNC: 15 MMOL/L — SIGNIFICANT CHANGE UP (ref 5–17)
APTT BLD: 28.2 SEC — SIGNIFICANT CHANGE UP (ref 26.1–36.8)
AST SERPL-CCNC: 20 U/L — SIGNIFICANT CHANGE UP (ref 10–40)
BASOPHILS # BLD AUTO: 0.03 K/UL — SIGNIFICANT CHANGE UP (ref 0–0.2)
BASOPHILS NFR BLD AUTO: 0.3 % — SIGNIFICANT CHANGE UP (ref 0–2)
BILIRUB SERPL-MCNC: 0.4 MG/DL — SIGNIFICANT CHANGE UP (ref 0.2–1.2)
BUN SERPL-MCNC: 29 MG/DL — HIGH (ref 7–23)
CALCIUM SERPL-MCNC: 11.3 MG/DL — HIGH (ref 8.4–10.5)
CHLORIDE SERPL-SCNC: 103 MMOL/L — SIGNIFICANT CHANGE UP (ref 96–108)
CO2 SERPL-SCNC: 21 MMOL/L — LOW (ref 22–31)
CREAT SERPL-MCNC: 1.53 MG/DL — HIGH (ref 0.5–1.3)
CRP SERPL-MCNC: 25.8 MG/L — HIGH (ref 0–4)
EGFR: 34 ML/MIN/1.73M2 — LOW
EGFR: 34 ML/MIN/1.73M2 — LOW
EOSINOPHIL # BLD AUTO: 0.09 K/UL — SIGNIFICANT CHANGE UP (ref 0–0.5)
EOSINOPHIL NFR BLD AUTO: 0.9 % — SIGNIFICANT CHANGE UP (ref 0–6)
ERYTHROCYTE [SEDIMENTATION RATE] IN BLOOD: 28 MM/HR — HIGH (ref 0–20)
GLUCOSE SERPL-MCNC: 143 MG/DL — HIGH (ref 70–99)
HCT VFR BLD CALC: 34.9 % — SIGNIFICANT CHANGE UP (ref 34.5–45)
HGB BLD-MCNC: 11.2 G/DL — LOW (ref 11.5–15.5)
IMM GRANULOCYTES # BLD AUTO: 0.04 K/UL — SIGNIFICANT CHANGE UP (ref 0–0.07)
IMM GRANULOCYTES NFR BLD AUTO: 0.4 % — SIGNIFICANT CHANGE UP (ref 0–0.9)
INR BLD: 0.95 — SIGNIFICANT CHANGE UP (ref 0.85–1.16)
LYMPHOCYTES # BLD AUTO: 1.47 K/UL — SIGNIFICANT CHANGE UP (ref 1–3.3)
LYMPHOCYTES NFR BLD AUTO: 14.1 % — SIGNIFICANT CHANGE UP (ref 13–44)
MCHC RBC-ENTMCNC: 30.1 PG — SIGNIFICANT CHANGE UP (ref 27–34)
MCHC RBC-ENTMCNC: 32.1 G/DL — SIGNIFICANT CHANGE UP (ref 32–36)
MCV RBC AUTO: 93.8 FL — SIGNIFICANT CHANGE UP (ref 80–100)
MONOCYTES # BLD AUTO: 1.42 K/UL — HIGH (ref 0–0.9)
MONOCYTES NFR BLD AUTO: 13.6 % — SIGNIFICANT CHANGE UP (ref 2–14)
NEUTROPHILS # BLD AUTO: 7.41 K/UL — HIGH (ref 1.8–7.4)
NEUTROPHILS NFR BLD AUTO: 70.7 % — SIGNIFICANT CHANGE UP (ref 43–77)
NRBC # BLD AUTO: 0 K/UL — SIGNIFICANT CHANGE UP (ref 0–0)
NRBC # FLD: 0 K/UL — SIGNIFICANT CHANGE UP (ref 0–0)
NRBC BLD AUTO-RTO: 0 /100 WBCS — SIGNIFICANT CHANGE UP (ref 0–0)
PLATELET # BLD AUTO: 300 K/UL — SIGNIFICANT CHANGE UP (ref 150–400)
PMV BLD: 10.2 FL — SIGNIFICANT CHANGE UP (ref 7–13)
POTASSIUM SERPL-MCNC: 4.2 MMOL/L — SIGNIFICANT CHANGE UP (ref 3.5–5.3)
POTASSIUM SERPL-SCNC: 4.2 MMOL/L — SIGNIFICANT CHANGE UP (ref 3.5–5.3)
PROT SERPL-MCNC: 7.4 G/DL — SIGNIFICANT CHANGE UP (ref 6–8.3)
PROTHROM AB SERPL-ACNC: 11 SEC — SIGNIFICANT CHANGE UP (ref 9.9–13.4)
RBC # BLD: 3.72 M/UL — LOW (ref 3.8–5.2)
RBC # FLD: 15.1 % — HIGH (ref 10.3–14.5)
SODIUM SERPL-SCNC: 139 MMOL/L — SIGNIFICANT CHANGE UP (ref 135–145)
WBC # BLD: 10.46 K/UL — SIGNIFICANT CHANGE UP (ref 3.8–10.5)
WBC # FLD AUTO: 10.46 K/UL — SIGNIFICANT CHANGE UP (ref 3.8–10.5)

## 2025-09-08 PROCEDURE — 20605 DRAIN/INJ JOINT/BURSA W/O US: CPT | Mod: RT

## 2025-09-08 PROCEDURE — 99284 EMERGENCY DEPT VISIT MOD MDM: CPT | Mod: 25

## 2025-09-08 PROCEDURE — 87205 SMEAR GRAM STAIN: CPT

## 2025-09-08 PROCEDURE — 82945 GLUCOSE OTHER FLUID: CPT

## 2025-09-08 PROCEDURE — 85652 RBC SED RATE AUTOMATED: CPT

## 2025-09-08 PROCEDURE — 85025 COMPLETE CBC W/AUTO DIFF WBC: CPT

## 2025-09-08 PROCEDURE — 85610 PROTHROMBIN TIME: CPT

## 2025-09-08 PROCEDURE — 36415 COLL VENOUS BLD VENIPUNCTURE: CPT

## 2025-09-08 PROCEDURE — 89051 BODY FLUID CELL COUNT: CPT

## 2025-09-08 PROCEDURE — 80053 COMPREHEN METABOLIC PANEL: CPT

## 2025-09-08 PROCEDURE — 73080 X-RAY EXAM OF ELBOW: CPT | Mod: 26,RT

## 2025-09-08 PROCEDURE — 87999 UNLISTED MICROBIOLOGY PX: CPT

## 2025-09-08 PROCEDURE — 89060 EXAM SYNOVIAL FLUID CRYSTALS: CPT

## 2025-09-08 PROCEDURE — 99285 EMERGENCY DEPT VISIT HI MDM: CPT

## 2025-09-08 PROCEDURE — 87070 CULTURE OTHR SPECIMN AEROBIC: CPT

## 2025-09-08 PROCEDURE — 85730 THROMBOPLASTIN TIME PARTIAL: CPT

## 2025-09-08 PROCEDURE — 73080 X-RAY EXAM OF ELBOW: CPT

## 2025-09-08 PROCEDURE — 86140 C-REACTIVE PROTEIN: CPT

## 2025-09-08 RX ORDER — ACETAMINOPHEN 500 MG/5ML
650 LIQUID (ML) ORAL ONCE
Refills: 0 | Status: COMPLETED | OUTPATIENT
Start: 2025-09-08 | End: 2025-09-08

## 2025-09-08 RX ADMIN — Medication 650 MILLIGRAM(S): at 22:25

## 2025-09-10 ENCOUNTER — TRANSCRIPTION ENCOUNTER (OUTPATIENT)
Age: 85
End: 2025-09-10

## 2025-09-10 ENCOUNTER — APPOINTMENT (OUTPATIENT)
Dept: INTERNAL MEDICINE | Facility: CLINIC | Age: 85
End: 2025-09-10

## 2025-09-10 VITALS
HEIGHT: 64 IN | HEART RATE: 88 BPM | OXYGEN SATURATION: 98 % | DIASTOLIC BLOOD PRESSURE: 57 MMHG | WEIGHT: 145.13 LBS | BODY MASS INDEX: 24.78 KG/M2 | SYSTOLIC BLOOD PRESSURE: 115 MMHG | TEMPERATURE: 97.6 F

## 2025-09-10 DIAGNOSIS — D64.9 ANEMIA, UNSPECIFIED: ICD-10-CM

## 2025-09-10 RX ORDER — COVID-19 VACCINE, MRNA 50 UG/.5ML
50 INJECTION, SUSPENSION INTRAMUSCULAR
Qty: 1 | Refills: 0 | Status: ACTIVE | OUTPATIENT
Start: 2025-09-10

## 2025-09-10 RX ORDER — PNEUMOCOCCAL 20-VALENT CONJUGATE VACCINE 2.2; 2.2; 2.2; 2.2; 2.2; 2.2; 2.2; 2.2; 2.2; 2.2; 2.2; 2.2; 2.2; 2.2; 2.2; 2.2; 4.4; 2.2; 2.2; 2.2 UG/.5ML; UG/.5ML; UG/.5ML; UG/.5ML; UG/.5ML; UG/.5ML; UG/.5ML; UG/.5ML; UG/.5ML; UG/.5ML; UG/.5ML; UG/.5ML; UG/.5ML; UG/.5ML; UG/.5ML; UG/.5ML; UG/.5ML; UG/.5ML; UG/.5ML; UG/.5ML
INJECTION, SUSPENSION INTRAMUSCULAR
Qty: 1 | Refills: 0 | Status: ACTIVE | OUTPATIENT
Start: 2025-09-10

## 2025-09-10 RX ORDER — TURMERIC 400 MG
400 CAPSULE ORAL
Refills: 0 | Status: ACTIVE | COMMUNITY
Start: 2025-09-10

## 2025-09-11 DIAGNOSIS — Z91.018 ALLERGY TO OTHER FOODS: ICD-10-CM

## 2025-09-11 DIAGNOSIS — Z88.1 ALLERGY STATUS TO OTHER ANTIBIOTIC AGENTS: ICD-10-CM

## 2025-09-11 DIAGNOSIS — M25.521 PAIN IN RIGHT ELBOW: ICD-10-CM

## 2025-09-11 DIAGNOSIS — M25.421 EFFUSION, RIGHT ELBOW: ICD-10-CM

## 2025-09-11 DIAGNOSIS — R68.83 CHILLS (WITHOUT FEVER): ICD-10-CM

## 2025-09-11 DIAGNOSIS — Z88.0 ALLERGY STATUS TO PENICILLIN: ICD-10-CM

## 2025-09-11 DIAGNOSIS — Z88.8 ALLERGY STATUS TO OTHER DRUGS, MEDICAMENTS AND BIOLOGICAL SUBSTANCES: ICD-10-CM

## 2025-09-11 DIAGNOSIS — Z88.2 ALLERGY STATUS TO SULFONAMIDES: ICD-10-CM

## 2025-09-12 ENCOUNTER — TRANSCRIPTION ENCOUNTER (OUTPATIENT)
Age: 85
End: 2025-09-12

## 2025-09-16 ENCOUNTER — TRANSCRIPTION ENCOUNTER (OUTPATIENT)
Age: 85
End: 2025-09-16